# Patient Record
Sex: FEMALE | Race: WHITE | NOT HISPANIC OR LATINO | Employment: FULL TIME | URBAN - METROPOLITAN AREA
[De-identification: names, ages, dates, MRNs, and addresses within clinical notes are randomized per-mention and may not be internally consistent; named-entity substitution may affect disease eponyms.]

---

## 2017-09-25 NOTE — PROGRESS NOTES
"  SUBJECTIVE:   Deni Simental is a 36 year old female who presents to clinic today for the following health issues:        ENT Symptoms             Symptoms: cc Present Absent Comment   Fever/Chills  x  Chills/sweats   Fatigue  x     Muscle Aches  x     Eye Irritation   x    Sneezing   x    Nasal Eduardo/Drg  x     Sinus Pressure/Pain   x    Loss of smell   x    Dental pain   x    Sore Throat  x     Swollen Glands  x     Ear Pain/Fullness  x     Cough  x  Non-productive   Wheeze   x    Chest Pain   x    Shortness of breath   x    Rash   x    Other   x      Symptom duration:  2 weeks    Symptom severity:  mod    Treatments tried:  OTC    Contacts:  none     Having more headaches, due to increase in triggers.  Starts as frontal pain then spreads to neck and jaw, then gets photo and phonophobia with nausea and vomiting.  Tries OTC analgesics without help.  Gets weekly HA for past 8-9 months, has near daily tension ha.    Medications updated and reviewed.  Past, family and surgical history is updated and reviewed in the record.  ROS:  Other than noted above, general, HEENT, respiratory, cardiac and gastrointestinal systems are negative.  OBJECTIVE:  /67  Pulse 89  Ht 5' 5.5\" (1.664 m)  Wt 184 lb (83.5 kg)  SpO2 100%  BMI 30.15 kg/m2   GENERAL: Pleasant and interactive. No acute distress.  HEENT: Normocephalic, atraumatic. PEERRLA, EOMI.  Scleras, lids and conjunctivae normal. Pinnas, canals and TM's clear.  Nose with thick discharge. Oropharynx moist and clear. Sinus tenderness to palpation present.   NECK: supple and free of adenopathy or masses, the thyroid is normal without enlargement or nodules.  CHEST:  clear, no wheezing or rales. Normal symmetric air entry throughout both lung fields. No chest wall deformities or tenderness.  HEART:  S1 and S2 normal, no murmurs, clicks, gallops or rubs. Regular rate and rhythm.  SKIN:  Only benign skin findings. No unusual rashes or suspicious skin lesions noted. " Nails appear normal.  NEURO: CN 2-12 intact    Assessment:  (J01.90) Acute sinusitis with symptoms > 10 days  (primary encounter diagnosis)  Comment: prolonged symptoms  Plan: amoxicillin-clavulanate (AUGMENTIN) 875-125 MG         per tablet        Treat with augmentin, add probiotic/yogurt    (G43.109) Migraine with aura and without status migrainosus, not intractable  Comment: worsening  Plan: rizatriptan (MAXALT) 5 MG tablet        Combined daily tension with episodic migraine  See chiropractor for adjustment, pt making changes to reduce stress which is a trigger  Trial of maxalt for HA lysis  F/u 4-6 weeks.    See Patient Instructions

## 2017-09-26 ENCOUNTER — OFFICE VISIT (OUTPATIENT)
Dept: FAMILY MEDICINE | Facility: CLINIC | Age: 37
End: 2017-09-26
Payer: COMMERCIAL

## 2017-09-26 VITALS
DIASTOLIC BLOOD PRESSURE: 67 MMHG | HEART RATE: 89 BPM | HEIGHT: 66 IN | SYSTOLIC BLOOD PRESSURE: 111 MMHG | WEIGHT: 184 LBS | BODY MASS INDEX: 29.57 KG/M2 | OXYGEN SATURATION: 100 %

## 2017-09-26 DIAGNOSIS — J01.90 ACUTE SINUSITIS WITH SYMPTOMS > 10 DAYS: Primary | ICD-10-CM

## 2017-09-26 DIAGNOSIS — G43.109 MIGRAINE WITH AURA AND WITHOUT STATUS MIGRAINOSUS, NOT INTRACTABLE: ICD-10-CM

## 2017-09-26 PROCEDURE — 99214 OFFICE O/P EST MOD 30 MIN: CPT | Performed by: FAMILY MEDICINE

## 2017-09-26 RX ORDER — RIZATRIPTAN BENZOATE 5 MG/1
5-10 TABLET ORAL
Qty: 18 TABLET | Refills: 1 | Status: SHIPPED | OUTPATIENT
Start: 2017-09-26 | End: 2018-02-15

## 2017-09-26 RX ORDER — THYROID 15 MG/1
15 TABLET ORAL DAILY
COMMUNITY
End: 2018-08-20 | Stop reason: DRUGHIGH

## 2017-09-26 NOTE — MR AVS SNAPSHOT
After Visit Summary   9/26/2017    Deni Simental    MRN: [id number]           Patient Information     Date Of Birth          1980        Visit Information        Provider Department      9/26/2017 11:55 AM Radha Lay MD [facility]        Today's Diagnoses     Acute sinusitis with symptoms > 10 days    -  1    Migraine with aura and without status migrainosus, not intractable          Care Instructions        Follow up in 4-6 weeks for migraine headaches.      See a chiropractor for adjustment for headaches.          Follow-ups after your visit        Who to contact     If you have questions or need follow up information about today's clinic visit or your schedule please contact [facility] directly at [phone].  Normal or non-critical lab and imaging results will be communicated to you by Dalloul[employer]Whart, letter or phone within 4 business days after the clinic has received the results. If you do not hear from us within 7 days, please contact the clinic through Dalloul[employer]Whart or phone. If you have a critical or abnormal lab result, we will notify you by phone as soon as possible.  Submit refill requests through [employer] or call your pharmacy and they will forward the refill request to us. Please allow 3 business days for your refill to be completed.          Additional Information About Your Visit        MyChart Information     [employer] gives you secure access to your electronic health record. If you see a primary care provider, you can also send messages to your care team and make appointments. If you have questions, please call your primary care clinic.  If you do not have a primary care provider, please call [phone] and they will assist you.        Care EveryWhere ID     This is your Care EveryWhere ID. This could be used by other organizations to access your [facility] medical records  [id number]        Your Vitals Were     Pulse Height Pulse Oximetry BMI  "(Body Mass Index)          89 5' 5.5\" (1.664 m) 100% 30.15 kg/m2         Blood Pressure from Last 3 Encounters:   09/26/17 111/67   09/14/16 104/67   09/28/15 109/66    Weight from Last 3 Encounters:   09/26/17 184 lb (83.5 kg)   09/14/16 165 lb 9.6 oz (75.1 kg)   09/28/15 173 lb 6.4 oz (78.7 kg)              Today, you had the following     No orders found for display         Today's Medication Changes          These changes are accurate as of: 9/26/17 12:40 PM.  If you have any questions, ask your nurse or doctor.               Start taking these medicines.        Dose/Directions    amoxicillin-clavulanate 875-125 MG per tablet   Commonly known as:  AUGMENTIN   Used for:  Acute sinusitis with symptoms > 10 days   Started by:  Radha Lay MD        Dose:  1 tablet   Take 1 tablet by mouth 2 times daily   Quantity:  20 tablet   Refills:  0       rizatriptan 5 MG tablet   Commonly known as:  MAXALT   Used for:  Migraine with aura and without status migrainosus, not intractable   Started by:  Radha Lay MD        Dose:  5-10 mg   Take 1-2 tablets (5-10 mg) by mouth at onset of headache for migraine May repeat in 2 hours. Max 6 tablets/24 hours.   Quantity:  18 tablet   Refills:  1            Where to get your medicines      These medications were sent to [place] [address] [place], [place] - [address] [facility]  [address] [facility] [address]     Phone:  [phone]     amoxicillin-clavulanate 875-125 MG per tablet    rizatriptan 5 MG tablet                Primary Care Provider Office Phone # Fax #    Kristen M Kehr, PA-C [phone]       [address] [facility] [address]        Equal Access to Services     ELEAZAR REEVES AH: Roya lauren Soaram, waaxda luqadaha, qaybta kaalmada adeegyaolga, robert valadez [facility] [phone].    ATENCIÓN: Si habla español, tiene a singleton disposición servicios gratuitos de asistencia lingüística. " Soniya bowling [phone].    We comply with applicable federal civil rights laws and [place] laws. We do not discriminate on the basis of race, color, national origin, age, disability sex, sexual orientation or gender identity.            Thank you!     [facility] you for [facility]  [facility]. Our goal is always to provide you with excellent care. Hearing back from our patients is one way we can continue to improve our services. Please take a few minutes to complete the written survey that you may receive in the mail after your visit with us. Thank you!             Your Updated Medication List - Protect others around you: Learn how to safely use, store and throw away your medicines at www.disposemymeds.org.          This list is accurate as of: 9/26/17 12:40 PM.  Always use your most recent med list.                   Brand Name Dispense Instructions for use Diagnosis    amoxicillin-clavulanate 875-125 MG per tablet    AUGMENTIN    20 tablet    Take 1 tablet by mouth 2 times daily    Acute sinusitis with symptoms > 10 days       rizatriptan 5 MG tablet    MAXALT    18 tablet    Take 1-2 tablets (5-10 mg) by mouth at onset of headache for migraine May repeat in 2 hours. Max 6 tablets/24 hours.    Migraine with aura and without status migrainosus, not intractable       thyroid 15 MG Tabs tablet      Take 15 mg by mouth daily

## 2017-09-26 NOTE — PATIENT INSTRUCTIONS
Follow up in 4-6 weeks for migraine headaches.      See a chiropractor for adjustment for headaches.

## 2017-11-22 ENCOUNTER — OFFICE VISIT (OUTPATIENT)
Dept: FAMILY MEDICINE | Facility: CLINIC | Age: 37
End: 2017-11-22
Payer: COMMERCIAL

## 2017-11-22 VITALS
WEIGHT: 182 LBS | DIASTOLIC BLOOD PRESSURE: 67 MMHG | SYSTOLIC BLOOD PRESSURE: 106 MMHG | HEART RATE: 60 BPM | BODY MASS INDEX: 29.83 KG/M2 | OXYGEN SATURATION: 100 % | TEMPERATURE: 99.4 F

## 2017-11-22 DIAGNOSIS — E06.3 HASHIMOTO'S THYROIDITIS: ICD-10-CM

## 2017-11-22 DIAGNOSIS — G43.109 MIGRAINE WITH AURA AND WITHOUT STATUS MIGRAINOSUS, NOT INTRACTABLE: Primary | ICD-10-CM

## 2017-11-22 PROCEDURE — 99214 OFFICE O/P EST MOD 30 MIN: CPT | Performed by: FAMILY MEDICINE

## 2017-11-22 RX ORDER — THYROID 30 MG/1
30 TABLET ORAL DAILY
Qty: 90 TABLET | Refills: 1 | Status: SHIPPED | OUTPATIENT
Start: 2017-11-22 | End: 2018-05-23

## 2017-11-22 NOTE — NURSING NOTE
"Chief Complaint   Patient presents with     Migraine Mgmt       Initial /67 (Cuff Size: Adult Regular)  Pulse 60  Temp 99.4  F (37.4  C) (Oral)  Wt 182 lb (82.6 kg)  SpO2 100%  BMI 29.83 kg/m2 Estimated body mass index is 29.83 kg/(m^2) as calculated from the following:    Height as of 9/26/17: 5' 5.5\" (1.664 m).    Weight as of this encounter: 182 lb (82.6 kg).  Medication Reconciliation: complete    Latesha Pacheco LPN    "

## 2017-11-22 NOTE — MR AVS SNAPSHOT
After Visit Summary   11/22/2017    Deni Simental    MRN: [id number]           Patient Information     Date Of Birth          1980        Visit Information        Provider Department      11/22/2017 9:55 AM Radha Lay MD [facility]        Today's Diagnoses     Migraine with aura and without status migrainosus, not intractable    -  1    Hashimoto's thyroiditis           Follow-ups after your visit        Follow-up notes from your care team     Return in about 1 week (around 11/29/2017) for Lab Work for thyroid.      Future tests that were ordered for you today     Open Future Orders        Priority Expected Expires Ordered    TSH with free T4 reflex Routine 1/17/2018 1/21/2018 11/22/2017            Who to contact     If you have questions or need follow up information about today's clinic visit or your schedule please contact [facility] directly at [phone].  Normal or non-critical lab and imaging results will be communicated to you by MyChart, letter or phone within 4 business days after the clinic has received the results. If you do not hear from us within 7 days, please contact the clinic through [employer] or phone. If you have a critical or abnormal lab result, we will notify you by phone as soon as possible.  Submit refill requests through [employer] or call your pharmacy and they will forward the refill request to us. Please allow 3 business days for your refill to be completed.          Additional Information About Your Visit        MyChart Information     [employer] gives you secure access to your electronic health record. If you see a primary care provider, you can also send messages to your care team and make appointments. If you have questions, please call your primary care clinic.  If you do not have a primary care provider, please call [phone] and they will assist you.        Care EveryWhere ID     This is your Care EveryWhere ID. This  could be used by other organizations to access your [place] medical records  [id number]        Your Vitals Were     Pulse Temperature Pulse Oximetry BMI (Body Mass Index)          60 99.4  F (37.4  C) (Oral) 100% 29.83 kg/m2         Blood Pressure from Last 3 Encounters:   11/22/17 106/67   09/26/17 111/67   09/14/16 104/67    Weight from Last 3 Encounters:   11/22/17 182 lb (82.6 kg)   09/26/17 184 lb (83.5 kg)   09/14/16 165 lb 9.6 oz (75.1 kg)                 Today's Medication Changes          These changes are accurate as of: 11/22/17 10:41 AM.  If you have any questions, ask your nurse or doctor.               These medicines have changed or have updated prescriptions.        Dose/Directions    * thyroid 15 MG Tabs tablet   This may have changed:  Another medication with the same name was added. Make sure you understand how and when to take each.   Changed by:  Radha Lay MD        Dose:  15 mg   Take 15 mg by mouth daily   Refills:  0       * thyroid 30 MG tablet   Commonly known as:  ARMOUR THYROID   This may have changed:  You were already taking a medication with the same name, and this prescription was added. Make sure you understand how and when to take each.   Used for:  Hashimoto's thyroiditis   Changed by:  Radha Lay MD        Dose:  30 mg   Take 1 tablet (30 mg) by mouth daily   Quantity:  90 tablet   Refills:  1       * Notice:  This list has 2 medication(s) that are the same as other medications prescribed for you. Read the directions carefully, and ask your doctor or other care provider to review them with you.         Where to get your medicines      These medications were sent to [place] [address] [place], [place] - [address] [facility] [address] [facility] [address]     Phone:  [phone]     thyroid 30 MG tablet                Primary Care Provider Office Phone # Fax #    Kristen M Kehr, PA-C [phone] [address] [facility]  [facility] [address]        Equal Access to Services     [facility] LALA : Hadii gris hyde hadjamesvalentin Lauren, waxavierda luqadaha, qaybta kaalmaolga heart, robert senior. So [facility] [phone].    ATENCIÓN: Si habla español, tiene a singleton disposición servicios gratuitos de asistencia lingüística. [facility] [phone].    We comply with applicable federal civil rights laws and [place] laws. We do not discriminate on the basis of race, color, national origin, age, disability, sex, sexual orientation, or gender identity.            Thank you!     Thank you for choosing [facility]  for your care. Our goal is always to provide you with excellent care. Hearing back from our patients is one way we can continue to improve our services. Please take a few minutes to complete the written survey that you may receive in the mail after your visit with us. Thank you!             Your Updated Medication List - Protect others around you: Learn how to safely use, store and throw away your medicines at www.disposemymeds.org.          This list is accurate as of: 11/22/17 10:41 AM.  Always use your most recent med list.                   Brand Name Dispense Instructions for use Diagnosis    rizatriptan 5 MG tablet    MAXALT    18 tablet    Take 1-2 tablets (5-10 mg) by mouth at onset of headache for migraine May repeat in 2 hours. Max 6 tablets/24 hours.    Migraine with aura and without status migrainosus, not intractable       * thyroid 15 MG Tabs tablet      Take 15 mg by mouth daily        * thyroid 30 MG tablet    ARMOUR THYROID    90 tablet    Take 1 tablet (30 mg) by mouth daily    Hashimoto's thyroiditis       * Notice:  This list has 2 medication(s) that are the same as other medications prescribed for you. Read the directions carefully, and ask your doctor or other care provider to review them with you.

## 2018-01-22 ENCOUNTER — MYC MEDICAL ADVICE (OUTPATIENT)
Dept: FAMILY MEDICINE | Facility: CLINIC | Age: 38
End: 2018-01-22

## 2018-01-22 DIAGNOSIS — E06.3 HASHIMOTO'S THYROIDITIS: ICD-10-CM

## 2018-01-22 LAB — TSH SERPL DL<=0.005 MIU/L-ACNC: 0.73 MU/L (ref 0.4–4)

## 2018-01-22 PROCEDURE — 84443 ASSAY THYROID STIM HORMONE: CPT | Performed by: FAMILY MEDICINE

## 2018-01-22 PROCEDURE — 36415 COLL VENOUS BLD VENIPUNCTURE: CPT | Performed by: FAMILY MEDICINE

## 2018-01-23 NOTE — TELEPHONE ENCOUNTER
Per protocol, will route encounter to be cosigned by provider for Verbal Orders.  Lidya Moralez RN

## 2018-02-12 ENCOUNTER — E-VISIT (OUTPATIENT)
Dept: FAMILY MEDICINE | Facility: CLINIC | Age: 38
End: 2018-02-12
Payer: COMMERCIAL

## 2018-02-12 DIAGNOSIS — G43.109 MIGRAINE WITH AURA AND WITHOUT STATUS MIGRAINOSUS, NOT INTRACTABLE: Primary | ICD-10-CM

## 2018-02-12 PROCEDURE — 99444 ZZC PHYSICIAN ONLINE EVALUATION & MANAGEMENT SERVICE: CPT | Performed by: FAMILY MEDICINE

## 2018-02-15 DIAGNOSIS — G43.109 MIGRAINE WITH AURA AND WITHOUT STATUS MIGRAINOSUS, NOT INTRACTABLE: ICD-10-CM

## 2018-02-15 RX ORDER — RIZATRIPTAN BENZOATE 5 MG/1
TABLET ORAL
Qty: 18 TABLET | Refills: 1 | Status: SHIPPED | OUTPATIENT
Start: 2018-02-15 | End: 2018-05-23

## 2018-05-23 DIAGNOSIS — E06.3 HASHIMOTO'S THYROIDITIS: ICD-10-CM

## 2018-05-23 DIAGNOSIS — G43.109 MIGRAINE WITH AURA AND WITHOUT STATUS MIGRAINOSUS, NOT INTRACTABLE: ICD-10-CM

## 2018-05-23 RX ORDER — LEVOTHYROXINE, LIOTHYRONINE 19; 4.5 UG/1; UG/1
TABLET ORAL
Qty: 90 TABLET | Refills: 1 | Status: SHIPPED | OUTPATIENT
Start: 2018-05-23 | End: 2018-11-19

## 2018-05-23 RX ORDER — RIZATRIPTAN BENZOATE 5 MG/1
TABLET ORAL
Qty: 18 TABLET | Refills: 3 | Status: SHIPPED | OUTPATIENT
Start: 2018-05-23 | End: 2019-05-06

## 2018-07-23 ENCOUNTER — RADIANT APPOINTMENT (OUTPATIENT)
Dept: ULTRASOUND IMAGING | Facility: CLINIC | Age: 38
End: 2018-07-23
Attending: FAMILY MEDICINE
Payer: COMMERCIAL

## 2018-07-23 DIAGNOSIS — E06.3 HASHIMOTO'S THYROIDITIS: ICD-10-CM

## 2018-07-23 PROCEDURE — 76536 US EXAM OF HEAD AND NECK: CPT

## 2018-07-25 ENCOUNTER — TELEPHONE (OUTPATIENT)
Dept: FAMILY MEDICINE | Facility: CLINIC | Age: 38
End: 2018-07-25

## 2018-08-13 NOTE — PROGRESS NOTES
SUBJECTIVE:   Deni Simental is a 37 year old female who presents to clinic today for the following health issues:    Hypothyroidism Follow-up      Since last visit, patient describes the following symptoms: constipation and fatigue, Fullness in throat, foggy mind       Amount of exercise or physical activity: some, not much     Problems taking medications regularly: No    Medication side effects: none    Diet: gluten-free/reduced, dairy free, no soy    Increased symptoms, would like to be checked.currently not taking armor thyroid    Feels her thyroid has increased in size, recent US shows new very small lesion on isthmus     Problem list and histories reviewed & adjusted, as indicated.  Additional history: as documented    Patient Active Problem List   Diagnosis     CARDIOVASCULAR SCREENING; LDL GOAL LESS THAN 160     Migraine with aura and without status migrainosus, not intractable     Hashimoto's thyroiditis     Past Surgical History:   Procedure Laterality Date     HC TOOTH EXTRACTION W/FORCEP         Social History   Substance Use Topics     Smoking status: Former Smoker     Types: Cigarettes     Quit date: 2011     Smokeless tobacco: Never Used      Comment: Lives in smoke free household     Alcohol use No     Family History   Problem Relation Age of Onset     Alcohol/Drug Mother       MVA alcohol related     Diabetes Father      hypoglycemia     Hypertension Father      Cancer Paternal Grandfather      lung cancer     Thyroid Disease Paternal Grandmother      Thyroid Disease Sister      Graves, tumor     C.A.D. No family hx of      Hypertension No family hx of      Depression No family hx of      GASTROINTESTINAL DISEASE No family hx of      HEART DISEASE No family hx of      Lipids No family hx of      Neurologic Disorder No family hx of      Thyroid Disease No family hx of      Respiratory No family hx of      Cerebrovascular Disease No family hx of      Glaucoma No family hx of       Macular Degeneration No family hx of          Current Outpatient Prescriptions   Medication Sig Dispense Refill     NP THYROID 30 MG tablet TAKE 1 TABLET (30 MG) BY MOUTH DAILY 90 tablet 1     rizatriptan (MAXALT) 5 MG tablet TAKE 1-2 TABLETS BY MOUTH AT ONSET OF HEADACHE FOR MIGRAINE. MAY REPEAT IN 2HRS. MAX 6 TAB/24 HRS 18 tablet 3     BP Readings from Last 3 Encounters:   08/20/18 118/71   11/22/17 106/67   09/26/17 111/67    Wt Readings from Last 3 Encounters:   08/20/18 170 lb (77.1 kg)   11/22/17 182 lb (82.6 kg)   09/26/17 184 lb (83.5 kg)                  Labs reviewed in EPIC    Reviewed and updated as needed this visit by clinical staff  Tobacco  Allergies  Meds  Problems  Med Hx  Surg Hx  Fam Hx  Soc Hx        Reviewed and updated as needed this visit by Provider  Allergies  Meds  Problems         ROS:  Constitutional, HEENT, cardiovascular, pulmonary, gi and gu systems are negative, except as otherwise noted.    OBJECTIVE:     /71  Pulse 71  Temp 97.8  F (36.6  C) (Oral)  Resp 14  Wt 170 lb (77.1 kg)  LMP 08/16/2018  BMI 27.86 kg/m2  Body mass index is 27.86 kg/(m^2).  GENERAL: healthy, alert and no distress  EYES: Eyes grossly normal to inspection, PERRL and conjunctivae and sclerae normal  NECK: no adenopathy, no asymmetry, masses, or scars and thyromegaly approximately 1.5 times normal  MS: no gross musculoskeletal defects noted, no edema  SKIN: no suspicious lesions or rashes  PSYCH: mentation appears normal, affect normal/bright    Diagnostic Test Results:  none     ASSESSMENT/PLAN:     (E06.3) Hashimoto's thyroiditis  (primary encounter diagnosis)  Comment: stable  Plan: TSH with free T4 reflex, T3, Free, US Thyroid        Check labs today, restart med if needed  Recheck US in 6 months due to new lesion,      Patient Instructions       Recheck ultrasound in 6 months      Book physical in 6 weeks.            Radha Lay MD  [facility]

## 2018-08-20 ENCOUNTER — OFFICE VISIT (OUTPATIENT)
Dept: FAMILY MEDICINE | Facility: CLINIC | Age: 38
End: 2018-08-20
Payer: COMMERCIAL

## 2018-08-20 VITALS
TEMPERATURE: 97.8 F | DIASTOLIC BLOOD PRESSURE: 71 MMHG | RESPIRATION RATE: 14 BRPM | SYSTOLIC BLOOD PRESSURE: 118 MMHG | WEIGHT: 170 LBS | HEART RATE: 71 BPM | BODY MASS INDEX: 27.86 KG/M2

## 2018-08-20 DIAGNOSIS — E06.3 HASHIMOTO'S THYROIDITIS: Primary | ICD-10-CM

## 2018-08-20 LAB
T3FREE SERPL-MCNC: 3 PG/ML (ref 2.3–4.2)
TSH SERPL DL<=0.005 MIU/L-ACNC: 1.1 MU/L (ref 0.4–4)

## 2018-08-20 PROCEDURE — 84443 ASSAY THYROID STIM HORMONE: CPT | Performed by: FAMILY MEDICINE

## 2018-08-20 PROCEDURE — 99213 OFFICE O/P EST LOW 20 MIN: CPT | Performed by: FAMILY MEDICINE

## 2018-08-20 PROCEDURE — 84481 FREE ASSAY (FT-3): CPT | Performed by: FAMILY MEDICINE

## 2018-08-20 PROCEDURE — 36415 COLL VENOUS BLD VENIPUNCTURE: CPT | Performed by: FAMILY MEDICINE

## 2018-08-20 NOTE — NURSING NOTE
"Chief Complaint   Patient presents with     Thyroid Disease       Initial /71  Pulse 71  Temp 97.8  F (36.6  C) (Oral)  Resp 14  Wt 170 lb (77.1 kg)  LMP 08/16/2018  BMI 27.86 kg/m2 Estimated body mass index is 27.86 kg/(m^2) as calculated from the following:    Height as of 9/26/17: 5' 5.5\" (1.664 m).    Weight as of this encounter: 170 lb (77.1 kg).  Medication Reconciliation: complete  Ike Forman CMA    "

## 2018-08-20 NOTE — MR AVS SNAPSHOT
After Visit Summary   8/20/2018    Deni Simental    MRN: [id number]           Patient Information     Date Of Birth          1980        Visit Information        Provider Department      8/20/2018 9:20 AM Radha Lay MD [facility]        Today's Diagnoses     Hashimoto's thyroiditis    -  1      Care Instructions        Recheck ultrasound in 6 months      Book physical in 6 weeks.                Follow-ups after your visit        Follow-up notes from your care team     Return in about 6 weeks (around 10/1/2018) for Physical Exam.      Future tests that were ordered for you today     Open Future Orders        Priority Expected Expires Ordered    US Thyroid Routine 2/20/2019 8/20/2019 8/20/2018            Who to contact     If you have questions or need follow up information about today's clinic visit or your schedule please contact [facility] directly at [phone].  Normal or non-critical lab and imaging results will be communicated to you by MyChart, letter or phone within 4 business days after the clinic has received the results. If you do not hear from us within 7 days, please contact the clinic through [employer] or phone. If you have a critical or abnormal lab result, we will notify you by phone as soon as possible.  Submit refill requests through [employer] or call your pharmacy and they will forward the refill request to us. Please allow 3 business days for your refill to be completed.          Additional Information About Your Visit        MyChart Information     [employer] gives you secure access to your electronic health record. If you see a primary care provider, you can also send messages to your care team and make appointments. If you have questions, please call your primary care clinic.  If you do not have a primary care provider, please call [phone] and they will assist you.        Care EveryWhere ID     This is your Care EveryWhere ID.  This could be used by other organizations to access your [facility] medical records  [id number]        Your Vitals Were     Pulse Temperature Respirations Last Period BMI (Body Mass Index)       71 97.8  F (36.6  C) (Oral) 14 08/16/2018 27.86 kg/m2        Blood Pressure from Last 3 Encounters:   08/20/18 118/71   11/22/17 106/67   09/26/17 111/67    Weight from Last 3 Encounters:   08/20/18 170 lb (77.1 kg)   11/22/17 182 lb (82.6 kg)   09/26/17 184 lb (83.5 kg)              We Performed the Following     T3, Free     TSH with free T4 reflex          Today's Medication Changes          These changes are accurate as of 8/20/18  9:25 AM.  If you have any questions, ask your nurse or doctor.               These medicines have changed or have updated prescriptions.        Dose/Directions    NP THYROID 30 MG tablet   This may have changed:  Another medication with the same name was removed. Continue taking this medication, and follow the directions you see here.   Used for:  Hashimoto's thyroiditis   Generic drug:  thyroid   Changed by:  Radha Lay MD        TAKE 1 TABLET (30 MG) BY MOUTH DAILY   Quantity:  90 tablet   Refills:  1                Primary Care Provider Office Phone # Fax #    Radha Lay [phone] [address] [facility] [address]        Equal Access to Services     [facility] LALA AH: Hadii gris hyde hadasho Soomaali, waaxda luqadaha, qaybta kaalmada jhonathanyada, robert senior. So [facility] [phone].    ATENCIÓN: Si habla español, tiene a singleton disposición servicios gratuitos de asistencia lingüística. Soniya al [phone].    We comply with applicable federal civil rights laws and [place] laws. We do not discriminate on the basis of race, color, national origin, age, disability, sex, sexual orientation, or gender identity.            Thank you!     Thank you for choosing [facility] [place]  for your care. Our goal is always to  provide you with excellent care. Hearing back from our patients is one way we can continue to improve our services. Please take a few minutes to complete the written survey that you may receive in the mail after your visit with us. Thank you!             Your Updated Medication List - Protect others around you: Learn how to safely use, store and throw away your medicines at www.disposemymeds.org.          This list is accurate as of 8/20/18  9:25 AM.  Always use your most recent med list.                   Brand Name Dispense Instructions for use Diagnosis    NP THYROID 30 MG tablet   Generic drug:  thyroid     90 tablet    TAKE 1 TABLET (30 MG) BY MOUTH DAILY    Hashimoto's thyroiditis       rizatriptan 5 MG tablet    MAXALT    18 tablet    TAKE 1-2 TABLETS BY MOUTH AT ONSET OF HEADACHE FOR MIGRAINE. MAY REPEAT IN 2HRS. MAX 6 TAB/24 HRS    Migraine with aura and without status migrainosus, not intractable

## 2018-09-23 DIAGNOSIS — E06.3 HASHIMOTO'S THYROIDITIS: ICD-10-CM

## 2018-09-24 RX ORDER — LEVOTHYROXINE, LIOTHYRONINE 19; 4.5 UG/1; UG/1
TABLET ORAL
Qty: 90 TABLET | Refills: 1 | OUTPATIENT
Start: 2018-09-24
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
## 2018-10-01 ENCOUNTER — OFFICE VISIT (OUTPATIENT)
Dept: FAMILY MEDICINE | Facility: CLINIC | Age: 38
End: 2018-10-01
Payer: COMMERCIAL

## 2018-10-01 VITALS
DIASTOLIC BLOOD PRESSURE: 70 MMHG | RESPIRATION RATE: 16 BRPM | WEIGHT: 175.2 LBS | HEART RATE: 76 BPM | BODY MASS INDEX: 29.91 KG/M2 | TEMPERATURE: 98.2 F | HEIGHT: 64 IN | SYSTOLIC BLOOD PRESSURE: 116 MMHG

## 2018-10-01 DIAGNOSIS — R20.2 PARESTHESIA OF FOOT, BILATERAL: ICD-10-CM

## 2018-10-01 DIAGNOSIS — Z00.00 ROUTINE GENERAL MEDICAL EXAMINATION AT A HEALTH CARE FACILITY: Primary | ICD-10-CM

## 2018-10-01 LAB
CHOLEST SERPL-MCNC: 154 MG/DL
GLUCOSE SERPL-MCNC: 80 MG/DL (ref 70–99)
HDLC SERPL-MCNC: 46 MG/DL
LDLC SERPL CALC-MCNC: 94 MG/DL
NONHDLC SERPL-MCNC: 108 MG/DL
TRIGL SERPL-MCNC: 71 MG/DL
VIT B12 SERPL-MCNC: 580 PG/ML (ref 193–986)

## 2018-10-01 PROCEDURE — 36415 COLL VENOUS BLD VENIPUNCTURE: CPT | Performed by: FAMILY MEDICINE

## 2018-10-01 PROCEDURE — 99395 PREV VISIT EST AGE 18-39: CPT | Performed by: FAMILY MEDICINE

## 2018-10-01 PROCEDURE — 82947 ASSAY GLUCOSE BLOOD QUANT: CPT | Performed by: FAMILY MEDICINE

## 2018-10-01 PROCEDURE — G0145 SCR C/V CYTO,THINLAYER,RESCR: HCPCS | Performed by: FAMILY MEDICINE

## 2018-10-01 PROCEDURE — 82607 VITAMIN B-12: CPT | Performed by: FAMILY MEDICINE

## 2018-10-01 PROCEDURE — 87624 HPV HI-RISK TYP POOLED RSLT: CPT | Performed by: FAMILY MEDICINE

## 2018-10-01 PROCEDURE — 80061 LIPID PANEL: CPT | Performed by: FAMILY MEDICINE

## 2018-10-01 ASSESSMENT — ENCOUNTER SYMPTOMS
ABDOMINAL PAIN: 0
CHILLS: 0
NERVOUS/ANXIOUS: 0
DIZZINESS: 0
ARTHRALGIAS: 1
BREAST MASS: 0
HEADACHES: 1
JOINT SWELLING: 0
PARESTHESIAS: 0
DYSURIA: 0
NAUSEA: 0
FREQUENCY: 1
HEARTBURN: 1
DIARRHEA: 1
MYALGIAS: 1
EYE PAIN: 0
SHORTNESS OF BREATH: 0
WEAKNESS: 0
COUGH: 0
PALPITATIONS: 0
HEMATOCHEZIA: 0
FEVER: 0
CONSTIPATION: 1
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
## 2018-10-01 NOTE — MR AVS SNAPSHOT
After Visit Summary   10/1/2018    Deni Simental    MRN: [id number]           Patient Information     Date Of Birth          1980        Visit Information        Provider Department      10/1/2018 9:00 AM Radha Lay MD [facility]        Today's Diagnoses     Routine general medical examination at a health care facility    -  1    Paresthesia of foot, bilateral          Care Instructions      Preventive Health Recommendations  Female Ages 26 - 39  Yearly exam:   See your health care provider every year in order to    Review health changes.     Discuss preventive care.      Review your medicines if you your doctor has prescribed any.    Until age 30: Get a Pap test every three years (more often if you have had an abnormal result).    After age 30: Talk to your doctor about whether you should have a Pap test every 3 years or have a Pap test with HPV screening every 5 years.   You do not need a Pap test if your uterus was removed (hysterectomy) and you have not had cancer.  You should be tested each year for STDs (sexually transmitted diseases), if you're at risk.   Talk to your provider about how often to have your cholesterol checked.  If you are at risk for diabetes, you should have a diabetes test (fasting glucose).  Shots: Get a flu shot each year. Get a tetanus shot every 10 years.   Nutrition:     Eat at least 5 servings of fruits and vegetables each day.    Eat whole-grain bread, whole-wheat pasta and brown rice instead of white grains and rice.    Get adequate Calcium and Vitamin D.     Lifestyle    Exercise at least 150 minutes a week (30 minutes a day, 5 days of the week). This will help you control your weight and prevent disease.    Limit alcohol to one drink per day.    No smoking.     Wear sunscreen to prevent skin cancer.    See your dentist every six months for an exam and cleaning.            Follow-ups after your visit        Follow-up notes from your  "care team     Return in about 1 year (around 10/1/2019) for Physical Exam.      Who to contact     If you have questions or need follow up information about today's clinic visit or your schedule please contact [facility] directly at [phone].  Normal or non-critical lab and imaging results will be communicated to you by MyChart, letter or phone within 4 business days after the clinic has received the results. If you do not hear from us within 7 days, please contact the clinic through [employer] or phone. If you have a critical or abnormal lab result, we will notify you by phone as soon as possible.  Submit refill requests through [employer] or call your pharmacy and they will forward the refill request to us. Please allow 3 business days for your refill to be completed.          Additional Information About Your Visit        [employer] Information     [employer] gives you secure access to your electronic health record. If you see a primary care provider, you can also send messages to your care team and make appointments. If you have questions, please call your primary care clinic.  If you do not have a primary care provider, please call [phone] and they will assist you.        Care EveryWhere ID     This is your Care EveryWhere ID. This could be used by other organizations to access your [facility] medical records  [id number]        Your Vitals Were     Pulse Temperature Respirations Height Last Period BMI (Body Mass Index)    76 98.2  F (36.8  C) (Oral) 16 5' 4\" (1.626 m) 09/14/2018 30.07 kg/m2       Blood Pressure from Last 3 Encounters:   10/01/18 116/70   08/20/18 118/71   11/22/17 106/67    Weight from Last 3 Encounters:   10/01/18 175 lb 3.2 oz (79.5 kg)   08/20/18 170 lb (77.1 kg)   11/22/17 182 lb (82.6 kg)              We Performed the Following     Glucose     HPV High Risk Types DNA Cervical     Lipid panel reflex to direct LDL Fasting     Pap imaged thin layer screen with HPV - recommended age " 30 - 65 years (select HPV order below)     Vitamin B12        Primary Care Provider Office Phone # Fax #    Radha Priya Lay [phone] [address] [facility] [address]        Equal Access to Services     ELEAZAR REEVES : Hadii gris ku hadjameso Soomaali, waaxda luqadaha, qaybta kaalmada adeegyada, robert mosqueran adestewart adam tiffanie senior. So [facility] [phone].    ATENCIÓN: Si habla español, tiene a singleton disposición servicios gratuitos de asistencia lingüística. Llame al [phone].    We comply with applicable federal civil rights laws and [place] laws. We do not discriminate on the basis of race, color, national origin, age, disability, sex, sexual orientation, or gender identity.            Thank you!     Thank you for choosing [facility] [place]  for your care. Our goal is always to provide you with excellent care. Hearing back from our patients is one way we can continue to improve our services. Please take a few minutes to complete the written survey that you may receive in the mail after your visit with us. Thank you!             Your Updated Medication List - Protect others around you: Learn how to safely use, store and throw away your medicines at www.disposemymeds.org.          This list is accurate as of 10/1/18  9:53 AM.  Always use your most recent med list.                   Brand Name Dispense Instructions for use Diagnosis    NP THYROID 30 MG tablet   Generic drug:  thyroid     90 tablet    TAKE 1 TABLET (30 MG) BY MOUTH DAILY    Hashimoto's thyroiditis       rizatriptan 5 MG tablet    MAXALT    18 tablet    TAKE 1-2 TABLETS BY MOUTH AT ONSET OF HEADACHE FOR MIGRAINE. MAY REPEAT IN 2HRS. MAX 6 TAB/24 HRS    Migraine with aura and without status migrainosus, not intractable

## 2018-10-01 NOTE — NURSING NOTE
"Chief Complaint   Patient presents with     Physical       Initial /70  Pulse 76  Temp 98.2  F (36.8  C) (Oral)  Resp 16  Ht 5' 4\" (1.626 m)  Wt 175 lb 3.2 oz (79.5 kg)  LMP 09/14/2018  BMI 30.07 kg/m2 Estimated body mass index is 30.07 kg/(m^2) as calculated from the following:    Height as of this encounter: 5' 4\" (1.626 m).    Weight as of this encounter: 175 lb 3.2 oz (79.5 kg).  Medication Reconciliation: complete  Ike Forman CMA    "

## 2018-10-03 LAB
COPATH REPORT: NORMAL
PAP: NORMAL

## 2018-10-05 LAB
FINAL DIAGNOSIS: NORMAL
HPV HR 12 DNA CVX QL NAA+PROBE: NEGATIVE
HPV16 DNA SPEC QL NAA+PROBE: NEGATIVE
HPV18 DNA SPEC QL NAA+PROBE: NEGATIVE
SPECIMEN DESCRIPTION: NORMAL
SPECIMEN SOURCE CVX/VAG CYTO: NORMAL

## 2018-11-19 DIAGNOSIS — E06.3 HASHIMOTO'S THYROIDITIS: ICD-10-CM

## 2018-11-21 RX ORDER — LEVOTHYROXINE, LIOTHYRONINE 19; 4.5 UG/1; UG/1
TABLET ORAL
Qty: 90 TABLET | Refills: 2 | Status: SHIPPED | OUTPATIENT
Start: 2018-11-21 | End: 2019-02-04

## 2019-01-31 NOTE — PROGRESS NOTES
SUBJECTIVE:   Deni Simental is a 38 year old female who presents to clinic today for the following health issues:    HPI  Insomnia  Onset: 3 months    Description:   Time to fall asleep (sleep latency): 2 hours  Middle of night awakening:  YES  Early morning awakening:  YES    Progression of Symptoms:  worsening    Accompanying Signs & Symptoms:  Daytime sleepiness/napping: YES  Excessive snoring/apnea: YES  Restless legs: no  Frequent urination: YES  Chronic pain:  YES    History:  Prior Insomnia: YES    Precipitating factors:   New stressful situation: no  Caffeine intake: no  OTC decongestants: YES  Any new medications: no    Alleviating factors:  Self medicating (alcohol, etc.):  no    Therapies Tried and outcome: Melatonin, Sleepy Tea. Nothing is helping.    She first started having difficulty with sleep a couple years ago, and in November 2017, her thyroid medication was increased which helped her sleep better. In the past 3 months it got slightly worse, then 1.5 months ago it got significantly worse again. She does not watch TV or do anything in the evening, she avoids caffeine besides early in the morning. She has tried yoga and meditation which has not helped. She occasionally has racing thoughts, but much of the time she does not have any thoughts or discomforts. She denies any stress or anxiety. She typically gets about 2-4 hours of sleep before waking up again, then takes awhile to fall back asleep again. She does not feel rested when she wakes up in the morning, and often with an exhaustion headache. She denies any cycling of her symptoms which could be related to menstrual cycles.     Problem list and histories reviewed & adjusted, as indicated.  Additional history: as documented    Patient Active Problem List   Diagnosis     CARDIOVASCULAR SCREENING; LDL GOAL LESS THAN 160     Migraine with aura and without status migrainosus, not intractable     Hashimoto's thyroiditis     Past Surgical  "History:   Procedure Laterality Date     HC TOOTH EXTRACTION W/FORCEP         Social History     Tobacco Use     Smoking status: Former Smoker     Types: Cigarettes     Last attempt to quit: 2011     Years since quittin.3     Smokeless tobacco: Never Used     Tobacco comment: Lives in smoke free household   Substance Use Topics     Alcohol use: No     Alcohol/week: 0.0 oz     Family History   Problem Relation Age of Onset     Alcohol/Drug Mother          MVA alcohol related     Diabetes Father         hypoglycemia     Hypertension Father      Cancer Paternal Grandfather         lung cancer     Thyroid Disease Paternal Grandmother      Thyroid Disease Sister         Graves, tumor     C.A.D. No family hx of      Hypertension No family hx of      Depression No family hx of      Gastrointestinal Disease No family hx of      Heart Disease No family hx of      Lipids No family hx of      Neurologic Disorder No family hx of      Thyroid Disease No family hx of      Respiratory No family hx of      Cerebrovascular Disease No family hx of      Glaucoma No family hx of      Macular Degeneration No family hx of            ROS:  Constitutional, HEENT, cardiovascular, pulmonary, GI, , musculoskeletal, neuro, skin, endocrine and psych systems are negative, except as in HPI or otherwise noted.     This document serves as a record of the services and decisions personally performed and made by Lavern Harley MD. It was created on her behalf by Figueroa Martinez, a trained medical scribe. The creation of this document is based the provider's statements to the medical scribe.  Figueroa Martinez, 2019 10:38 AM    OBJECTIVE:                                                    /62 (BP Location: Right arm, Patient Position: Chair, Cuff Size: Adult Regular)   Pulse 80   Temp 98.5  F (36.9  C) (Temporal)   Resp 16   Ht 1.676 m (5' 6\")   Wt 77.9 kg (171 lb 12.8 oz)   SpO2 100%   Breastfeeding? No   BMI 27.73 " kg/m    Body mass index is 27.73 kg/m .   GENERAL: healthy, alert, well nourished, well hydrated, no distress, overweight  NECK: no tenderness, no asymmetry, no stiffness; thyroid- right nodule palpable  RESP: lungs clear to auscultation - no rales, no rhonchi, no wheezes  CV: regular rates and rhythm, normal S1 S2, no S3 or S4 and no murmur, no click or rub -  SKIN: no suspicious lesions, no rashes to visible skin  PSYCH: Alert and oriented times 3; speech- coherent , normal rate and volume; able to articulate logical thoughts, able to abstract reason, no tangential thoughts, no hallucinations or delusions, affect- normal    Diagnostic test results:  No results found for this or any previous visit (from the past 24 hour(s)).     ASSESSMENT/PLAN:                                                        ICD-10-CM    1. Primary insomnia F51.01    2. Chronic fatigue R53.82    3. Hashimoto's thyroiditis E06.3    4. Vitamin D deficiency E55.9      Insomnia:  Recommended a journal to help clear her mind before sleep. Offered referral to sleep medicine, which she declines at this time. Order placed for labs that the patient will complete today.   Looking for generic fatigue work up as she does not have other symptoms directing me other than fatigue and h/o thyroiditis but feels something more is going on. Admits to having lots of thoughts at bedtime despite her meditation she has trouble clearing her mind at bed. She does not want to consider mental referral or meds as she feels she is not anxious.    Hashimoto's Thyroiditis:  Offered referral to Endocrinology, which she declines at this time. Had poor experience prior with them. Orders placed for labs to be completed today. May consider additional T3 replacement pending lab results. Wants to continue chronic care with Dr. Lay, so would like me to keep her in the loop.     Length of visit was 27 minutes with more than 50 percent of that time used for discussing medical  concerns and education.     The information in this document, created by the medical scribe for me, accurately reflects the services I personally performed and the decisions made by me. I have reviewed and approved this document for accuracy.   MD Lavern Hatfield MD, MD  [facility]

## 2019-02-04 ENCOUNTER — MYC MEDICAL ADVICE (OUTPATIENT)
Dept: FAMILY MEDICINE | Facility: OTHER | Age: 39
End: 2019-02-04

## 2019-02-04 ENCOUNTER — TELEPHONE (OUTPATIENT)
Dept: FAMILY MEDICINE | Facility: OTHER | Age: 39
End: 2019-02-04

## 2019-02-04 ENCOUNTER — OFFICE VISIT (OUTPATIENT)
Dept: FAMILY MEDICINE | Facility: OTHER | Age: 39
End: 2019-02-04
Payer: COMMERCIAL

## 2019-02-04 VITALS
DIASTOLIC BLOOD PRESSURE: 62 MMHG | RESPIRATION RATE: 16 BRPM | TEMPERATURE: 98.5 F | HEIGHT: 66 IN | WEIGHT: 171.8 LBS | HEART RATE: 80 BPM | OXYGEN SATURATION: 100 % | BODY MASS INDEX: 27.61 KG/M2 | SYSTOLIC BLOOD PRESSURE: 108 MMHG

## 2019-02-04 DIAGNOSIS — E06.3 HASHIMOTO'S THYROIDITIS: ICD-10-CM

## 2019-02-04 DIAGNOSIS — F51.01 PRIMARY INSOMNIA: Primary | ICD-10-CM

## 2019-02-04 DIAGNOSIS — E55.9 VITAMIN D DEFICIENCY: ICD-10-CM

## 2019-02-04 DIAGNOSIS — R53.82 CHRONIC FATIGUE: ICD-10-CM

## 2019-02-04 LAB
ALBUMIN SERPL-MCNC: 4.2 G/DL (ref 3.4–5)
ALP SERPL-CCNC: 67 U/L (ref 40–150)
ALT SERPL W P-5'-P-CCNC: 20 U/L (ref 0–50)
ANION GAP SERPL CALCULATED.3IONS-SCNC: 6 MMOL/L (ref 3–14)
AST SERPL W P-5'-P-CCNC: 10 U/L (ref 0–45)
BILIRUB SERPL-MCNC: 0.4 MG/DL (ref 0.2–1.3)
BUN SERPL-MCNC: 5 MG/DL (ref 7–30)
CALCIUM SERPL-MCNC: 8.5 MG/DL (ref 8.5–10.1)
CHLORIDE SERPL-SCNC: 107 MMOL/L (ref 94–109)
CO2 SERPL-SCNC: 28 MMOL/L (ref 20–32)
CREAT SERPL-MCNC: 0.72 MG/DL (ref 0.52–1.04)
CRP SERPL-MCNC: <2.9 MG/L (ref 0–8)
DEPRECATED CALCIDIOL+CALCIFEROL SERPL-MC: 26 UG/L (ref 20–75)
ERYTHROCYTE [SEDIMENTATION RATE] IN BLOOD BY WESTERGREN METHOD: 9 MM/H (ref 0–20)
FERRITIN SERPL-MCNC: 30 NG/ML (ref 12–150)
GFR SERPL CREATININE-BSD FRML MDRD: >90 ML/MIN/{1.73_M2}
GLUCOSE SERPL-MCNC: 78 MG/DL (ref 70–99)
POTASSIUM SERPL-SCNC: 4.1 MMOL/L (ref 3.4–5.3)
PROT SERPL-MCNC: 7.7 G/DL (ref 6.8–8.8)
SODIUM SERPL-SCNC: 141 MMOL/L (ref 133–144)
T3FREE SERPL-MCNC: 2.9 PG/ML (ref 2.3–4.2)
TSH SERPL DL<=0.005 MIU/L-ACNC: 3.7 MU/L (ref 0.4–4)

## 2019-02-04 PROCEDURE — 99214 OFFICE O/P EST MOD 30 MIN: CPT | Performed by: FAMILY MEDICINE

## 2019-02-04 PROCEDURE — 85652 RBC SED RATE AUTOMATED: CPT | Performed by: FAMILY MEDICINE

## 2019-02-04 PROCEDURE — 82306 VITAMIN D 25 HYDROXY: CPT | Performed by: FAMILY MEDICINE

## 2019-02-04 PROCEDURE — 84443 ASSAY THYROID STIM HORMONE: CPT | Performed by: FAMILY MEDICINE

## 2019-02-04 PROCEDURE — 36415 COLL VENOUS BLD VENIPUNCTURE: CPT | Performed by: FAMILY MEDICINE

## 2019-02-04 PROCEDURE — 80053 COMPREHEN METABOLIC PANEL: CPT | Performed by: FAMILY MEDICINE

## 2019-02-04 PROCEDURE — 86140 C-REACTIVE PROTEIN: CPT | Performed by: FAMILY MEDICINE

## 2019-02-04 PROCEDURE — 82728 ASSAY OF FERRITIN: CPT | Performed by: FAMILY MEDICINE

## 2019-02-04 PROCEDURE — 84481 FREE ASSAY (FT-3): CPT | Performed by: FAMILY MEDICINE

## 2019-02-04 ASSESSMENT — MIFFLIN-ST. JEOR: SCORE: 1486.03

## 2019-02-04 NOTE — TELEPHONE ENCOUNTER
[facility], your results show thyroid is decreasing and most with Hashimoto's do like to be a little on the other end. Can consider increase of your thyroid hormone. OR change to synthroid which can be better for regularity. OR we can consider adding cytomel (t3). Given value changes, I would encourage one of the first options prior to the last by my preference and experience to help. Can also send to Dr. Lay for opinion if you prefer.  Please let me know if you have any questions.    Lavern Harley MD      Please check on patient's choice later tomorrow if she has not responded.  Lavern Harley MD

## 2019-02-04 NOTE — RESULT ENCOUNTER NOTE
[facility], your results show thyroid is decreasing and most with Hashimoto's do like to be a little on the other end. Can consider increase of your thyroid hormone. OR change to synthroid which can be better for regularity. OR we can consider adding cytomel (t3). Given value changes, I would encourage one of the first options prior to the last by my preference and experience to help. Can also send to Dr. Lay for opinion if you prefer.  Please let me know if you have any questions.    Lavern Harley MD

## 2019-02-05 NOTE — TELEPHONE ENCOUNTER
"In response to your message in other encounter: \"[facility], your results show thyroid is decreasing and most with Hashimoto's do like to be a little on the other end. Can consider increase of your thyroid hormone. OR change to synthroid which can be better for regularity. OR we can consider adding cytomel (t3). Given value changes, I would encourage one of the first options prior to the last by my preference and experience to help. Can also send to Dr. Lay for opinion if you prefer.  Please let me know if you have any questions.    Lavern Harley MD        Please check on patient's choice later tomorrow if she has not responded.  Lavern Harley MD \"    Please review/advise.  "

## 2019-02-06 NOTE — RESULT ENCOUNTER NOTE
[facility], your vit D results are now in and normal.  Please let me know if you have any questions.    Lavern Harley MD

## 2019-02-07 ENCOUNTER — MYC MEDICAL ADVICE (OUTPATIENT)
Dept: FAMILY MEDICINE | Facility: OTHER | Age: 39
End: 2019-02-07

## 2019-02-07 DIAGNOSIS — E06.3 HASHIMOTO'S THYROIDITIS: Primary | ICD-10-CM

## 2019-02-07 NOTE — TELEPHONE ENCOUNTER
Patient had declined referral to endocrine at appointment - pended referral. Please review/advise.

## 2019-02-28 ENCOUNTER — ANCILLARY PROCEDURE (OUTPATIENT)
Dept: ULTRASOUND IMAGING | Facility: CLINIC | Age: 39
End: 2019-02-28
Payer: COMMERCIAL

## 2019-02-28 DIAGNOSIS — E06.3 HASHIMOTO'S THYROIDITIS: ICD-10-CM

## 2019-02-28 PROCEDURE — 76536 US EXAM OF HEAD AND NECK: CPT

## 2019-04-11 DIAGNOSIS — E06.3 HASHIMOTO'S THYROIDITIS: ICD-10-CM

## 2019-04-11 LAB — TSH SERPL DL<=0.005 MIU/L-ACNC: 0.9 MU/L (ref 0.4–4)

## 2019-04-11 PROCEDURE — 84443 ASSAY THYROID STIM HORMONE: CPT | Performed by: FAMILY MEDICINE

## 2019-04-11 PROCEDURE — 36415 COLL VENOUS BLD VENIPUNCTURE: CPT | Performed by: FAMILY MEDICINE

## 2019-04-11 NOTE — RESULT ENCOUNTER NOTE
[facility], your results were all normal.    Please let me know if you have any questions.    Lavern Harley MD

## 2019-04-29 ENCOUNTER — OFFICE VISIT (OUTPATIENT)
Dept: ENDOCRINOLOGY | Facility: CLINIC | Age: 39
End: 2019-04-29
Attending: FAMILY MEDICINE
Payer: COMMERCIAL

## 2019-04-29 VITALS
OXYGEN SATURATION: 98 % | SYSTOLIC BLOOD PRESSURE: 111 MMHG | BODY MASS INDEX: 28.07 KG/M2 | WEIGHT: 173.9 LBS | DIASTOLIC BLOOD PRESSURE: 71 MMHG | HEART RATE: 82 BPM

## 2019-04-29 DIAGNOSIS — E06.3 HASHIMOTO'S THYROIDITIS: ICD-10-CM

## 2019-04-29 DIAGNOSIS — K58.2 IRRITABLE BOWEL SYNDROME WITH BOTH CONSTIPATION AND DIARRHEA: ICD-10-CM

## 2019-04-29 DIAGNOSIS — R53.83 FATIGUE, UNSPECIFIED TYPE: Primary | ICD-10-CM

## 2019-04-29 LAB
RHEUMATOID FACT SER NEPH-ACNC: <20 IU/ML (ref 0–20)
T3 SERPL-MCNC: 136 NG/DL (ref 60–181)
T4 FREE SERPL-MCNC: 0.79 NG/DL (ref 0.76–1.46)
THYROGLOB AB SERPL IA-ACNC: 111 IU/ML (ref 0–40)
THYROPEROXIDASE AB SERPL-ACNC: 11 IU/ML
TSH SERPL DL<=0.005 MIU/L-ACNC: 1.13 MU/L (ref 0.4–4)

## 2019-04-29 PROCEDURE — 84480 ASSAY TRIIODOTHYRONINE (T3): CPT | Performed by: INTERNAL MEDICINE

## 2019-04-29 PROCEDURE — 84439 ASSAY OF FREE THYROXINE: CPT | Performed by: INTERNAL MEDICINE

## 2019-04-29 PROCEDURE — 86431 RHEUMATOID FACTOR QUANT: CPT | Performed by: INTERNAL MEDICINE

## 2019-04-29 PROCEDURE — 86376 MICROSOMAL ANTIBODY EACH: CPT | Performed by: INTERNAL MEDICINE

## 2019-04-29 PROCEDURE — 99204 OFFICE O/P NEW MOD 45 MIN: CPT | Performed by: INTERNAL MEDICINE

## 2019-04-29 PROCEDURE — 86800 THYROGLOBULIN ANTIBODY: CPT | Performed by: INTERNAL MEDICINE

## 2019-04-29 PROCEDURE — 36415 COLL VENOUS BLD VENIPUNCTURE: CPT | Performed by: INTERNAL MEDICINE

## 2019-04-29 PROCEDURE — 83516 IMMUNOASSAY NONANTIBODY: CPT | Performed by: INTERNAL MEDICINE

## 2019-04-29 PROCEDURE — 86038 ANTINUCLEAR ANTIBODIES: CPT | Performed by: INTERNAL MEDICINE

## 2019-04-29 PROCEDURE — 84443 ASSAY THYROID STIM HORMONE: CPT | Performed by: INTERNAL MEDICINE

## 2019-04-29 NOTE — LETTER
4/29/2019         RE: Deni Simental  [id number] [id number]        Dear Colleague,    Thank you for referring your patient, Deni Simental, to the [facility]. Please see a copy of my visit note below.         Endocrinology Note         Deni is a 38 year old female presents today for Hashimoto's Hypothyroidism    HPI  Deni is a 38 year old female presents today for Hashimoto's hypothyroidism.  Patient is accompanied with her  today.    She states that she has not been feeling well namely fatigue, exhaustion, insomnia, difficulty losing weight, muscle ache and muscle pain for many years. She was told that she thyroid disease since she had her daughter 15 years ago because she has been feeling tired and has difficulty losing weight.  Before she had her son 6 years ago she stated she  lost weight spontaneously down to 132 pounds.  She stated that it was a traumatic pregnancy.    She was initially saw Dr. Wilson in [facility] for thyroiditis. Stated Dr Lynette Powers felt right thyroid nodule and was referred for FNA. However, FNA was canceled since there was no nodule identifiable and the lesion was possibly a benign appearing lymph node.  She also stated she had positive TPO antibody although I do not have the letter to review).  As per patient these were done at an independent clinic on the outside In summer of 2016)  Because she continued to feel tired so was started on low-dose levothyroxine. Since then, she saw  in July 2017 and the patient wished to switch from levothyroxine to Nature thyroid.  [facility] and [facility] July 2017 was within normal limits. She said she initially felt better on thyroid medicadtion but now has gone back to feeling exhausted, fatigue, insomnia and emotionally up-and-down.  She said that she had outer bowel movements between constipation and diarrhea.  She gave up gluten.  She reports sister has  Graves' disease and paternal grandmother has hypothyroidism.  Her father has psoriatic arthritis.    The most recent TSH was 0.9 on 2019.  She is taking Nature thyroid 48.75 mg daily for the past few months.  She request to check all thyroid panel including antibodies.    Past Medical History  Past Medical History:   Diagnosis Date     Allergy     multiple     Ankle sprain 4/10     Arthritis      IUD (intrauterine device) in place     mirena 05     Thyroid disease     hoshimoto's       Allergies  Allergies   Allergen Reactions     Wasp Venom Hives     Swelling past two joints     Higinio Flavor Hives     Red Dye Hives     Retin-A [Tretinoin] Hives     Medications  Current Outpatient Medications   Medication Sig Dispense Refill     rizatriptan (MAXALT) 5 MG tablet TAKE 1-2 TABLETS BY MOUTH AT ONSET OF HEADACHE FOR MIGRAINE. MAY REPEAT IN 2HRS. MAX 6 TAB/24 HRS 18 tablet 3     Thyroid 48.75 MG TABS TAKE 1 TABLET BY MOUTH DAILY 90 tablet 0     Family History  family history includes Alcohol/Drug in her mother; Cancer in her paternal grandfather; Diabetes in her father; Hypertension in her father; Thyroid Disease in her paternal grandmother and sister.   Sister has Graves' disease and paternal grandmother has hypothyroidism.  Her father has psoriatic arthritis.    Social History  Social History     Tobacco Use     Smoking status: Former Smoker     Types: Cigarettes     Last attempt to quit: 2011     Years since quittin.6     Smokeless tobacco: Never Used     Tobacco comment: Lives in smoke free household   Substance Use Topics     Alcohol use: No     Alcohol/week: 0.0 oz     Drug use: No   , lives with her family  He has 2 children  ROS  Constitutional: Difficulty losing weight, feeling tired all the time  Eyes: no vision change, diplopia or red eyes   Neck: no difficulty swallowing, no choking, no neck pain, no neck swelling  Cardiovascular: no chest pain, palpitations  Respiratory: no dyspnea,  cough, shortness of breath or wheezing   GI: no nausea, vomiting, diarrhea or constipation, no abdominal pain   : no change in urine, no dysuria or hematuria  Musculoskeletal:  positive for muscle pain  Integumentary: no concerning lesions   Neuro: no loss of strength or sensation, no numbness or tingling, no tremor, no dizziness, no headache,  positive for brain fog  Endo: no polyuria or polydipsia, positive for hot intolerance  Heme/Lymph: no concerning bumps, no bleeding problems   Allergy: no environmental allergies   Psych: Positive for insomnia    Physical Exam  /71 (BP Location: Left arm, Patient Position: Sitting, Cuff Size: Adult Regular)   Pulse 82   Wt 78.9 kg (173 lb 14.4 oz)   SpO2 98%   BMI 28.07 kg/m     Body mass index is 28.07 kg/m .  Constitutional: no distress, comfortable, feel upset   Eyes: anicteric, normal extra-ocular movements, no lid lag or retraction  Neck: no thyromegaly, no discrete nodule  Cardiovascular: regular rate and rhythm, normal S1 and S2, no murmurs  Respiratory: clear to auscultation, no wheezes or crackles, normal breath sounds   Gastrointestinal:  nontender, no hepatomegaly, no masses   Musculoskeletal: no edema   Skin: no concerning lesions, no jaundice   Neurological: cranial nerves intact, normal strength, 2+ reflexes at patella, normal gait, no tremor on outstretched hands bilaterally  Psychological: appropriate mood   Lymphatic: no cervical  lymphadenopathy.      RESULTS  I have personally reviewed labs and images. I also reviewed labs with patient and discussed the result and plan of care.        US thyroid 4/4/2017  Comparison: Previous ultrasound dated 9/15/2016  The right lobe of the thyroid is 1.7 x 1.7 x 5.6 cm. The thyroid parenchyma appears unremarkable. There are no masses or cysts.  The left lobe of the thyroid is 1.3 x 1.6 x 4.5 cm.  The thyroid parenchyma appears unremarkable. There are no masses or cysts.  The isthmus is unremarkable. No  enlarged nodes or masses are seen adjacent to the thyroid.    The degree of increased thyroid vascularity noted on the previous ultrasound has decreased on the current study.    Impression:   1. Thyroid gland size upper limits of normal.  2. No thyroid nodules.  3. The degree of vascularity within the thyroid gland has decreased since the prior examination.    US thyroid 7/23/2018  Right thyroid is 5.4 x 1.7 x 1.6 cm. Left thyroid is 4.3 x 1.5 x 1.5 cm. Thyroid isthmus is 0.2 cm.     Midline isthmus thyroid nodules newly identified measuring 0.5 x 0.2 x 0.3 cm. It is hypoechoic and may be solid versus complex cystic.                                                                    IMPRESSION: New but very small 0.5 cm isthmus nodule.     US thyroid 2/28/2019  Right thyroid measures 4.9 x 1.4 x 1.6 cm. Left thyroid measures 4.4 x 1.0 x 1.5 cm. Thyroid isthmus is 0.2 cm.     There is a 0.6 x 0.2 x 0.4 cm hypoechoic nodule at the mid and right isthmus, stable. It may be complex cystic versus solid.     Diffuse mildly heterogeneous thyroid parenchyma noted.                                                                    IMPRESSION:  1. Stable small isthmus thyroid nodule.  2. Mildly heterogeneous thyroid parenchyma noted.    ASSESSMENT:    Deni is a 38 year old female presents today for Hashimoto's hypothyroidism    1) Hashimoto hypothyroidism: Reports having long-standing Hashimoto thyroiditis.  However, I am not quite sure that she really has hypothyroidism or requires medication treatment or not because her TFT has been normal prior to starting on the medication.  I have not had medical record from independent clinic that had positive TPO antibodies and borderline TSH per patient.  She is currently taking Nature thyroid 48.75 mg and has normal TSH ~3 weeks ago.  At this time, I will check TSH, free T4, total T3 and antibodies as patient's request.    2) Fatigue: unclear etiology. I do think that we probably  need other work-up beside thyroid function. I will also screen for MARISELA, RF and Celiac test today. She may also need sleep test or sleep clinic referral. Suspected anxiety and depression as well.    3) small subcentimeter thyroid cyst at the isthmus: I have reviewed ultrasound and no suspicious feature. No compression symptoms.    PLAN:   - TSH, free T4, total T3 and antibodies as requested    Vicky Downs MD  Division of Diabetes and Endocrinology  Department of Medicine  [phone]      Again, thank you for allowing me to participate in the care of your patient.        Sincerely,        Vicky Downs MD

## 2019-04-29 NOTE — NURSING NOTE
Deni Simental's goals for this visit include:   Chief Complaint   Patient presents with     Consult     Thyroid Disease     She requests these members of her care team be copied on today's visit information: Yes    PCP: Radha Lay    Referring Provider:  Lavern Harley MD  [address] [place] [id number]    /71 (BP Location: Left arm, Patient Position: Sitting, Cuff Size: Adult Regular)   Pulse 82   Wt 78.9 kg (173 lb 14.4 oz)   SpO2 98%   BMI 28.07 kg/m      Do you need any medication refills at today's visit? Yes

## 2019-04-29 NOTE — PROGRESS NOTES
Endocrinology Note         Deni is a 38 year old female presents today for Hashimoto's Hypothyroidism    HPI  Deni is a 38 year old female presents today for Hashimoto's hypothyroidism.  Patient is accompanied with her  today.    She states that she has not been feeling well namely fatigue, exhaustion, insomnia, difficulty losing weight, muscle ache and muscle pain for many years. She was told that she thyroid disease since she had her daughter 15 years ago because she has been feeling tired and has difficulty losing weight.  Before she had her son 6 years ago she stated she  lost weight spontaneously down to 132 pounds.  She stated that it was a traumatic pregnancy.    She was initially saw Dr. Wilson in [facility] for thyroiditis. Stated Dr Lynette Powers felt right thyroid nodule and was referred for FNA. However, FNA was canceled since there was no nodule identifiable and the lesion was possibly a benign appearing lymph node.  She also stated she had positive TPO antibody although I do not have the letter to review).  As per patient these were done at an independent clinic on the outside In summer of 2016)  Because she continued to feel tired so was started on low-dose levothyroxine. Since then, she saw  in July 2017 and the patient wished to switch from levothyroxine to Nature thyroid.  TPO Ab checked in [facility] and in July 2017 was within normal limits. She said she initially felt better on thyroid medicadtion but now has gone back to feeling exhausted, fatigue, insomnia and emotionally up-and-down.  She said that she had outer bowel movements between constipation and diarrhea.  She gave up gluten.  She reports sister has Graves' disease and paternal grandmother has hypothyroidism.  Her father has psoriatic arthritis.    The most recent TSH was 0.9 on 4/11/2019.  She is taking Nature thyroid 48.75 mg daily for the past few months.  She request to check all thyroid panel  including antibodies.    Past Medical History  Past Medical History:   Diagnosis Date     Allergy     multiple     Ankle sprain 4/10     Arthritis      IUD (intrauterine device) in place     mirena 05     Thyroid disease     hoshimoto's       Allergies  Allergies   Allergen Reactions     Wasp Venom Hives     Swelling past two joints     Higinio Flavor Hives     Red Dye Hives     Retin-A [Tretinoin] Hives     Medications  Current Outpatient Medications   Medication Sig Dispense Refill     rizatriptan (MAXALT) 5 MG tablet TAKE 1-2 TABLETS BY MOUTH AT ONSET OF HEADACHE FOR MIGRAINE. MAY REPEAT IN 2HRS. MAX 6 TAB/24 HRS 18 tablet 3     Thyroid 48.75 MG TABS TAKE 1 TABLET BY MOUTH DAILY 90 tablet 0     Family History  family history includes Alcohol/Drug in her mother; Cancer in her paternal grandfather; Diabetes in her father; Hypertension in her father; Thyroid Disease in her paternal grandmother and sister.   Sister has Graves' disease and paternal grandmother has hypothyroidism.  Her father has psoriatic arthritis.    Social History  Social History     Tobacco Use     Smoking status: Former Smoker     Types: Cigarettes     Last attempt to quit: 2011     Years since quittin.6     Smokeless tobacco: Never Used     Tobacco comment: Lives in smoke free household   Substance Use Topics     Alcohol use: No     Alcohol/week: 0.0 oz     Drug use: No   , lives with her family  He has 2 children  ROS  Constitutional: Difficulty losing weight, feeling tired all the time  Eyes: no vision change, diplopia or red eyes   Neck: no difficulty swallowing, no choking, no neck pain, no neck swelling  Cardiovascular: no chest pain, palpitations  Respiratory: no dyspnea, cough, shortness of breath or wheezing   GI: no nausea, vomiting, diarrhea or constipation, no abdominal pain   : no change in urine, no dysuria or hematuria  Musculoskeletal:  positive for muscle pain  Integumentary: no concerning lesions   Neuro: no  loss of strength or sensation, no numbness or tingling, no tremor, no dizziness, no headache,  positive for brain fog  Endo: no polyuria or polydipsia, positive for hot intolerance  Heme/Lymph: no concerning bumps, no bleeding problems   Allergy: no environmental allergies   Psych: Positive for insomnia    Physical Exam  /71 (BP Location: Left arm, Patient Position: Sitting, Cuff Size: Adult Regular)   Pulse 82   Wt 78.9 kg (173 lb 14.4 oz)   SpO2 98%   BMI 28.07 kg/m    Body mass index is 28.07 kg/m .  Constitutional: no distress, comfortable, feel upset   Eyes: anicteric, normal extra-ocular movements, no lid lag or retraction  Neck: no thyromegaly, no discrete nodule  Cardiovascular: regular rate and rhythm, normal S1 and S2, no murmurs  Respiratory: clear to auscultation, no wheezes or crackles, normal breath sounds   Gastrointestinal:  nontender, no hepatomegaly, no masses   Musculoskeletal: no edema   Skin: no concerning lesions, no jaundice   Neurological: cranial nerves intact, normal strength, 2+ reflexes at patella, normal gait, no tremor on outstretched hands bilaterally  Psychological: appropriate mood   Lymphatic: no cervical  lymphadenopathy.      RESULTS  I have personally reviewed labs and images. I also reviewed labs with patient and discussed the result and plan of care.        US thyroid 4/4/2017  Comparison: Previous ultrasound dated 9/15/2016  The right lobe of the thyroid is 1.7 x 1.7 x 5.6 cm. The thyroid parenchyma appears unremarkable. There are no masses or cysts.  The left lobe of the thyroid is 1.3 x 1.6 x 4.5 cm.  The thyroid parenchyma appears unremarkable. There are no masses or cysts.  The isthmus is unremarkable. No enlarged nodes or masses are seen adjacent to the thyroid.    The degree of increased thyroid vascularity noted on the previous ultrasound has decreased on the current study.    Impression:   1. Thyroid gland size upper limits of normal.  2. No thyroid  nodules.  3. The degree of vascularity within the thyroid gland has decreased since the prior examination.    US thyroid 7/23/2018  Right thyroid is 5.4 x 1.7 x 1.6 cm. Left thyroid is 4.3 x 1.5 x 1.5 cm. Thyroid isthmus is 0.2 cm.     Midline isthmus thyroid nodules newly identified measuring 0.5 x 0.2 x 0.3 cm. It is hypoechoic and may be solid versus complex cystic.                                                                    IMPRESSION: New but very small 0.5 cm isthmus nodule.     US thyroid 2/28/2019  Right thyroid measures 4.9 x 1.4 x 1.6 cm. Left thyroid measures 4.4 x 1.0 x 1.5 cm. Thyroid isthmus is 0.2 cm.     There is a 0.6 x 0.2 x 0.4 cm hypoechoic nodule at the mid and right isthmus, stable. It may be complex cystic versus solid.     Diffuse mildly heterogeneous thyroid parenchyma noted.                                                                    IMPRESSION:  1. Stable small isthmus thyroid nodule.  2. Mildly heterogeneous thyroid parenchyma noted.    ASSESSMENT:    Deni is a 38 year old female presents today for Hashimoto's hypothyroidism    1) Hashimoto hypothyroidism: Reports having long-standing Hashimoto thyroiditis.  However, I am not quite sure that she really has hypothyroidism or requires medication treatment or not because her TFT has been normal prior to starting on the medication.  I have not had medical record from independent clinic that had positive TPO antibodies and borderline TSH per patient.  She is currently taking Nature thyroid 48.75 mg and has normal TSH ~3 weeks ago.  At this time, I will check TSH, free T4, total T3 and antibodies as patient's request.    2) Fatigue: unclear etiology. I do think that we probably need other work-up beside thyroid function. I will also screen for MARISELA, RF and Celiac test today. She may also need sleep test or sleep clinic referral. Suspected anxiety and depression as well.    3) small subcentimeter thyroid cyst at the isthmus: I  have reviewed ultrasound and no suspicious feature. No compression symptoms.    PLAN:   - TSH, free T4, total T3 and antibodies as requested    Vicky Downs MD  Division of Diabetes and Endocrinology  Department of Medicine  [phone]

## 2019-04-30 LAB — ANA SER QL IF: NEGATIVE

## 2019-05-01 LAB
TTG IGA SER-ACNC: <1 U/ML
TTG IGG SER-ACNC: 1 U/ML

## 2019-05-06 DIAGNOSIS — G43.109 MIGRAINE WITH AURA AND WITHOUT STATUS MIGRAINOSUS, NOT INTRACTABLE: ICD-10-CM

## 2019-05-07 RX ORDER — RIZATRIPTAN BENZOATE 5 MG/1
TABLET ORAL
Qty: 18 TABLET | Refills: 1 | Status: SHIPPED | OUTPATIENT
Start: 2019-05-07 | End: 2019-07-25

## 2019-05-07 NOTE — TELEPHONE ENCOUNTER
"Prescription approved per [facility] Refill Protocol. Last order for 18 tablets with 3 refills lasted for 1 year supply.      Requested Prescriptions   Pending Prescriptions Disp Refills     rizatriptan (MAXALT) 5 MG tablet [Pharmacy Med Name: RIZATRIPTAN 5 MG TABLET] 18 tablet 0     Sig: TAKE 1-2 TABLETS BY MOUTH AT ONSET OF HEADACHE FOR MIGRAINE. MAY REPEAT IN 2HRS. MAX 6 TAB/24 HRS       Serotonin Agonists Failed - 5/6/2019  7:01 AM        Failed - Serotonin Agonist request needs review.     Please review patient's record. If patient has had 8 or more treatments in the past month, please forward to provider.        Passed - Blood pressure under 140/90 in past 12 months     BP Readings from Last 3 Encounters:   04/29/19 111/71   02/04/19 108/62   10/01/18 116/70           Passed - Recent (12 mo) or future (30 days) visit within the authorizing provider's specialty     Patient had office visit in the last 12 months or has a visit in the next 30 days with authorizing provider or within the authorizing provider's specialty.  See \"Patient Info\" tab in inbasket, or \"Choose Columns\" in Meds & Orders section of the refill encounter.       Maddison Lowery RN      "

## 2019-05-09 ENCOUNTER — MYC MEDICAL ADVICE (OUTPATIENT)
Dept: FAMILY MEDICINE | Facility: CLINIC | Age: 39
End: 2019-05-09

## 2019-05-09 DIAGNOSIS — E06.3 HASHIMOTO'S THYROIDITIS: ICD-10-CM

## 2019-05-09 NOTE — TELEPHONE ENCOUNTER
"Requested order would need to be \"local printed\" with provider signature to be able to send to [facility] if appropriate. Will route to team to huddle with provider tomorrow.   Maddison Lowery RN    "

## 2019-05-10 ENCOUNTER — E-VISIT (OUTPATIENT)
Dept: FAMILY MEDICINE | Facility: CLINIC | Age: 39
End: 2019-05-10
Payer: COMMERCIAL

## 2019-05-10 DIAGNOSIS — E06.3 HASHIMOTO'S THYROIDITIS: ICD-10-CM

## 2019-05-10 PROCEDURE — 99444 ZZC PHYSICIAN ONLINE EVALUATION & MANAGEMENT SERVICE: CPT | Performed by: FAMILY MEDICINE

## 2019-05-10 RX ORDER — THYROID 30 MG/1
45 TABLET ORAL DAILY
Qty: 135 TABLET | Refills: 1 | Status: SHIPPED | OUTPATIENT
Start: 2019-05-10 | End: 2019-06-10 | Stop reason: DRUGHIGH

## 2019-06-10 ENCOUNTER — OFFICE VISIT (OUTPATIENT)
Dept: ENDOCRINOLOGY | Facility: CLINIC | Age: 39
End: 2019-06-10
Payer: COMMERCIAL

## 2019-06-10 VITALS
WEIGHT: 179.9 LBS | BODY MASS INDEX: 28.91 KG/M2 | HEART RATE: 80 BPM | HEIGHT: 66 IN | SYSTOLIC BLOOD PRESSURE: 119 MMHG | DIASTOLIC BLOOD PRESSURE: 75 MMHG

## 2019-06-10 DIAGNOSIS — L65.9 ALOPECIA: ICD-10-CM

## 2019-06-10 DIAGNOSIS — E06.3 HASHIMOTO'S THYROIDITIS: ICD-10-CM

## 2019-06-10 DIAGNOSIS — Z72.821 POOR SLEEP HYGIENE: ICD-10-CM

## 2019-06-10 DIAGNOSIS — R53.83 FATIGUE, UNSPECIFIED TYPE: ICD-10-CM

## 2019-06-10 DIAGNOSIS — R76.8 THYROGLOBULIN ANTIBODY POSITIVE: ICD-10-CM

## 2019-06-10 DIAGNOSIS — L65.9 ALOPECIA: Primary | ICD-10-CM

## 2019-06-10 DIAGNOSIS — E04.1 THYROID NODULE: ICD-10-CM

## 2019-06-10 LAB
CORTIS SERPL-MCNC: 4.8 UG/DL (ref 4–22)
T3 SERPL-MCNC: 114 NG/DL (ref 60–181)
T4 FREE SERPL-MCNC: 0.9 NG/DL (ref 0.76–1.46)
TSH SERPL DL<=0.005 MIU/L-ACNC: 2.25 MU/L (ref 0.4–4)

## 2019-06-10 RX ORDER — THYROID 15 MG/1
15 TABLET ORAL 3 TIMES DAILY
Qty: 90 TABLET | Refills: 0 | Status: SHIPPED | OUTPATIENT
Start: 2019-06-10 | End: 2019-06-26

## 2019-06-10 ASSESSMENT — MIFFLIN-ST. JEOR: SCORE: 1514.83

## 2019-06-10 ASSESSMENT — PAIN SCALES - GENERAL: PAINLEVEL: NO PAIN (0)

## 2019-06-10 NOTE — LETTER
6/10/2019       RE: Deni Simental  [address] [facility] [id number]     Dear Colleague,    Thank you for referring your patient, Deni Simental, to the [facility] ENDOCRINOLOGY at [facility]. Please see a copy of my visit note below.    Endocrine Consult note    Attending Assessment/Plan :     1.  High LUIS ARMANDO.  This is a marker of thyroid autoimmunity which an be associated with any thyroid state, including hypothyroid, euthyroid or hyperthyroid.  The antibody by itself is sometimes called hashimoto thyroiditis.  However, in Deni's case, the US does not clearly show Hashimoto pattern.  Regardless of what we call it (which seemed an important and upsetting distinction to Deni and her ) , she is on thyroid hormone ([place] thyroid 45 mg/day single dose at  ) for unclear indications. TFTS were normal prior to start of LT4 and the measured labs  have remained normal. I have counseled Deni and her  on this and they were clearly upset by what I had to say.  Recommend changing [place] thyroid to 15 mg tid or bid at a minimum.  Consideration could be given to tapering the dose to see if she can get off of it.   I do not see any evidence of harm that has come from the treatment she has been given     Fatigue - I do not expect this can be fixed with thyroid manipulation.  I have noted this might be multifactorial, including related to her suboptimal sleep, ? Depression.     Poor sleep hygiene; insomnia/ I think this is a significant risk factor for the fatigue symptoms . She has tried extensive self treatment for this problem.  I have suggested she might benefit from seeing a sleep specialist.      Frustration is apparent - especially that of her  who charged me that I don't believe them. I suspect I was unable to convince them that I totally believed them, but I don't fully agree with the diagnosis or treatment she has been given.  This was  "clearly a very upsetting appt for both of them as I was not telling them what they expected or wanted to hear.      Alopecia concern.  Temporal recession; shaves hair upper lip once/ month x 4-5 years.  Labs to include PM cortisol, androgens.   I have suggested she might benefit from seeing dermatology.     subcm thyroid nodule isthmus - Discussed.  Next US could be in a few years.      Marlin Richards MD      Chief complaint/HISTORY OF PRESENT ILLNESS  I am at least the 4th endocrinologist Deni has seen. Deni is a new patient ot me but she has been seen by Dr Downs in our clinic 19.  Prior to that she had seen community endocrinologist Dr Driscoll in 2017 and Dr Lynette Arrington in 2016.  I have reviewed Care Everywhere including [facility] lab reports, imaging reports and provider notes as indicated.  Maura presents today along with her .  She reports she has \"Problems with the thyroid\" .They say the only reason she is here today is because they couldn't get in fast enough with Dr Lay, her PCP.      Maura states she ast felt well - 5 -6 years ago, prior to the conception of her 2nd child.   She describes the problem starting with  low energy and then 35# weight loss in 9 months down to 130, followed by  pregnancy # 2.  The pregnancy was difficult, including a lot of pain and abdom cramps/ stabbing; pregnancy indueced HTN; complete placenta previa on bedrest from 16 weeks;  contractions, pre-eclampsia, induced.  She says she gained over 200 lbs, from  130 to 340-350 lbs (the record shows her weight was 261 on 5/15/13).  She states she continued to gain weight and have HTN after the birth.   She never recovered from that pregnancy,    LT4 was started by Dr Lynette Arrington 16 when the TFTS were normal. Deni emphasizes that she paid $500 out of pocket to have tests run through [email or web address], ordered by a  Chiropractor, which she then took . I was able to locate " the [facility] lab report dated 8/16/16 on the media tab with results as indicated below, all normal except the antithyroglobulin antibody.   The [facility]  labs prior to start of LT4 treatment included TSH 1.47, free T4 0.97.  Today she recalls that she felt good for the lst month or so after the treatment but later she felt more exhausted.  LT4 was changed to [place] thyroid by Dr Driscoll on 10/30/17.  Following the 2/4/19 TSH of 3.7 the dose increased to Nature throid 48.75 mg/day.  However, 5/10/19 she had to change to [place] thyroid 45 mg/day after she couldn't get the Nature Throid. mg on 5/10/19. With this change she  feels like shit hit the fan. She is vutting a 30 mg tablet to get to the 45 mg dose - doesn't like cutting the pills. She takes the dose once/day at NIGHT.       I have reviewed all TFTS on our system which date to 2010.  All TSh levels have been normal in the same range.   Other / specific labs  11/14/03 estriol 2.17  12/28/04 ([facility]): TSH 0.52  12/28/05: TSH 0.52  5/7/10 HgbA1c 5.4%  9/10/15: LH 2.7, FSH 2  8/16/16 ([facility]): TSH 1.530, T4 7.4, T3 uptake 27%, free T4 inde 2 (1.2-4.9), T3 141 ng/dl, free T3 3.3, reverse T3 17.8, free T4 1.08, TPO 16, LUIS ARMANDO 6.8 (0-0.9), 25OHD 40.6, HgbA1c 5%, glucose 83, creatinine 0.71, Na 138, K 3.7, LFTs normal, ferritin 27, others  9/26/16 ([facility]) : TSH 1.47, free T4 0.97  10/18/16 TSH 0.77, free t4 1.04  7/5/17: TSH 1.24, free T4 1.13, T3 102  10/20/17 : TSH 1.03, free T4 1.01, T3 107  12/15/16: TSH 1.25  10/1/18: B12 580  2/4/19 Ferritin 30, TSH 3.7, vitamin D 26  4/29/19 TSH 1.47, free T4 0.97, LUIS ARMANDO 111, TPO 11 TTG IgG 1, TTG IgA < 1, RF < 20  6/10/19: TSH 2.25, free T4 0.9, T3 114.     Imaging  9/30/16 US FNAB ordered by Dr Arrington ([facility]) cancelled due to no nodule being present  4/4/17 thyroid US ([facility]): normal  2/28/19 thyroid US ( [facility]): as read by me:   Isthmus nodule 0.6 x 0.2 x 0.4 hypoechoic (was 0.5 x 0.2 x 0.3 7/18;     REVIEW OF  SYSTEMS  Feels like on rollercoatser  Hair Is falling out again She has not seen derm.    Shaves hair upper lip weekly   Weight up 10#  Weight baseline around 170, could get to 155 but will return to 150-185.  Don't sleep well at all -- can't stay asleep; bedtime 10 PM, lately 9 PM; wakes up 2-3 AM; up at 530 AM for work; on weekends sleeps until as late as 1130  Per : sleep x 9 hours, wiped out after 2 hours activity; sleeps a lot; home from work and can't function.    Things she has tried for sleep that did't work: melatonin, Zquil; aroma therapy; Mg supplements; no coffee after noon.    Always tired Biggest problem is the exhuastion - and this leads to the migraines.    Cardiac: negative  Respiratory: COBIAN someitmes walking across the room  GI: BM x 1 or less .  + nausea and abdominal pain 3-4 days/week- constipated.    + migraines   Daily tension headache  Joint pain in finter tips , elbow, shoulder, knees and ankles  Muscle pain in legs  And low back  Menarche 13 years of   Menses monthly - could e 1-2 weeks late-- symptoms are the worst when this happens.    lst baby 2004, 2013 2nd baby   charged me that I don't believe them -   Doesn't believe she has depression .  10 system ROS otherwise as per the HPI or negative    Past Medical History  Past Medical History:   Diagnosis Date     Allergy     multiple     Ankle sprain 4/10     Arthritis      Depression     in teens -      Hashimoto's thyroiditis      IUD (intrauterine device) in place     mirena 6/20/05     Past Surgical History:   Procedure Laterality Date     HC TOOTH EXTRACTION W/FORCEP         Medications    Current Outpatient Medications   Medication Sig Dispense Refill     rizatriptan (MAXALT) 5 MG tablet TAKE 1-2 TABLETS BY MOUTH AT ONSET OF HEADACHE FOR MIGRAINE. MAY REPEAT IN 2HRS. MAX 6 TAB/24 HRS 18 tablet 1     thyroid (ARMOUR) 15 MG tablet Take 1 tablet (15 mg) by mouth 3 times daily 90 tablet 0     Last [place] dose was last nigth  "at 950PM;    Allergies  Allergies   Allergen Reactions     Wasp Venom Hives     Swelling past two joints     Higinio Flavor Hives     Red Dye Hives     Retin-A [Tretinoin] Hives       Family History  family history includes Alcohol/Drug in her mother; Cancer in her paternal grandfather; Diabetes in her father; Graves' disease in her sister; Hypertension in her father; Hypothyroidism in her paternal grandmother; Psoriatic Arthritis in her father.    Social History  Social History     Tobacco Use     Smoking status: Former Smoker     Types: Cigarettes     Last attempt to quit: 2011     Years since quittin.7     Smokeless tobacco: Never Used     Tobacco comment: Lives in smoke free household   Substance Use Topics     Alcohol use: No     Alcohol/week: 0.0 oz     Drug use: No    - starts work early AM done at 4 PM; 2 kids; ;  works nights    Physical Exam  /75   Pulse 80   Ht 1.664 m (5' 5.5\")   Wt 81.6 kg (179 lb 14.4 oz)   BMI 29.48 kg/m     Body mass index is 29.48 kg/m .  GENERAL : young woman  In no apparent distress. :At times she looked very tired as if she might fall asleep.    SKIN: Normal color, normal temperature, texture.  No gross hirsutism, or purple striae.   Some temporal recession (minimal) with baby hairs regrowth    EYES: PERRL, EOMI, No scleral icterus,  No proptosis, conjunctival redness, stare, retraction  MOUTH: Moist, pink; pharynx clear  NECK: No visible masses. No palpable adenopathy, or masses. No carotid bruits.   THYROID:  Palpable, not enlarged.   RESP: Lungs clear to auscultation bilaterally  CARDIAC: Regular rate and rhythm, normal S1 S2, without murmurs, rubs or gallops    ABDOMEN: Normal bowel sounds; soft, nontender, no HSM or masses       NEURO: awake, alert, responds appropriately to questions.  Cranial nerves intact.  Moves all extremities; Gait normal.  No tremor of the outstretched hand.  DTRs  2 /4 ,   EXTREMITIES: No clubbing, cyanosis " or edema.    DATA REVIEW    [facility] 6/10/2019 4/29/2019   TSH 0.40 - 4.00 mU/L 2.25 1.13   T4 FREE 0.76 - 1.46 ng/dL 0.90 0.79   FREE T3 2.3 - 4.2 pg/mL     TRIIODOTHYRONINE(T3) 60 - 181 ng/dL 114 136   THYROGLOBULIN ANTIBODY <40 IU/mL  111 (H)   THYR PEROXIDASE GABRIEL <35 IU/mL  11     [facility] 4/11/2019 2/4/2019   TSH 0.40 - 4.00 mU/L 0.90 3.70   T4 FREE 0.76 - 1.46 ng/dL     FREE T3 2.3 - 4.2 pg/mL  2.9   TRIIODOTHYRONINE(T3) 60 - 181 ng/dL     THYROGLOBULIN ANTIBODY <40 IU/mL     THYR PEROXIDASE GABRIEL <35 IU/mL       [facility] Lat[facility] Units 8/20/2018 1/22/2018   TSH 0.40 - 4.00 mU/L 1.10 0.73   T4 FREE 0.76 - 1.46 ng/dL     FREE T3 2.3 - 4.2 pg/mL 3.0    TRIIODOTHYRONINE(T3) 60 - 181 ng/dL     THYROGLOBULIN ANTIBODY <40 IU/mL     THYR PEROXIDASE GABRIEL <35 IU/mL       [facility] Lat[facility] Units 9/10/2015 5/7/2010   TSH 0.40 - 4.00 mU/L 1.34 0.85   T4 FREE 0.76 - 1.46 ng/dL     FREE T3 2.3 - 4.2 pg/mL     TRIIODOTHYRONINE(T3) 60 - 181 ng/dL     THYROGLOBULIN ANTIBODY <40 IU/mL     THYR PEROXIDASE GABRIEL <35 IU/mL          Ref. Range 6/10/2019 18:16   Cortisol Serum Latest Ref Range: 4 - 22 ug/dL 4.8       Clinical History: Ultrasound Scan Abnormal    Comparison: Previous ultrasound dated 9/15/2016    Findings:    The right lobe of the thyroid is 1.7 x 1.7 x 5.6 cm. The thyroid parenchyma appears unremarkable. There are no masses or cysts.    The left lobe of the thyroid is 1.3 x 1.6 x 4.5 cm.  The thyroid parenchyma appears unremarkable. There are no masses or cysts.    The isthmus is unremarkable. No enlarged nodes or masses are seen adjacent to the thyroid.    The degree of increased thyroid vascularity noted on the previous ultrasound has decreased on the current study.    Impression:   1. Thyroid gland size upper limits of normal.  2. No thyroid nodules.  3. The degree of vascularity within the thyroid gland  has decreased since the prior examination.       Canceled biopsy-no charge    Clinical History: This 35-year-old female has been diagnosed with   Hashimoto's thyroiditis. Thyroid ultrasound was performed on   9/15/2016 which showed increased flow compatible with thyroiditis. No   thyroid nodules. A morphologically normal lymph node is seen adjacent   to the right lobe of the thyroid gland.    The patient was referred for biopsy. I reviewed the images and   discussed this by telephone with her endocrinologist, Dr. Roopa Arrington. As there is no target for biopsy, biopsy was deferred. I spoke   with the patient and her . The patient will follow up with her   endocrinologist, as per the original plan.      Canceled biopsy-no charge    Clinical History: This 35-year-old female has been diagnosed with   Hashimoto's thyroiditis. Thyroid ultrasound was performed on   9/15/2016 which showed increased flow compatible with thyroiditis. No   thyroid nodules. A morphologically normal lymph node is seen adjacent   to the right lobe of the thyroid gland.    The patient was referred for biopsy. I reviewed the images and   discussed this by telephone with her endocrinologist, Dr. Roopa Arrington. As there is no target for biopsy, biopsy was deferred. I spoke   with the patient and her . The patient will follow up with her   endocrinologist, as per the original plan.    ULTRASOUND THYROID   2/28/2019 9:18 AM      HISTORY: Hashimoto's thyroiditis.     COMPARISON: Ultrasound thyroid 7/23/2018.     FINDINGS:   Right thyroid measures 4.9 x 1.4 x 1.6 cm. Left thyroid measures 4.4 x  1.0 x 1.5 cm. Thyroid isthmus is 0.2 cm.     There is a 0.6 x 0.2 x 0.4 cm hypoechoic nodule at the mid and right  isthmus, stable. It may be complex cystic versus solid.     Diffuse mildly heterogeneous thyroid parenchyma noted.                                                                      IMPRESSION:  1. Stable small isthmus thyroid  nodule.  2. Mildly heterogeneous thyroid parenchyma noted.     MIKAYLA LUNA MD    Again, thank you for allowing me to participate in the care of your patient.      Sincerely,    Marlin Richards MD

## 2019-06-10 NOTE — PROGRESS NOTES
Endocrine Consult note    Attending Assessment/Plan :     1.  High LUIS ARMANDO.  This is a marker of thyroid autoimmunity which an be associated with any thyroid state, including hypothyroid, euthyroid or hyperthyroid.  The antibody by itself is sometimes called hashimoto thyroiditis.  However, in Deni's case, the US does not clearly show Hashimoto pattern.  Regardless of what we call it (which seemed an important and upsetting distinction to Deni and her ) , she is on thyroid hormone ([place] thyroid 45 mg/day single dose at HS ) for unclear indications. TFTS were normal prior to start of LT4 and the measured labs  have remained normal. I have counseled Deni and her  on this and they were clearly upset by what I had to say.  Recommend changing [place] thyroid to 15 mg tid or bid at a minimum.  Consideration could be given to tapering the dose to see if she can get off of it.   I do not see any evidence of harm that has come from the treatment she has been given     Fatigue - I do not expect this can be fixed with thyroid manipulation.  I have noted this might be multifactorial, including related to her suboptimal sleep, ? Depression.     Poor sleep hygiene; insomnia/ I think this is a significant risk factor for the fatigue symptoms . She has tried extensive self treatment for this problem.  I have suggested she might benefit from seeing a sleep specialist.      Frustration is apparent - especially that of her  who charged me that I don't believe them. I suspect I was unable to convince them that I totally believed them, but I don't fully agree with the diagnosis or treatment she has been given.  This was clearly a very upsetting appt for both of them as I was not telling them what they expected or wanted to hear.      Alopecia concern.  Temporal recession; shaves hair upper lip once/ month x 4-5 years.  Labs to include PM cortisol, androgens.   I have suggested she might benefit from seeing  "dermatology.     Addendum: All androgens measured at the appt were normal - Androstenedione, DHEAS, testosterone.  The PM cortisol was normal.   Would recommend dermatology evaluation as next step.  If they don't find anything or offer her treatment, would consider spironolactone (plus birth control).    subcm thyroid nodule isthmus - Discussed.  Next US could be in a few years.      Marlin Richards MD      Chief complaint/HISTORY OF PRESENT ILLNESS  I am at least the 4th endocrinologist Deni has seen. Deni is a new patient ot me but she has been seen by Dr Downs in our clinic 19.  Prior to that she had seen community endocrinologist Dr Driscoll in 2017 and Dr Lynette Arrington in 2016.  I have reviewed Care Everywhere including [facility] lab reports, imaging reports and provider notes as indicated.  Maura presents today along with her .  She reports she has \"Problems with the thyroid\" .They say the only reason she is here today is because they couldn't get in fast enough with Dr Lay, her PCP.      Deni states she ast felt well - 5 -6 years ago, prior to the conception of her 2nd child.   She describes the problem starting with  low energy and then 35# weight loss in 9 months down to 130, followed by  pregnancy # 2.  The pregnancy was difficult, including a lot of pain and abdom cramps/ stabbing; pregnancy indueced HTN; complete placenta previa on bedrest from 16 weeks;  contractions, pre-eclampsia, induced.  She says she gained over 200 lbs, from  130 to 340-350 lbs (the record shows her weight was 261 on 5/15/13).  She states she continued to gain weight and have HTN after the birth.   She never recovered from that pregnancy,    LT4 was started by Dr Lynette Arrington 16 when the TFTS were normal. Deni emphasizes that she paid $500 out of pocket to have tests run through [facility], ordered by a  Chiropractor, which she then took . I was able to locate the " [facility] lab report dated 8/16/16 on the media tab with results as indicated below, all normal except the antithyroglobulin antibody.   The [facility]  labs prior to start of LT4 treatment included TSH 1.47, free T4 0.97.  Today she recalls that she felt good for the lst month or so after the treatment but later she felt more exhausted.  LT4 was changed to [place] thyroid by Dr Driscoll on 10/30/17.  Following the 2/4/19 TSH of 3.7 the dose increased to [place] throid 48.75 mg/day.  However, 5/10/19 she had to change to [place] thyroid 45 mg/day after she couldn't get the [place] Throid. mg on 5/10/19. With this change she  feels like shit hit the fan. She is vutting a 30 mg tablet to get to the 45 mg dose - doesn't like cutting the pills. She takes the dose once/day at NIGHT.       I have reviewed all TFTS on our system which date to 2010.  All TSh levels have been normal in the same range.   Other / specific labs  11/14/03 estriol 2.17  12/28/04 ([facility]): TSH 0.52  12/28/05: TSH 0.52  5/7/10 HgbA1c 5.4%  9/10/15: LH 2.7, FSH 2  8/16/16 ([facility]): TSH 1.530, T4 7.4, T3 uptake 27%, free T4 inde 2 (1.2-4.9), T3 141 ng/dl, free T3 3.3, reverse T3 17.8, free T4 1.08, TPO 16, LUIS ARMANDO 6.8 (0-0.9), 25OHD 40.6, HgbA1c 5%, glucose 83, creatinine 0.71, Na 138, K 3.7, LFTs normal, ferritin 27, others  9/26/16 ([facility]) : TSH 1.47, free T4 0.97  10/18/16 TSH 0.77, free t4 1.04  7/5/17: TSH 1.24, free T4 1.13, T3 102  10/20/17 : TSH 1.03, free T4 1.01, T3 107  12/15/16: TSH 1.25  10/1/18: B12 580  2/4/19 Ferritin 30, TSH 3.7, vitamin D 26  4/29/19 TSH 1.47, free T4 0.97, LUIS ARMANDO 111, TPO 11 TTG IgG 1, TTG IgA < 1, RF < 20  6/10/19: TSH 2.25, free T4 0.9, T3 114.     Imaging  9/30/16 US FNAB ordered by Dr Arrington ([facility]) cancelled due to no nodule being present  4/4/17 thyroid US ([facility]): normal  2/28/19 thyroid US ( [facility]): as read by me:   Isthmus nodule 0.6 x 0.2 x 0.4 hypoechoic (was 0.5 x 0.2 x 0.3 7/18;     REVIEW OF SYSTEMS  Feels  like on rollercoatser  Hair Is falling out again She has not seen derm.    Shaves hair upper lip weekly   Weight up 10#  Weight baseline around 170, could get to 155 but will return to 150-185.  Don't sleep well at all -- can't stay asleep; bedtime 10 PM, lately 9 PM; wakes up 2-3 AM; up at 530 AM for work; on weekends sleeps until as late as 1130  Per : sleep x 9 hours, wiped out after 2 hours activity; sleeps a lot; home from work and can't function.    Things she has tried for sleep that did't work: melatonin, Zquil; aroma therapy; Mg supplements; no coffee after noon.    Always tired Biggest problem is the exhuastion - and this leads to the migraines.    Cardiac: negative  Respiratory: COBIAN someitmes walking across the room  GI: BM x 1 or less .  + nausea and abdominal pain 3-4 days/week- constipated.    + migraines   Daily tension headache  Joint pain in finter tips , elbow, shoulder, knees and ankles  Muscle pain in legs  And low back  Menarche 13 years of   Menses monthly - could e 1-2 weeks late-- symptoms are the worst when this happens.    lst baby 2004, 2013 2nd baby   charged me that I don't believe them -   Doesn't believe she has depression .  10 system ROS otherwise as per the HPI or negative    Past Medical History  Past Medical History:   Diagnosis Date     Allergy     multiple     Ankle sprain 4/10     Arthritis      Depression     in teens -      Hashimoto's thyroiditis      IUD (intrauterine device) in place     mirena 6/20/05     Past Surgical History:   Procedure Laterality Date     HC TOOTH EXTRACTION W/FORCEP         Medications    Current Outpatient Medications   Medication Sig Dispense Refill     rizatriptan (MAXALT) 5 MG tablet TAKE 1-2 TABLETS BY MOUTH AT ONSET OF HEADACHE FOR MIGRAINE. MAY REPEAT IN 2HRS. MAX 6 TAB/24 HRS 18 tablet 1     thyroid (ARMOUR) 15 MG tablet Take 1 tablet (15 mg) by mouth 3 times daily 90 tablet 0     Last [place] dose was last nigth at  "950PM;    Allergies  Allergies   Allergen Reactions     Wasp Venom Hives     Swelling past two joints     Higinio Flavor Hives     Red Dye Hives     Retin-A [Tretinoin] Hives       Family History  family history includes Alcohol/Drug in her mother; Cancer in her paternal grandfather; Diabetes in her father; Graves' disease in her sister; Hypertension in her father; Hypothyroidism in her paternal grandmother; Psoriatic Arthritis in her father.    Social History  Social History     Tobacco Use     Smoking status: Former Smoker     Types: Cigarettes     Last attempt to quit: 2011     Years since quittin.7     Smokeless tobacco: Never Used     Tobacco comment: Lives in smoke free household   Substance Use Topics     Alcohol use: No     Alcohol/week: 0.0 oz     Drug use: No    - starts work early AM done at 4 PM; 2 kids; ;  works nights    Physical Exam  /75   Pulse 80   Ht 1.664 m (5' 5.5\")   Wt 81.6 kg (179 lb 14.4 oz)   BMI 29.48 kg/m    Body mass index is 29.48 kg/m .  GENERAL : young woman  In no apparent distress. :At times she looked very tired as if she might fall asleep.    SKIN: Normal color, normal temperature, texture.  No gross hirsutism, or purple striae.   Some temporal recession (minimal) with baby hairs regrowth    EYES: PERRL, EOMI, No scleral icterus,  No proptosis, conjunctival redness, stare, retraction  MOUTH: Moist, pink; pharynx clear  NECK: No visible masses. No palpable adenopathy, or masses. No carotid bruits.   THYROID:  Palpable, not enlarged.   RESP: Lungs clear to auscultation bilaterally  CARDIAC: Regular rate and rhythm, normal S1 S2, without murmurs, rubs or gallops    ABDOMEN: Normal bowel sounds; soft, nontender, no HSM or masses       NEURO: awake, alert, responds appropriately to questions.  Cranial nerves intact.  Moves all extremities; Gait normal.  No tremor of the outstretched hand.  DTRs  2 /4 ,   EXTREMITIES: No clubbing, cyanosis or " edema.    DATA REVIEW    [facility] Units 6/10/2019 4/29/2019   TSH 0.40 - 4.00 mU/L 2.25 1.13   T4 FREE 0.76 - 1.46 ng/dL 0.90 0.79   FREE T3 2.3 - 4.2 pg/mL     TRIIODOTHYRONINE(T3) 60 - 181 ng/dL 114 136   THYROGLOBULIN ANTIBODY <40 IU/mL  111 (H)   THYR PEROXIDASE GABRIEL <35 IU/mL  11     [facility] Latest Ref Rng & Units 4/11/2019 2/4/2019   TSH 0.40 - 4.00 mU/L 0.90 3.70   T4 FREE 0.76 - 1.46 ng/dL     FREE T3 2.3 - 4.2 pg/mL  2.9   TRIIODOTHYRONINE(T3) 60 - 181 ng/dL     THYROGLOBULIN ANTIBODY <40 IU/mL     THYR PEROXIDASE GABRIEL <35 IU/mL       [facility] Latest Ref Rng & Units 8/20/2018 1/22/2018   TSH 0.40 - 4.00 mU/L 1.10 0.73   T4 FREE 0.76 - 1.46 ng/dL     FREE T3 2.3 - 4.2 pg/mL 3.0    TRIIODOTHYRONINE(T3) 60 - 181 ng/dL     THYROGLOBULIN ANTIBODY <40 IU/mL     THYR PEROXIDASE GABRIEL <35 IU/mL       [facility] Latest Ref Rng & Units 9/10/2015 5/7/2010   TSH 0.40 - 4.00 mU/L 1.34 0.85   T4 FREE 0.76 - 1.46 ng/dL     FREE T3 2.3 - 4.2 pg/mL     TRIIODOTHYRONINE(T3) 60 - 181 ng/dL     THYROGLOBULIN ANTIBODY <40 IU/mL     THYR PEROXIDASE GABRIEL <35 IU/mL        Ref. Range 6/10/2019 18:16   Androstenedione Latest Ref Range: 0.260 - 2.140 ng/mL 0.604   Cortisol Serum Latest Ref Range: 4 - 22 ug/dL 4.8   DHEA Sulfate Latest Ref Range: 35 - 430 ug/dL 90   T4 Free Latest Ref Range: 0.76 - 1.46 ng/dL 0.90   Testosterone Total Latest Ref Range: 8 - 60 ng/dL 13   Triiodothyronine (T3) Latest Ref Range: 60 - 181 ng/dL 114   TSH Latest Ref Range: 0.40 - 4.00 mU/L 2.25       Clinical History: Ultrasound Scan Abnormal    Comparison: Previous ultrasound dated 9/15/2016    Findings:    The right lobe of the thyroid is 1.7 x 1.7 x 5.6 cm. The thyroid parenchyma appears unremarkable. There are no masses or cysts.    The left lobe of the thyroid is 1.3 x 1.6 x 4.5 cm.  The thyroid parenchyma appears unremarkable. There are no masses or cysts.    The isthmus is unremarkable.  No enlarged nodes or masses are seen adjacent to the thyroid.    The degree of increased thyroid vascularity noted on the previous ultrasound has decreased on the current study.    Impression:   1. Thyroid gland size upper limits of normal.  2. No thyroid nodules.  3. The degree of vascularity within the thyroid gland has decreased since the prior examination.       Canceled biopsy-no charge    Clinical History: This 35-year-old female has been diagnosed with   Hashimoto's thyroiditis. Thyroid ultrasound was performed on   9/15/2016 which showed increased flow compatible with thyroiditis. No   thyroid nodules. A morphologically normal lymph node is seen adjacent   to the right lobe of the thyroid gland.    The patient was referred for biopsy. I reviewed the images and   discussed this by telephone with her endocrinologist, Dr. Roopa Arrington. As there is no target for biopsy, biopsy was deferred. I spoke   with the patient and her . The patient will follow up with her   endocrinologist, as per the original plan.      Canceled biopsy-no charge    Clinical History: This 35-year-old female has been diagnosed with   Hashimoto's thyroiditis. Thyroid ultrasound was performed on   9/15/2016 which showed increased flow compatible with thyroiditis. No   thyroid nodules. A morphologically normal lymph node is seen adjacent   to the right lobe of the thyroid gland.    The patient was referred for biopsy. I reviewed the images and   discussed this by telephone with her endocrinologist, Dr. Roopa Arrington. As there is no target for biopsy, biopsy was deferred. I spoke   with the patient and her . The patient will follow up with her   endocrinologist, as per the original plan.    ULTRASOUND THYROID   2/28/2019 9:18 AM      HISTORY: Hashimoto's thyroiditis.     COMPARISON: Ultrasound thyroid 7/23/2018.     FINDINGS:   Right thyroid measures 4.9 x 1.4 x 1.6 cm. Left thyroid measures 4.4 x  1.0 x 1.5 cm. Thyroid  isthmus is 0.2 cm.     There is a 0.6 x 0.2 x 0.4 cm hypoechoic nodule at the mid and right  isthmus, stable. It may be complex cystic versus solid.     Diffuse mildly heterogeneous thyroid parenchyma noted.                                                                      IMPRESSION:  1. Stable small isthmus thyroid nodule.  2. Mildly heterogeneous thyroid parenchyma noted.     MIKAYLA LUNA MD

## 2019-06-11 LAB — DHEA-S SERPL-MCNC: 90 UG/DL (ref 35–430)

## 2019-06-12 LAB — TESTOST SERPL-MCNC: 13 NG/DL (ref 8–60)

## 2019-06-13 NOTE — PROGRESS NOTES
"Subjective     Deni Simental is a 38 year old female who presents to clinic today for the following health issues:    HPI   Hypothyroidism Follow-up      Since last visit, patient describes the following symptoms: { :840169::\"Weight stable, no hair loss, no skin changes, no constipation, no loose stools\"}      Amount of exercise or physical activity: {Exercise frequency days per week:512948}    Problems taking medications regularly: {Med Problems:555267::\"No\"}    Medication side effects: {CHRONIC MED SIDE EFFECTS:910720::\"none\"}    Diet: { :514820}      {additonal problems for provider to add (Optional):986567}    {HIST REVIEW/ LINKS 2 (Optional):488938}    Reviewed and updated as needed this visit by Provider         Review of Systems   {ROS COMP (Optional):359903}      Objective    There were no vitals taken for this visit.  There is no height or weight on file to calculate BMI.  Physical Exam   {Exam List (Optional):444176}    {Diagnostic Test Results (Optional):382736::\"Diagnostic Test Results:\",\"Labs reviewed in Epic\"}        {PROVIDER CHARTING PREFERENCE:625807}    "

## 2019-06-14 LAB — ANDROST SERPL-MCNC: 0.6 NG/ML (ref 0.26–2.14)

## 2019-06-26 ENCOUNTER — OFFICE VISIT (OUTPATIENT)
Dept: FAMILY MEDICINE | Facility: CLINIC | Age: 39
End: 2019-06-26
Payer: COMMERCIAL

## 2019-06-26 VITALS
RESPIRATION RATE: 16 BRPM | BODY MASS INDEX: 29.86 KG/M2 | HEART RATE: 69 BPM | WEIGHT: 182.2 LBS | DIASTOLIC BLOOD PRESSURE: 75 MMHG | SYSTOLIC BLOOD PRESSURE: 117 MMHG | TEMPERATURE: 98.1 F

## 2019-06-26 DIAGNOSIS — E06.3 HASHIMOTO'S THYROIDITIS: ICD-10-CM

## 2019-06-26 DIAGNOSIS — R79.0 LOW FERRITIN: Primary | ICD-10-CM

## 2019-06-26 PROCEDURE — 99214 OFFICE O/P EST MOD 30 MIN: CPT | Performed by: FAMILY MEDICINE

## 2019-06-26 RX ORDER — THYROID 15 MG/1
15 TABLET ORAL 3 TIMES DAILY
Qty: 270 TABLET | Refills: 0 | Status: SHIPPED | OUTPATIENT
Start: 2019-06-26 | End: 2019-10-24

## 2019-06-26 NOTE — LETTER
My Depression Action Plan  Name: Deni Simental   Date of Birth 1980  Date: 6/13/2019    My doctor: Radha Lay   My clinic: [facility]  [id number] [place] [id number] [phone]          GREEN    ZONE   Good Control    What it looks like:     Things are going generally well. You have normal up s and down s. You may even feel depressed from time to time, but bad moods usually last less than a day.   What you need to do:  1. Continue to care for yourself (see self care plan)  2. Check your depression survival kit and update it as needed  3. Follow your physician s recommendations including any medication.  4. Do not stop taking medication unless you consult with your physician first.           YELLOW         ZONE Getting Worse    What it looks like:     Depression is starting to interfere with your life.     It may be hard to get out of bed; you may be starting to isolate yourself from others.    Symptoms of depression are starting to last most all day and this has happened for several days.     You may have suicidal thoughts but they are not constant.   What you need to do:     1. Call your care team, your response to treatment will improve if you keep your care team informed of your progress. Yellow periods are signs an adjustment may need to be made.     2. Continue your self-care, even if you have to fake it!    3. Talk to someone in your support network    4. Open up your depression survival kit           RED    ZONE Medical Alert - Get Help    What it looks like:     Depression is seriously interfering with your life.     You may experience these or other symptoms: You can t get out of bed most days, can t work or engage in other necessary activities, you have trouble taking care of basic hygiene, or basic responsibilities, thoughts of suicide or death that will not go away, self-injurious behavior.     What you need to do:  1. Call your care team  and request a same-day appointment. If they are not available (weekends or after hours) call your local crisis line, emergency room or 911.            Depression Self Care Plan / Survival Kit    Self-Care for Depression  Here s the deal. Your body and mind are really not as separate as most people think.  What you do and think affects how you feel and how you feel influences what you do and think. This means if you do things that people who feel good do, it will help you feel better.  Sometimes this is all it takes.  There is also a place for medication and therapy depending on how severe your depression is, so be sure to consult with your medical provider and/ or Behavioral Health Consultant if your symptoms are worsening or not improving.     In order to better manage my stress, I will:    Exercise  Get some form of exercise, every day. This will help reduce pain and release endorphins, the  feel good  chemicals in your brain. This is almost as good as taking antidepressants!  This is not the same as joining a gym and then never going! (they count on that by the way ) It can be as simple as just going for a walk or doing some gardening, anything that will get you moving.      Hygiene   Maintain good hygiene (Get out of bed in the morning, Make your bed, Brush your teeth, Take a shower, and Get dressed like you were going to work, even if you are unemployed).  If your clothes don't fit try to get ones that do.    Diet  I will strive to eat foods that are good for me, drink plenty of water, and avoid excessive sugar, caffeine, alcohol, and other mood-altering substances.  Some foods that are helpful in depression are: complex carbohydrates, B vitamins, flaxseed, fish or fish oil, fresh fruits and vegetables.    Psychotherapy  I agree to participate in Individual Therapy (if recommended).    Medication  If prescribed medications, I agree to take them.  Missing doses can result in serious side effects.  I understand  that drinking alcohol, or other illicit drug use, may cause potential side effects.  I will not stop my medication abruptly without first discussing it with my provider.    Staying Connected With Others  I will stay in touch with my friends, family members, and my primary care provider/team.    Use your imagination  Be creative.  We all have a creative side; it doesn t matter if it s oil painting, sand castles, or mud pies! This will also kick up the endorphins.    Witness Beauty  (AKA stop and smell the roses) Take a look outside, even in mid-winter. Notice colors, textures. Watch the squirrels and birds.     Service to others  Be of service to others.  There is always someone else in need.  By helping others we can  get out of ourselves  and remember the really important things.  This also provides opportunities for practicing all the other parts of the program.    Humor  Laugh and be silly!  Adjust your TV habits for less news and crime-drama and more comedy.    Control your stress  Try breathing deep, massage therapy, biofeedback, and meditation. Find time to relax each day.     My support system    Clinic Contact:  Phone number:    Contact 1:  Phone number:    Contact 2:  Phone number:    Jehovah's witness/:  Phone number:    Therapist:  Phone number:    Local crisis center:    Phone number:    Other community support:  Phone number:

## 2019-06-26 NOTE — PROGRESS NOTES
Subjective     Deni Simental is a 38 year old female who presents to clinic today for the following health issues:    History of Present Illness        Hypothyroidism:     Since last visit, patient describes the following symptoms::  Fatigue, Hair loss and Weight gain    Weight gain::  11-15 lbs.    She eats 2-3 servings of fruits and vegetables daily.She consumes 2 sweetened beverage(s) daily.  She is taking medications regularly.       Hypothyroidism:     Since last visit, patient describes the following symptoms::  Fatigue, Hair loss and Weight gain    Weight gain::  11-15 lbs.  no change in activity level or eating habits.  Periods are monthly though not consistent   Sleep - a good night is she fall asleep within 20 min, sleeps through unless needs to urinate, falls back to sleep. In bed by 2200, alarm at 0530.     Currently found eye mask to help her sleep well.  Prior to that was getting less than 5 hours sleep per night which then triggered migraine.      Over past month, has felt exhausted despite good sleep and therefore having more migraines.      Had frustrating meeting with Endocrine, was told she should not be on medication at this time though also told not to stop cold turkey. Current on 15 mg tid of armour thyroid.  Very frustrated as she does not have a reason for her symptoms and felt the provider was not very helpful.      Patient Active Problem List   Diagnosis     CARDIOVASCULAR SCREENING; LDL GOAL LESS THAN 160     Migraine with aura and without status migrainosus, not intractable     Hashimoto's thyroiditis     Thyroglobulin antibody positive     Alopecia     Fatigue, unspecified type     Poor sleep hygiene     Thyroid nodule     Past Surgical History:   Procedure Laterality Date     HC TOOTH EXTRACTION W/FORCEP         Social History     Tobacco Use     Smoking status: Former Smoker     Types: Cigarettes     Last attempt to quit: 2011     Years since quittin.7     Smokeless  tobacco: Never Used     Tobacco comment: Lives in smoke free household   Substance Use Topics     Alcohol use: No     Alcohol/week: 0.0 oz     Family History   Problem Relation Age of Onset     Alcohol/Drug Mother          MVA alcohol related     Diabetes Father         hypoglycemia     Hypertension Father      Psoriatic Arthritis Father      Cancer Paternal Grandfather         lung cancer     Hypothyroidism Paternal Grandmother      Graves' disease Sister         Graves, tumor     C.A.D. No family hx of      Depression No family hx of      Gastrointestinal Disease No family hx of      Heart Disease No family hx of      Lipids No family hx of      Neurologic Disorder No family hx of      Respiratory No family hx of      Cerebrovascular Disease No family hx of      Glaucoma No family hx of      Macular Degeneration No family hx of          Current Outpatient Medications   Medication Sig Dispense Refill     rizatriptan (MAXALT) 5 MG tablet TAKE 1-2 TABLETS BY MOUTH AT ONSET OF HEADACHE FOR MIGRAINE. MAY REPEAT IN 2HRS. MAX 6 TAB/24 HRS 18 tablet 1     thyroid (ARMOUR) 15 MG tablet Take 1 tablet (15 mg) by mouth 3 times daily 270 tablet 0     BP Readings from Last 3 Encounters:   19 117/75   06/10/19 119/75   19 111/71    Wt Readings from Last 3 Encounters:   19 82.6 kg (182 lb 3.2 oz)   06/10/19 81.6 kg (179 lb 14.4 oz)   19 78.9 kg (173 lb 14.4 oz)                    Reviewed and updated as needed this visit by Provider         Review of Systems   ROS COMP: Constitutional, HEENT, cardiovascular, pulmonary, gi and gu systems are negative, except as otherwise noted.      Objective    /75   Pulse 69   Temp 98.1  F (36.7  C) (Oral)   Resp 16   Wt 82.6 kg (182 lb 3.2 oz)   LMP 2019   BMI 29.86 kg/m    Body mass index is 29.86 kg/m .  Physical Exam   GENERAL: healthy, alert and no distress  EYES: Eyes grossly normal to inspection, PERRL and conjunctivae and sclerae normal  MS:  "no gross musculoskeletal defects noted, no edema  SKIN: no suspicious lesions or rashes  PSYCH: mentation appears normal, affect normal/bright    Diagnostic Test Results:  Labs reviewed in Epic  Ferritin - 30  Low normal        Assessment & Plan     (R79.0) Low ferritin  (primary encounter diagnosis)  Comment: previously checked  Plan: **Ferritin FUTURE 2mo        Trial of vitron C twice daily and recheck labs in 2 months     (E06.3) Hashimoto's thyroiditis  Comment: know and not hypothyroid  Plan: thyroid (ARMOUR) 15 MG tablet        Continue tid dose, if improved with vitron C, plan to wean       BMI:   Estimated body mass index is 29.86 kg/m  as calculated from the following:    Height as of 6/10/19: 1.664 m (5' 5.5\").    Weight as of this encounter: 82.6 kg (182 lb 3.2 oz).   Weight management plan: Discussed healthy diet and exercise guidelines        Patient Instructions     Start Vitron-C once a day, if tolerating well, increase to twice a day.    Recheck ferritin in 2 months      Return in about 2 months (around 8/26/2019) for Non-fasting Lab Work.    Radha Lay MD  [facility]    "

## 2019-06-26 NOTE — NURSING NOTE
"Chief Complaint   Patient presents with     Thyroid Disease     Health Maintenance     phq-9       Initial /75   Pulse 69   Temp 98.1  F (36.7  C) (Oral)   Resp 16   Wt 82.6 kg (182 lb 3.2 oz)   LMP 06/05/2019   BMI 29.86 kg/m   Estimated body mass index is 29.86 kg/m  as calculated from the following:    Height as of 6/10/19: 1.664 m (5' 5.5\").    Weight as of this encounter: 82.6 kg (182 lb 3.2 oz).  Medication Reconciliation: complete  Ike Forman CMA    "

## 2019-06-26 NOTE — PATIENT INSTRUCTIONS
Start Vitron-C once a day, if tolerating well, increase to twice a day.    Recheck ferritin in 2 months

## 2019-07-10 RX ORDER — THYROID,PORK 15 MG
TABLET ORAL
Qty: 90 TABLET | Refills: 0 | OUTPATIENT
Start: 2019-07-10

## 2019-07-25 ENCOUNTER — MYC REFILL (OUTPATIENT)
Dept: FAMILY MEDICINE | Facility: CLINIC | Age: 39
End: 2019-07-25

## 2019-07-25 DIAGNOSIS — E06.3 HASHIMOTO'S THYROIDITIS: ICD-10-CM

## 2019-07-25 DIAGNOSIS — G43.109 MIGRAINE WITH AURA AND WITHOUT STATUS MIGRAINOSUS, NOT INTRACTABLE: ICD-10-CM

## 2019-07-25 RX ORDER — THYROID 15 MG/1
15 TABLET ORAL 3 TIMES DAILY
Qty: 270 TABLET | Refills: 0 | Status: CANCELLED | OUTPATIENT
Start: 2019-07-25

## 2019-07-25 RX ORDER — RIZATRIPTAN BENZOATE 5 MG/1
TABLET ORAL
Qty: 18 TABLET | Refills: 0 | Status: SHIPPED | OUTPATIENT
Start: 2019-07-25 | End: 2019-09-14

## 2019-08-23 DIAGNOSIS — R79.0 LOW FERRITIN: ICD-10-CM

## 2019-08-23 DIAGNOSIS — E06.3 HASHIMOTO'S THYROIDITIS: ICD-10-CM

## 2019-08-23 LAB
FERRITIN SERPL-MCNC: 31 NG/ML (ref 12–150)
TSH SERPL DL<=0.005 MIU/L-ACNC: 0.92 MU/L (ref 0.4–4)

## 2019-08-23 PROCEDURE — 84443 ASSAY THYROID STIM HORMONE: CPT | Performed by: FAMILY MEDICINE

## 2019-08-23 PROCEDURE — 36415 COLL VENOUS BLD VENIPUNCTURE: CPT | Performed by: FAMILY MEDICINE

## 2019-08-23 PROCEDURE — 82728 ASSAY OF FERRITIN: CPT | Performed by: FAMILY MEDICINE

## 2019-08-24 ENCOUNTER — MYC MEDICAL ADVICE (OUTPATIENT)
Dept: FAMILY MEDICINE | Facility: CLINIC | Age: 39
End: 2019-08-24

## 2019-08-26 NOTE — TELEPHONE ENCOUNTER
Patient is responding to mychart lab result note:  [facility],   Your ferritin is about the same level - how are you feeling?     Your TSH is trending lower and would indicate that we may need to decrease your thyroid medication.   Please let me know how you are doing and if we should make a change.   Have a great weekend,   Radha Lay MD       See patient's response below    [place]ia CUELLARN, RN, CPN

## 2019-09-14 DIAGNOSIS — G43.109 MIGRAINE WITH AURA AND WITHOUT STATUS MIGRAINOSUS, NOT INTRACTABLE: ICD-10-CM

## 2019-09-16 RX ORDER — RIZATRIPTAN BENZOATE 5 MG/1
TABLET ORAL
Qty: 18 TABLET | Refills: 0 | Status: SHIPPED | OUTPATIENT
Start: 2019-09-16 | End: 2019-11-12

## 2019-10-24 DIAGNOSIS — E06.3 HASHIMOTO'S THYROIDITIS: ICD-10-CM

## 2019-10-24 RX ORDER — THYROID,PORK 15 MG
TABLET ORAL
Qty: 270 TABLET | Refills: 1 | Status: SHIPPED | OUTPATIENT
Start: 2019-10-24 | End: 2020-02-17

## 2019-11-07 ENCOUNTER — THERAPY VISIT (OUTPATIENT)
Dept: CHIROPRACTIC MEDICINE | Facility: CLINIC | Age: 39
End: 2019-11-07
Payer: COMMERCIAL

## 2019-11-07 DIAGNOSIS — M99.02 THORACIC SEGMENT DYSFUNCTION: ICD-10-CM

## 2019-11-07 DIAGNOSIS — R51.9 HEAD PAIN: ICD-10-CM

## 2019-11-07 DIAGNOSIS — M99.01 CERVICAL SEGMENT DYSFUNCTION: Primary | ICD-10-CM

## 2019-11-07 DIAGNOSIS — M62.838 SPASM OF MUSCLE: ICD-10-CM

## 2019-11-07 PROCEDURE — 99203 OFFICE O/P NEW LOW 30 MIN: CPT | Mod: 25 | Performed by: CHIROPRACTOR

## 2019-11-07 PROCEDURE — 98940 CHIROPRACT MANJ 1-2 REGIONS: CPT | Mod: AT | Performed by: CHIROPRACTOR

## 2019-11-07 PROCEDURE — 97110 THERAPEUTIC EXERCISES: CPT | Performed by: CHIROPRACTOR

## 2019-11-07 NOTE — PROGRESS NOTES
Chiropractic Clinic Visit    PCP: Radha Lay    Deni Simental is a 39 year old female who is seen  as a self referral presenting with headaches . Patient reports that the onset was many years ago.  In the past Deni would have about 2-3 migraines per year and lately she has been getting 2-3 a month.  She would alos get about 1-2 tension headaches per week but lately for the past 3-4 weeks she has been having these headaches daily.  She rates the pain at a 9 out of 10 and the pain islocated in her frontal sinus region.  Her neck and upper traps are very stiff, tight and sore.  Her sleep has also been disrupted from the pain.    Injury: none    Location of Pain: head   Duration of Pain: 3-4 week(s)  Rating of Pain at worst: 10/10  Rating of Pain Currently: 9/10  Symptoms are better with: ice and being in a cool dark roomr  Symptoms are worse with: flashing lights, loud sounds, stress and exhaustion  Additional Features:        Health History  as reported by the patient:    How does the patient rate their own health:   Good    Current or past medical history:   Migraines/headaches, Overweight and Thyroid problems    Medical allergies  None    Past Traumas/Surgeries  Other:  Dixon teeth    Family History  The family history includes Alcohol/Drug in her mother; Cancer in her paternal grandfather; Diabetes in her father; Graves' disease in her sister; Hypertension in her father; Hypothyroidism in her paternal grandmother; Psoriatic Arthritis in her father.    Medications:  Thyroid    Occupation:      Primary job tasks:   Driving, Prolonged sitting and Repetitive tasks    Barriers as home/work:   none    Additional health Issues:           Review of Systems  Musculoskeletal: as above  Remainder of review of systems is negative including constitutional, CV, pulmonary, GI, Skin and Neurologic except as noted in HPI or medical history.    Past Medical History:   Diagnosis Date     Allergy      multiple     Ankle sprain 4/10     Arthritis      Depression     in teens -      Hashimoto's thyroiditis      IUD (intrauterine device) in place     mirena 6/20/05     Past Surgical History:   Procedure Laterality Date     HC TOOTH EXTRACTION W/FORCEP         Objective  There were no vitals taken for this visit.    GENERAL APPEARANCE: healthy, alert and no distress   GAIT: NORMAL  SKIN: no suspicious lesions or rashes  NEURO: Normal strength and tone, mentation intact and speech normal  PSYCH:  mentation appears normal and affect normal/bright    [facility] was asked to complete the Neck Disability Index, today in the office. NDI Disability score: 23%; pain severity scale: 9/10..    Cervical Spine Exam    Range of Motion:         forward flexion mild limitation with some pulling pain         extension full with pain        lateral rotation mild limitation with some pulling        lateral flexion mild limitation with some pulling pain    Inspection:         No visible deformity  Anterior head carriage with rounded shoulders    Tender:        upper border of trapezius    Non-Tender:        remainder of cervical spine area    Muscle strength:       C4 (shoulder shrug)  symmetric 5/5 Normal       C5 (shoulder abduction) symmetric 5/5 Normal       C6 (elbow flexion) symmetric 5/5 Normal       C7 (elbow extension) symmetric 5/5 Normal       C8 (finger abduction, thumb flexion) symmetric 5/5 Normal    Reflexes:       Sensation:       grossly intact througout bilateral upper extremities    Special Tests:       neg (-) Spurling      Lymphatics:        no edema noted in the upper extremities       Segmental spinal dysfunction/restrictions found at:  C1 , C6 , C7 , T1  and T2       The following soft tissue hypotonicities were observed:Traps: ache, dull pain and stiff, referred pain: no    Trigger points were found in:Traps    Muscle spasm found in:Traps      Radiology:      Assessment:    1. Cervical segment dysfunction    2.  Head pain    3. Thoracic segment dysfunction    4. Spasm of muscle        RX ordered/plan of care  Anticipated outcomes  Possible risks and side effects    After discussing the risk and benefits of care, patient consented to treatment    Patient's condition:  Patient had restrictions pre-manipulation    Treatment effectiveness:  Post manipulation there is better intersegmental movement and Patient claims to feel looser post manipulation    Plan:    Procedures:    Evaluation and Management:  92639 Moderate level exam 30 min    CMT:  52558 Chiropractic manipulative treatment 1-2 regions performed   Cervical: Activator, C1 , C6, C7 , Prone  Thoracic: Activator, T1, T2, Prone    Modalities:  11227: MSTM:  To Traps  for 5 min    Therapeutic procedures:  82236: Therapeutic Exercises  Direct one-on-one treatment to develop flexibilty, range of motion, strength and endurance.   The following were demonstrated and practiced:   Stretches - Reviewed stretches today.  Scapular retraction  Cervical retraction  pect minor corner stretch  Total time: 12    Response to Treatment  Reduction in symptoms as reported by patient    Prognosis: Good      Treatment plan and goals:  Goals:  SLEEPING: the patient specific goal is to attain his pre-injury status of 5 hours comfortably    Frequency of care  Duration of care is estimated to be 6 weeks, from the initial treatment.  It is estimated that the patient will need a total of 6 visits to resolve this episode.  For the initial therapeutic trial of care, the frequency is recommended at 1 X week, once daily.  A reevaluation would be clinically appropriate in 6 visits, to determine progress and further course of care.    In-Office Treatment  Evaluation  Spinal Chiropractic Manipulative Therapy:    Modalities: MSTM  Therapeutic exercises:  Stretches      Recommendations:    Instructions:  ice 20 minutes every other hour as needed    Follow-up:  Return to care in one week.     Disclaimer:  This note consists of symbols derived from keyboarding, dictation and/or voice recognition software. As a result, there may be errors in the script that have gone undetected. Please consider this when interpreting information found in this chart.

## 2019-11-08 ENCOUNTER — HEALTH MAINTENANCE LETTER (OUTPATIENT)
Age: 39
End: 2019-11-08

## 2019-11-11 ENCOUNTER — THERAPY VISIT (OUTPATIENT)
Dept: CHIROPRACTIC MEDICINE | Facility: CLINIC | Age: 39
End: 2019-11-11
Payer: COMMERCIAL

## 2019-11-11 DIAGNOSIS — M99.02 THORACIC SEGMENT DYSFUNCTION: ICD-10-CM

## 2019-11-11 DIAGNOSIS — M62.838 SPASM OF MUSCLE: ICD-10-CM

## 2019-11-11 DIAGNOSIS — M99.01 CERVICAL SEGMENT DYSFUNCTION: Primary | ICD-10-CM

## 2019-11-11 DIAGNOSIS — R51.9 HEAD ACHE: ICD-10-CM

## 2019-11-11 PROCEDURE — 98940 CHIROPRACT MANJ 1-2 REGIONS: CPT | Mod: AT | Performed by: CHIROPRACTOR

## 2019-11-11 NOTE — PROGRESS NOTES
Visit #:  2    Subjective:  Deni Simental is a 39 year old female who is seen in f/u up for:        Cervical segment dysfunction  Head ache  Thoracic segment dysfunction  Spasm of muscle.     Since last visit on 11/7/2019,  Deni Simental reports:    Area of chief complaint:  Cervical :  Symptoms are graded at 5/10. The quality is described as stiff, achey.  Motion has increased, but is still not normal. Patient feels that they are improved due to a reduction in symptoms. Deni reports that the intensity of her headachs has reduced and she woke up this morning without a headache, which is an improvement       Objective:  The following was observed:    P: palpatory tenderness Traps   A: static palpation demonstrates intersegmental asymmetry , cervical, thoracic  R: motion palpation notes restricted motion, C1 , C6 , C7 , T1  and T2   T: hypertonicity at: Traps     Segmental spinal dysfunction/restrictions found at:  C1 , C6 , C7 , T1  and T2       Assessment:    Diagnoses:      1. Cervical segment dysfunction    2. Head ache    3. Thoracic segment dysfunction    4. Spasm of muscle        Patient's condition:  Patient had restrictions pre-manipulation    Treatment effectiveness:  Post manipulation there is better intersegmental movement and Patient claims to feel looser post manipulation      Procedures:  CMT:  53131 Chiropractic manipulative treatment 1-2 regions performed   Cervical: Activator, C1 , C6, C7 , Prone  Thoracic: Activator, T1, T2, Prone    Modalities:  76812: MSTM:  To Traps  for 5 min    Therapeutic procedures:  None    Response to Treatment  Reduction in symptoms as reported by patient    Prognosis: Good    Progress towards Goals: Patient is making progress towards the goal.     Recommendations:    Instructions:  stretch as instructed at visit    Follow-up:    Return to care in one week.

## 2019-11-12 DIAGNOSIS — G43.109 MIGRAINE WITH AURA AND WITHOUT STATUS MIGRAINOSUS, NOT INTRACTABLE: ICD-10-CM

## 2019-11-12 RX ORDER — RIZATRIPTAN BENZOATE 5 MG/1
TABLET ORAL
Qty: 18 TABLET | Refills: 0 | Status: SHIPPED | OUTPATIENT
Start: 2019-11-12 | End: 2019-12-23

## 2019-11-12 NOTE — TELEPHONE ENCOUNTER
Prescription approved per [facility] Refill Protocol.  APPT NEEDED FOR FURTHER REFILLS  Please help the pt schedule an appointment physical, migraines.    Health Maintenance Due   Topic Date Due     INFLUENZA VACCINE (1) 09/01/2019     PREVENTIVE CARE VISIT  10/01/2019     Lidya Moralez RN

## 2019-11-12 NOTE — LETTER
November 13, 2019    Deni Simental  [address] [address] [facility] [id number]    Dear Deni,       We recently received a refill request for Rizatriptan (MAXALT).  We have refilled this for a one time 30 day supply only because you are due for a:    Physical/ Migraines office visit      Please call at your earliest convenience so that there will not be a delay with your future refills.          Thank you,   Your [facility] Team/mkr  [phone]

## 2019-11-25 ENCOUNTER — THERAPY VISIT (OUTPATIENT)
Dept: CHIROPRACTIC MEDICINE | Facility: CLINIC | Age: 39
End: 2019-11-25
Payer: COMMERCIAL

## 2019-11-25 DIAGNOSIS — M99.02 THORACIC SEGMENT DYSFUNCTION: ICD-10-CM

## 2019-11-25 DIAGNOSIS — R51.9 HEAD ACHE: ICD-10-CM

## 2019-11-25 DIAGNOSIS — M62.838 SPASM OF MUSCLE: ICD-10-CM

## 2019-11-25 DIAGNOSIS — M99.01 CERVICAL SEGMENT DYSFUNCTION: Primary | ICD-10-CM

## 2019-11-25 PROCEDURE — 98940 CHIROPRACT MANJ 1-2 REGIONS: CPT | Mod: AT | Performed by: CHIROPRACTOR

## 2019-11-25 NOTE — PROGRESS NOTES
Visit #:  3    Subjective:  Deni Simental is a 39 year old female who is seen in f/u up for:        Cervical segment dysfunction  Head ache  Thoracic segment dysfunction  Spasm of muscle.     Since last visit on 11/11/2019,  Deni Simental reports:    Area of chief complaint:  Cervical :  Symptoms are graded at 1/10. The quality is described as stiff.  Motion has increased, but is still not normal. Patient feels that they are improved due to a reduction in symptoms. Deni reports that she has not had a headache since last visit.  Overall she is feeling improved.       Objective:  The following was observed:    P: palpatory tenderness Traps   A: static palpation demonstrates intersegmental asymmetry , cervical, thoracic  R: motion palpation notes restricted motion, C1 , C6 , C7 , T1  and T2   T: hypertonicity at: Traps     Segmental spinal dysfunction/restrictions found at:  C1 , C6 , C7 , T1  and T2       Assessment:    Diagnoses:      1. Cervical segment dysfunction    2. Head ache    3. Thoracic segment dysfunction    4. Spasm of muscle        Patient's condition:  Patient had restrictions pre-manipulation    Treatment effectiveness:  Post manipulation there is better intersegmental movement and Patient claims to feel looser post manipulation      Procedures:  CMT:  67987 Chiropractic manipulative treatment 1-2 regions performed   Cervical: Activator, C1 , C6, C7 , Prone  Thoracic: Activator, T1, T2, Prone    Modalities:  39801: MSTM:  To Traps  for 5 min    Therapeutic procedures:  None    Response to Treatment  Reduction in symptoms as reported by patient    Prognosis: Good    Progress towards Goals: Patient is making progress towards the goal.     Recommendations:    Instructions:  stretch as instructed at visit    Follow-up:    Return to care if symptoms persist

## 2019-12-03 ENCOUNTER — OFFICE VISIT (OUTPATIENT)
Dept: FAMILY MEDICINE | Facility: OTHER | Age: 39
End: 2019-12-03
Payer: COMMERCIAL

## 2019-12-03 VITALS
BODY MASS INDEX: 32.47 KG/M2 | HEART RATE: 76 BPM | HEIGHT: 66 IN | SYSTOLIC BLOOD PRESSURE: 120 MMHG | WEIGHT: 202 LBS | TEMPERATURE: 97.7 F | DIASTOLIC BLOOD PRESSURE: 80 MMHG | RESPIRATION RATE: 14 BRPM | OXYGEN SATURATION: 98 %

## 2019-12-03 DIAGNOSIS — R21 ERYTHEMATOUS RASH: Primary | ICD-10-CM

## 2019-12-03 PROCEDURE — 87186 SC STD MICRODIL/AGAR DIL: CPT | Performed by: PHYSICIAN ASSISTANT

## 2019-12-03 PROCEDURE — 87077 CULTURE AEROBIC IDENTIFY: CPT | Performed by: PHYSICIAN ASSISTANT

## 2019-12-03 PROCEDURE — 87070 CULTURE OTHR SPECIMN AEROBIC: CPT | Performed by: PHYSICIAN ASSISTANT

## 2019-12-03 PROCEDURE — 99213 OFFICE O/P EST LOW 20 MIN: CPT | Performed by: PHYSICIAN ASSISTANT

## 2019-12-03 RX ORDER — AMOXICILLIN 875 MG
875 TABLET ORAL 2 TIMES DAILY
Qty: 14 TABLET | Refills: 0 | Status: SHIPPED | OUTPATIENT
Start: 2019-12-03 | End: 2019-12-10

## 2019-12-03 RX ORDER — TRIAMCINOLONE ACETONIDE 1 MG/G
OINTMENT TOPICAL 2 TIMES DAILY
Qty: 80 G | Refills: 0 | Status: SHIPPED | OUTPATIENT
Start: 2019-12-03 | End: 2020-02-17

## 2019-12-03 ASSESSMENT — MIFFLIN-ST. JEOR: SCORE: 1623.4

## 2019-12-03 NOTE — PATIENT INSTRUCTIONS
- Start Amoxicillin, twice per day until gone  - Start triamcinolone ointment apply a thin layer twice per day for max 14 days.   - Use topical emollient 2-3 times per day.   - Benadryl if needed.     Follow-up if not improving in the next week, sooner if worse or new concerns.

## 2019-12-03 NOTE — PROGRESS NOTES
Subjective     Deni Simental is a 39 year old female who presents to clinic today for the following health issues:    HPI   Rash  Onset: about 2 months.     Description:   Location: started in left armpit, then moved to in between and underneath her breasts and then moved to right armpit and arms and now on left arm and leg.   Character: round, burning, draining, red, open sores  Itching (Pruritis): YES    Progression of Symptoms:  Worsening, it has now spread. Depends on where on the body.     Accompanying Signs & Symptoms:  Fever: no   Body aches or joint pain: no   Sore throat symptoms: no   Recent cold symptoms: no     History:   Previous similar rash: YES, she's had hives and shingles but this is nothing like that.     Precipitating factors:   Exposure to similar rash: no   New exposures: None   Recent travel: no     Alleviating factors:  Not itching it.     Therapies Tried and outcome: benadryl, itch cream, she does put socks on her hands at night.        - it is improving in the arm pit and under the breast regions now, previously worse.  Now more on the arms R>L.   - No new exposures, new skin products, detergents, new foods, recent travel.   - no recent illnesses. No diarrhea, sore throat, fevers, chills.   - Father has Psoriasis she isn't sure if this could be it  - Started after she was shaving and thought it was just razor burn initially.     Current Outpatient Medications   Medication Sig Dispense Refill     amoxicillin (AMOXIL) 875 MG tablet Take 1 tablet (875 mg) by mouth 2 times daily for 7 days 14 tablet 0     ARMOUR THYROID 15 MG tablet TAKE 1 TABLET BY MOUTH 3 TIMES DAILY 270 tablet 1     rizatriptan (MAXALT) 5 MG tablet TAKE 1-2 TABLETS BY MOUTH AT ONSET OF HEADACHE FOR MIGRAINE. MAY REPEAT IN 2HRS. MAX 6 TAB/24 HRS 18 tablet 0     triamcinolone (KENALOG) 0.1 % external ointment Apply topically 2 times daily 80 g 0     Allergies   Allergen Reactions     Wasp Venom Hives     Swelling past  "two joints     Higinio Flavor Hives     Red Dye Hives     Retin-A [Tretinoin] Hives       Reviewed and updated as needed this visit by Provider  Tobacco  Allergies  Meds  Problems  Med Hx  Surg Hx  Fam Hx         Review of Systems   ROS COMP: Constitutional, HEENT, cardiovascular, pulmonary, gi and gu, msk, skin systems are negative, except as otherwise noted.      Objective    /80   Pulse 76   Temp 97.7  F (36.5  C) (Temporal)   Resp 14   Ht 1.685 m (5' 6.34\")   Wt 91.6 kg (202 lb)   LMP 11/30/2019 (Exact Date)   SpO2 98%   BMI 32.27 kg/m    Body mass index is 32.27 kg/m .  Physical Exam   GENERAL: healthy, alert and no distress  MS: no gross musculoskeletal defects noted, no edema  SKIN: Right upper extremity - From the wrist to the shoulder there are discrete erythematous papules multiple with scab formation largest are 3 mm in diameter.  Left upper extremity - isolated to the upper arm there are discrete scattered erythematous papules 3 mm in diameter at largest. Left axilla - there are a few scattered erythematous maculopapular lesions largest 4 mm.  Under Breasts - there are a few scattered erythematous papules with scab formation present.  PSYCH: mentation appears normal, affect normal/bright    Diagnostic Test Results:  Labs reviewed in Epic        Assessment & Plan       ICD-10-CM    1. Erythematous rash R21 amoxicillin (AMOXIL) 875 MG tablet     triamcinolone (KENALOG) 0.1 % external ointment     Wound Culture Aerobic Bacterial     The rash has clearly extended but the initial areas that were involved appear to be resolving.  Concern for infection based on examination and erythematous papules, doesn't appear to be isolated to areas of follicles.  Did obtain culture.  Will start her on amoxicillin (has previously tolerated, cephalexin has red dye which she is allergic to but she has never been on Cephalosporin in the past).  Given topical triamcinolone to use for itching.  Discussed risks " of both medications.  Encouraged emollients 2-3 times per day as well.  Can continue Benadryl for now.     Return in about 1 week (around 12/10/2019) for If not improving, sooner if worse or new concerns.     Options for treatment and follow-up care were reviewed with the patient and/or guardian. Patient and/or guardian engaged in the decision making process and verbalized understanding of the options discussed and agreed with the final plan.     Yoana Ellsworth PA-C  [facility]

## 2019-12-06 LAB
BACTERIA SPEC CULT: ABNORMAL
Lab: ABNORMAL
SPECIMEN SOURCE: ABNORMAL

## 2019-12-18 NOTE — PROGRESS NOTES
"Subjective     Deni Simental is a 39 year old female who presents to clinic today for the following health issues:    History of Present Illness        Migraines:   Since the patient's last clinic visit, headaches are: no change  The patient is getting headaches:  5-6 times per month  She is not able to do normal daily activities when she has a migraine.  The patient is taking the following rescue/relief medications:  Ibuprofen (Advil, Motrin), Naproxyn (Aleve) and Maxalt   Patient states \"The relief is inconsistent\" from the rescue/relief medications.   The patient is taking the following medications to prevent migraines:  No medications to prevent migraines  In the past 4 weeks, the patient has gone to an Urgent Care or Emergency Room 0 times times due to headaches.    She eats 2-3 servings of fruits and vegetables daily.She consumes 0 sweetened beverage(s) daily.  She is taking medications regularly.     Migraine     Since your last clinic visit, how have your headaches changed?  Improved    How often are you getting headaches or migraines? One a week headaches 1 02 a month for the migraines.     Are you able to do normal daily activities when you have a migraine? No    Are you taking rescue/relief medications? (Select all that apply) ibuprofen (Advil, Motrin)    How helpful is your rescue/relief medication?  The relief is inconsistent    Are you taking any medications to prevent migraines? (Select all that apply)  No    In the past 4 weeks, how often have you gone to urgent care or the emergency room because of your headaches?  0          -------------------------------------    Patient Active Problem List   Diagnosis     CARDIOVASCULAR SCREENING; LDL GOAL LESS THAN 160     Migraine with aura and without status migrainosus, not intractable     Hashimoto's thyroiditis     Thyroglobulin antibody positive     Alopecia     Fatigue, unspecified type     Poor sleep hygiene     Thyroid nodule     Past Surgical " History:   Procedure Laterality Date     HC TOOTH EXTRACTION W/FORCEP         Social History     Tobacco Use     Smoking status: Former Smoker     Types: Cigarettes     Last attempt to quit: 2011     Years since quittin.2     Smokeless tobacco: Never Used     Tobacco comment: Lives in smoke free household   Substance Use Topics     Alcohol use: No     Alcohol/week: 0.0 standard drinks     Family History   Problem Relation Age of Onset     Alcohol/Drug Mother          MVA alcohol related     Diabetes Father         hypoglycemia     Hypertension Father      Psoriatic Arthritis Father      Cancer Paternal Grandfather         lung cancer     Hypothyroidism Paternal Grandmother      Graves' disease Sister         Graves, tumor     C.A.D. No family hx of      Depression No family hx of      Gastrointestinal Disease No family hx of      Heart Disease No family hx of      Lipids No family hx of      Neurologic Disorder No family hx of      Respiratory No family hx of      Cerebrovascular Disease No family hx of      Glaucoma No family hx of      Macular Degeneration No family hx of          Current Outpatient Medications   Medication Sig Dispense Refill     ARMOUR THYROID 15 MG tablet TAKE 1 TABLET BY MOUTH 3 TIMES DAILY 270 tablet 1     rizatriptan (MAXALT) 5 MG tablet Take 1 tablet (5 mg) by mouth at onset of headache for migraine 18 tablet 0     triamcinolone (KENALOG) 0.1 % external ointment Apply topically 2 times daily (Patient not taking: Reported on 2019) 80 g 0     Allergies   Allergen Reactions     Wasp Venom Hives     Swelling past two joints     Higinio Flavor Hives     Red Dye Hives     Retin-A [Tretinoin] Hives     Recent Labs   Lab Test 19  0849 06/10/19  1816  19  1106 10/01/18  0957  13  0952 13  1153   LDL  --   --   --   --  94  --   --   --    HDL  --   --   --   --  46*  --   --   --    TRIG  --   --   --   --  71  --   --   --    ALT  --   --   --  20  --   --   24 26   CR  --   --   --  0.72  --   --  0.57  --    GFRESTIMATED  --   --   --  >90  --   --  >90  --    GFRESTBLACK  --   --   --  >90  --   --  >90  --    POTASSIUM  --   --   --  4.1  --   --  4.1  --    TSH 0.92 2.25   < > 3.70  --    < >  --   --     < > = values in this interval not displayed.      BP Readings from Last 3 Encounters:   12/23/19 122/76   12/03/19 120/80   06/26/19 117/75    Wt Readings from Last 3 Encounters:   12/23/19 90.7 kg (200 lb)   12/03/19 91.6 kg (202 lb)   06/26/19 82.6 kg (182 lb 3.2 oz)                    Reviewed and updated as needed this visit by Provider         Review of Systems   ROS COMP: Constitutional, HEENT, cardiovascular, pulmonary, GI, , musculoskeletal, neuro, skin, endocrine and psych systems are negative, except as otherwise noted.      Objective    LMP 11/30/2019 (Exact Date)   There is no height or weight on file to calculate BMI.  Physical Exam  Constitutional:       Appearance: Normal appearance.   HENT:      Head: Normocephalic and atraumatic.   Neck:      Musculoskeletal: Normal range of motion and neck supple.   Cardiovascular:      Rate and Rhythm: Normal rate and regular rhythm.      Pulses: Normal pulses.      Heart sounds: Normal heart sounds. No murmur. No friction rub. No gallop.    Pulmonary:      Effort: Pulmonary effort is normal. No respiratory distress.      Breath sounds: Normal breath sounds. No wheezing, rhonchi or rales.   Chest:      Chest wall: No tenderness.   Neurological:      Mental Status: She is alert.   Psychiatric:         Mood and Affect: Mood normal.         Behavior: Behavior normal.         Thought Content: Thought content normal.         Judgment: Judgment normal.            Diagnostic Test Results:  Labs reviewed in Epic        Assessment & Plan   Problem List Items Addressed This Visit     Migraine with aura and without status migrainosus, not intractable     Used to have daily tension headaches to once a week tension  "headaches. Migraines are currently 1-2 times a month.  She works as a mail personnel.   FMLA signed. Refill rizatriptan. She is not interested in controller meds. Recheck in 6 months         Relevant Medications    rizatriptan (MAXALT) 5 MG tablet             BMI:   Estimated body mass index is 32.1 kg/m  as calculated from the following:    Height as of this encounter: 1.681 m (5' 6.18\").    Weight as of this encounter: 90.7 kg (200 lb).           See Patient Instructions  Return in about 6 months (around 6/23/2020).    Seble Maradiaga MD  [facility]    "

## 2019-12-23 ENCOUNTER — OFFICE VISIT (OUTPATIENT)
Dept: FAMILY MEDICINE | Facility: OTHER | Age: 39
End: 2019-12-23
Payer: COMMERCIAL

## 2019-12-23 VITALS
DIASTOLIC BLOOD PRESSURE: 76 MMHG | SYSTOLIC BLOOD PRESSURE: 122 MMHG | WEIGHT: 200 LBS | HEIGHT: 66 IN | HEART RATE: 65 BPM | RESPIRATION RATE: 12 BRPM | TEMPERATURE: 97.9 F | BODY MASS INDEX: 32.14 KG/M2 | OXYGEN SATURATION: 98 %

## 2019-12-23 DIAGNOSIS — G43.109 MIGRAINE WITH AURA AND WITHOUT STATUS MIGRAINOSUS, NOT INTRACTABLE: ICD-10-CM

## 2019-12-23 PROCEDURE — 99213 OFFICE O/P EST LOW 20 MIN: CPT | Performed by: FAMILY MEDICINE

## 2019-12-23 RX ORDER — RIZATRIPTAN BENZOATE 5 MG/1
5 TABLET ORAL
Qty: 18 TABLET | Refills: 0 | Status: SHIPPED | OUTPATIENT
Start: 2019-12-23 | End: 2020-07-16

## 2019-12-23 ASSESSMENT — MIFFLIN-ST. JEOR: SCORE: 1611.81

## 2019-12-23 NOTE — ASSESSMENT & PLAN NOTE
Used to have daily tension headaches to once a week tension headaches. Migraines are currently 1-2 times a month.  She works as a mail personnel.   [place] signed. Refill rizatriptan. She is not interested in controller meds. Recheck in 6 months

## 2020-02-12 ENCOUNTER — OFFICE VISIT (OUTPATIENT)
Dept: URGENT CARE | Facility: RETAIL CLINIC | Age: 40
End: 2020-02-12
Payer: COMMERCIAL

## 2020-02-12 VITALS
DIASTOLIC BLOOD PRESSURE: 77 MMHG | OXYGEN SATURATION: 97 % | HEART RATE: 68 BPM | TEMPERATURE: 98 F | SYSTOLIC BLOOD PRESSURE: 117 MMHG

## 2020-02-12 DIAGNOSIS — R53.83 MALAISE AND FATIGUE: ICD-10-CM

## 2020-02-12 DIAGNOSIS — J06.9 VIRAL UPPER RESPIRATORY INFECTION: Primary | ICD-10-CM

## 2020-02-12 DIAGNOSIS — R53.81 MALAISE AND FATIGUE: ICD-10-CM

## 2020-02-12 DIAGNOSIS — R05.9 COUGH: ICD-10-CM

## 2020-02-12 LAB
FLUAV AG UPPER RESP QL IA.RAPID: NEGATIVE
FLUBV AG UPPER RESP QL IA.RAPID: NEGATIVE

## 2020-02-12 PROCEDURE — 87804 INFLUENZA ASSAY W/OPTIC: CPT | Mod: QW | Performed by: PHYSICIAN ASSISTANT

## 2020-02-12 PROCEDURE — 99213 OFFICE O/P EST LOW 20 MIN: CPT | Performed by: PHYSICIAN ASSISTANT

## 2020-02-12 NOTE — PROGRESS NOTES
Chief Complaint   Patient presents with     Pharyngitis     since this morning, no fevers     Cough     x 2 days, nasal congestion x 2 days, sometimes productive      SUBJECTIVE:   Deni Simental is a 39 year old female presenting with a chief complaint of cough x 2 days, mild sore throat, nasal congestion, headache  Onset of symptoms was 2 day(s) ago.  Course of illness is same.    Severity mild  Current and Associated symptoms: cough, sore throat, nasal congestion  Treatment measures tried include Fluids.  Predisposing factors include 1 kids- ill contacts    Past Medical History:   Diagnosis Date     Allergy     multiple     Ankle sprain 4/10     Arthritis      Depression     in teens -      Hashimoto's thyroiditis      IUD (intrauterine device) in place     mirena 05     Current Outpatient Medications   Medication Sig Dispense Refill     ARMOUR THYROID 15 MG tablet TAKE 1 TABLET BY MOUTH 3 TIMES DAILY 270 tablet 1     rizatriptan (MAXALT) 5 MG tablet Take 1 tablet (5 mg) by mouth at onset of headache for migraine 18 tablet 0     triamcinolone (KENALOG) 0.1 % external ointment Apply topically 2 times daily (Patient not taking: Reported on 2020) 80 g 0     Social History     Tobacco Use     Smoking status: Former Smoker     Types: Cigarettes     Last attempt to quit: 2011     Years since quittin.4     Smokeless tobacco: Never Used     Tobacco comment: Lives in smoke free household   Substance Use Topics     Alcohol use: No     Alcohol/week: 0.0 standard drinks       ROS:  CONSTITUTIONAL:POSITIVE  for headache and body aches NEGATIVE  for fever   ENT/MOUTH: POSITIVE for sore throat and NEGATIVE for ear pain  RESP:POSITIVE for cough and NEGATIVE for wheezing    OBJECTIVE:  /77 (BP Location: Left arm)   Pulse 68   Temp 98  F (36.7  C) (Oral)   SpO2 97%   GENERAL APPEARANCE: healthy, alert and no distress  EYES:conjunctiva clear  HENT: ear canals and TM's normal.  Nose and mouth  without ulcers, erythema or lesions  NECK: supple, nontender, no lymphadenopathy  RESP: lungs clear to auscultation - no rales, rhonchi or wheezes  CV: regular rates and rhythm, normal S1 S2, no murmur noted  SKIN: no suspicious lesions or rashes    Rapid influenza A negative, influenza B negative    ASSESSMENT:  (J06.9) Viral upper respiratory infection  (primary encounter diagnosis)  (R05) Cough  (R53.81,  R53.83) Malaise and fatigue    PLAN:  Patient Instructions   Viral chest colds can last for several weeks  No indication for antibiotics discussed.    Rest! Your body needs more rest to heal.  Drink plenty of fluids (warm fluids like tea or soup are soothing and reduce cough)  Sit in the bathroom with a hot shower running and breathe in the steam.  Honey may soothe your sore throat and help manage your cough- may take straight or in warm water with lemon juice.  Avoid smoke from cigarettes, fireplace, bonfire etc.  Take Tylenol or an NSAID such as ibuprofen (Advil) or naproxen (Aleve) as needed for pain.  Good handwashing is the best way to prevent spread of the common cold.  Present to emergency room if you develop trouble breathing, swallowing or cough-up blood.  Follow up with your primary care provider if symptoms worsen or fail to improve as expected\     Georgia Yousif PA-C  [facility]

## 2020-02-12 NOTE — LETTER
[facility]  [address] [address]  Phone:[phone]         2020    Deni Simental  [address] [address] [phone] (home) [phone] (work)    :     1980      To Whom it May Concern:    Deni's son Ganesh was evaluated in our clinic today for acute illness. Please excuse her from work missed 2020 and 2020 to care for her son during his illness.     Please contact me for questions or concerns.    Sincerely,        Georgia Yousif PA-C

## 2020-02-12 NOTE — PATIENT INSTRUCTIONS
Viral chest colds can last for several weeks  No indication for antibiotics discussed.    Rest! Your body needs more rest to heal.  Drink plenty of fluids (warm fluids like tea or soup are soothing and reduce cough)  Sit in the bathroom with a hot shower running and breathe in the steam.  Honey may soothe your sore throat and help manage your cough- may take straight or in warm water with lemon juice.  Avoid smoke from cigarettes, fireplace, bonfire etc.  Take Tylenol or an NSAID such as ibuprofen (Advil) or naproxen (Aleve) as needed for pain.  Good handwashing is the best way to prevent spread of the common cold.  Present to emergency room if you develop trouble breathing, swallowing or cough-up blood.  Follow up with your primary care provider if symptoms worsen or fail to improve as expected\
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
## 2020-02-17 ENCOUNTER — OFFICE VISIT (OUTPATIENT)
Dept: FAMILY MEDICINE | Facility: OTHER | Age: 40
End: 2020-02-17
Payer: COMMERCIAL

## 2020-02-17 VITALS
HEART RATE: 79 BPM | HEIGHT: 66 IN | BODY MASS INDEX: 32.3 KG/M2 | OXYGEN SATURATION: 99 % | SYSTOLIC BLOOD PRESSURE: 96 MMHG | WEIGHT: 201 LBS | DIASTOLIC BLOOD PRESSURE: 74 MMHG | TEMPERATURE: 98.2 F | RESPIRATION RATE: 16 BRPM

## 2020-02-17 DIAGNOSIS — E04.1 THYROID NODULE: ICD-10-CM

## 2020-02-17 DIAGNOSIS — E06.3 HASHIMOTO'S THYROIDITIS: ICD-10-CM

## 2020-02-17 DIAGNOSIS — Z00.00 ROUTINE GENERAL MEDICAL EXAMINATION AT A HEALTH CARE FACILITY: Primary | ICD-10-CM

## 2020-02-17 DIAGNOSIS — G43.109 MIGRAINE WITH AURA AND WITHOUT STATUS MIGRAINOSUS, NOT INTRACTABLE: ICD-10-CM

## 2020-02-17 LAB — TSH SERPL DL<=0.005 MIU/L-ACNC: 0.89 MU/L (ref 0.4–4)

## 2020-02-17 PROCEDURE — 84443 ASSAY THYROID STIM HORMONE: CPT | Performed by: FAMILY MEDICINE

## 2020-02-17 PROCEDURE — 36415 COLL VENOUS BLD VENIPUNCTURE: CPT | Performed by: FAMILY MEDICINE

## 2020-02-17 PROCEDURE — 99395 PREV VISIT EST AGE 18-39: CPT | Performed by: FAMILY MEDICINE

## 2020-02-17 RX ORDER — THYROID 15 MG/1
15 TABLET ORAL 2 TIMES DAILY
Qty: 180 TABLET | Refills: 1 | Status: SHIPPED | OUTPATIENT
Start: 2020-02-17 | End: 2020-05-04

## 2020-02-17 ASSESSMENT — ENCOUNTER SYMPTOMS
EYE PAIN: 0
ARTHRALGIAS: 1
FEVER: 0
MYALGIAS: 1
PARESTHESIAS: 0
COUGH: 1
CONSTIPATION: 1
JOINT SWELLING: 0
HEMATURIA: 0
DIARRHEA: 0
PALPITATIONS: 0
ABDOMINAL PAIN: 0
NAUSEA: 0
CHILLS: 0
FREQUENCY: 1
BREAST MASS: 0
HEADACHES: 1
NERVOUS/ANXIOUS: 0
HEARTBURN: 0
DIZZINESS: 0
HEMATOCHEZIA: 0
SORE THROAT: 0
DYSURIA: 0
WEAKNESS: 0
SHORTNESS OF BREATH: 0

## 2020-02-17 ASSESSMENT — PAIN SCALES - GENERAL: PAINLEVEL: NO PAIN (0)

## 2020-02-17 ASSESSMENT — MIFFLIN-ST. JEOR: SCORE: 1605.54

## 2020-02-17 NOTE — RESULT ENCOUNTER NOTE
[facility], your TSH has continued to drop, we need to decrease thyroid dosing. So from the 3 tabs daily, we should go to 2 tabs. I will update the prescription.  Please let me know if you have any questions.  Lavern Harley MD

## 2020-05-04 DIAGNOSIS — E06.3 HASHIMOTO'S THYROIDITIS: ICD-10-CM

## 2020-05-04 RX ORDER — THYROID 15 MG/1
15 TABLET ORAL
Qty: 60 TABLET | Refills: 0 | Status: SHIPPED | OUTPATIENT
Start: 2020-05-04 | End: 2020-05-06

## 2020-05-04 NOTE — TELEPHONE ENCOUNTER
Per 3/2/20 [employer] message, patient due for appointment and labs with PCP in 1 more month.  Regina Biggs BSN, RN

## 2020-05-06 ENCOUNTER — TELEPHONE (OUTPATIENT)
Dept: FAMILY MEDICINE | Facility: CLINIC | Age: 40
End: 2020-05-06

## 2020-05-06 DIAGNOSIS — E06.3 HASHIMOTO'S THYROIDITIS: ICD-10-CM

## 2020-05-06 RX ORDER — THYROID,PORK 15 MG
15 TABLET ORAL
Qty: 30 TABLET | Refills: 0 | Status: SHIPPED | OUTPATIENT
Start: 2020-05-06 | End: 2020-07-01

## 2020-05-06 NOTE — TELEPHONE ENCOUNTER
Information below is reviewed with  Dr Radha Lay, Verbal Orders, patient needs lab first, then video visit.   Can schedule lab tomorrow, and then have video visit and consider decreasing dose.     Verbal Orders okay for 14 day  Supply for TESS, which is sent now, prior to next refill needs labs and Video visit.     Please help patient schedule appointments and then please route to provider to cosign Verbal Orders.  Lidya Moralez RN

## 2020-05-06 NOTE — TELEPHONE ENCOUNTER
MISSING/ILLEGIBLE INFORMATION ON RX.    DRUG: THYROID (USP) 15 MG TABLET    CLARIFICATION: PLEASE SEND NEW RX WITH TESS 1 FOR INSURANCE TO COVER THIS MEDICATION.

## 2020-05-06 NOTE — TELEPHONE ENCOUNTER
Per 3/2/20 [employer] message, patient due for appointment and labs with PCP in 1 more month.  No pending appointment.  Short term refill was prescribe today.  Requesting TESS, please advise  Lidya Moralez RN

## 2020-05-07 NOTE — TELEPHONE ENCOUNTER
TC called patient to get her scheduled for Lab per RN's note below, patient declined and also told TC to cancel her Video appointment that was scheduled on 05/11/20. Patient states she has enough medication to last until her July 8th. Patient also stated her spouse was in quarantine right now and she is not feeling well either now.   CANDIS Caruso

## 2020-06-09 ENCOUNTER — MYC MEDICAL ADVICE (OUTPATIENT)
Dept: FAMILY MEDICINE | Facility: CLINIC | Age: 40
End: 2020-06-09

## 2020-06-09 DIAGNOSIS — R79.0 LOW FERRITIN: Primary | ICD-10-CM

## 2020-06-09 DIAGNOSIS — Z13.1 SCREENING FOR DIABETES MELLITUS: ICD-10-CM

## 2020-06-09 DIAGNOSIS — Z13.6 CARDIOVASCULAR SCREENING; LDL GOAL LESS THAN 160: ICD-10-CM

## 2020-06-30 DIAGNOSIS — R79.0 LOW FERRITIN: ICD-10-CM

## 2020-06-30 DIAGNOSIS — Z13.1 SCREENING FOR DIABETES MELLITUS: ICD-10-CM

## 2020-06-30 DIAGNOSIS — Z13.6 CARDIOVASCULAR SCREENING; LDL GOAL LESS THAN 160: ICD-10-CM

## 2020-06-30 DIAGNOSIS — E06.3 HASHIMOTO'S THYROIDITIS: ICD-10-CM

## 2020-06-30 LAB
CHOLEST SERPL-MCNC: 220 MG/DL
FERRITIN SERPL-MCNC: 25 NG/ML (ref 12–150)
GLUCOSE SERPL-MCNC: 108 MG/DL (ref 70–99)
HDLC SERPL-MCNC: 48 MG/DL
LDLC SERPL CALC-MCNC: 146 MG/DL
NONHDLC SERPL-MCNC: 172 MG/DL
TRIGL SERPL-MCNC: 129 MG/DL
TSH SERPL DL<=0.005 MIU/L-ACNC: 1.38 MU/L (ref 0.4–4)

## 2020-06-30 PROCEDURE — 82728 ASSAY OF FERRITIN: CPT | Performed by: FAMILY MEDICINE

## 2020-06-30 PROCEDURE — 82947 ASSAY GLUCOSE BLOOD QUANT: CPT | Performed by: FAMILY MEDICINE

## 2020-06-30 PROCEDURE — 80061 LIPID PANEL: CPT | Performed by: FAMILY MEDICINE

## 2020-06-30 PROCEDURE — 36415 COLL VENOUS BLD VENIPUNCTURE: CPT | Performed by: FAMILY MEDICINE

## 2020-06-30 PROCEDURE — 84443 ASSAY THYROID STIM HORMONE: CPT | Performed by: FAMILY MEDICINE

## 2020-07-01 ENCOUNTER — TELEPHONE (OUTPATIENT)
Dept: FAMILY MEDICINE | Facility: CLINIC | Age: 40
End: 2020-07-01

## 2020-07-01 DIAGNOSIS — E06.3 HASHIMOTO'S THYROIDITIS: ICD-10-CM

## 2020-07-01 RX ORDER — THYROID,PORK 15 MG
15 TABLET ORAL
Qty: 30 TABLET | Refills: 0 | Status: SHIPPED | OUTPATIENT
Start: 2020-07-01 | End: 2020-07-16

## 2020-07-01 NOTE — RESULT ENCOUNTER NOTE
Deni,  Here are your lab results.  We will discuss these at your upcoming office or telephone visit.   See you soon.  Radha Lay MD

## 2020-07-01 NOTE — TELEPHONE ENCOUNTER
Prescription is sent to pharmacy.  Need to keep upcoming appointment for further refills  Lidya Moralez RN

## 2020-07-01 NOTE — TELEPHONE ENCOUNTER
Patient was bumped to the following week due to schedule changes at the clinic. Needs refill on ArmourThyroid medication until seen.  CANDIS Caruso

## 2020-07-02 NOTE — PROGRESS NOTES
"Subjective     Deni Simental is a 39 year old female who presents to clinic today for the following health issues:    History of Present Illness        Hyperlipidemia:  She presents for follow up of hyperlipidemia.  She is not taking medication to lower cholesterol. She is not having myalgia or other side effects to statin medications.    Hypothyroidism:     Since last visit, patient describes the following symptoms::  Weight gain    Weight gain::  11-15 lbs.    She eats 0-1 servings of fruits and vegetables daily.She consumes 5 sweetened beverage(s) daily.She exercises with enough effort to increase her heart rate 9 or less minutes per day.  She exercises with enough effort to increase her heart rate 3 or less days per week.   She is taking medications regularly.  really wants to continue thyroid medication, feels well and asymptomatic. Questionable if truly hypothyroid.        H/o ADD as a child, well controlled on ritalin.  Father stopped med because \"it was time to learn to control yourself\".    Would like to restart a medication due to significant focusing issues.        How many days per week do you miss taking your medication? 0        Patient Active Problem List   Diagnosis     CARDIOVASCULAR SCREENING; LDL GOAL LESS THAN 160     Migraine with aura and without status migrainosus, not intractable     Hashimoto's thyroiditis     Thyroglobulin antibody positive     Alopecia     Fatigue, unspecified type     Poor sleep hygiene     Thyroid nodule     Past Surgical History:   Procedure Laterality Date     HC TOOTH EXTRACTION W/FORCEP         Social History     Tobacco Use     Smoking status: Former Smoker     Packs/day: 0.00     Years: 0.00     Pack years: 0.00     Types: Cigarettes     Last attempt to quit: 2011     Years since quittin.8     Smokeless tobacco: Never Used     Tobacco comment: Lives in smoke free household   Substance Use Topics     Alcohol use: No     Alcohol/week: 0.0 standard " drinks     Family History   Problem Relation Age of Onset     Alcohol/Drug Mother          MVA alcohol related     Diabetes Father         hypoglycemia     Hypertension Father      Psoriatic Arthritis Father      Cancer Paternal Grandfather         lung cancer     Hypothyroidism Paternal Grandmother      Graves' disease Sister         Graves, tumor     C.A.D. No family hx of      Depression No family hx of      Gastrointestinal Disease No family hx of      Heart Disease No family hx of      Lipids No family hx of      Neurologic Disorder No family hx of      Respiratory No family hx of      Cerebrovascular Disease No family hx of      Glaucoma No family hx of      Macular Degeneration No family hx of          Current Outpatient Medications   Medication Sig Dispense Refill     ARMOUR THYROID 15 MG tablet Take 1 tablet (15 mg) by mouth 2 times daily 90 tablet 3     hydrOXYzine (ATARAX) 25 MG tablet Take 1-2 tablets (25-50 mg) by mouth 3 times daily as needed for itching May cause sedation so do not take while driving or operating machinery 30 tablet 0     methylphenidate HCl ER (CONCERTA) 18 MG CR tablet Take 1 tablet (18 mg) by mouth every morning 30 tablet 0     rizatriptan (MAXALT) 5 MG tablet Take 1 tablet (5 mg) by mouth at onset of headache for migraine 18 tablet 4     triamcinolone (KENALOG) 0.1 % external cream Apply topically 2 times daily For 14 days 80 g 0     BP Readings from Last 3 Encounters:   20 110/80   20 96/74   20 117/77    Wt Readings from Last 3 Encounters:   20 95.3 kg (210 lb)   20 91.2 kg (201 lb)   19 90.7 kg (200 lb)                    Reviewed and updated as needed this visit by Provider  Tobacco  Allergies  Meds  Problems  Med Hx  Surg Hx  Fam Hx         Review of Systems   Constitutional, HEENT, cardiovascular, pulmonary, gi and gu systems are negative, except as otherwise noted.      Objective    /80   Pulse 82   Temp 98.1  F (36.7   C) (Oral)   Resp 16   Wt 95.3 kg (210 lb)   SpO2 97%   BMI 34.41 kg/m    Body mass index is 34.41 kg/m .  Physical Exam   GENERAL: healthy, alert and no distress  EYES: Eyes grossly normal to inspection, PERRL and conjunctivae and sclerae normal  NECK: no adenopathy, no asymmetry, masses, or scars and thyroid normal to palpation  MS: no gross musculoskeletal defects noted, no edema  SKIN: no suspicious lesions or rashes  PSYCH: mentation appears normal, affect normal/bright    Diagnostic Test Results:  Labs reviewed in Epic        Assessment & Plan     (F98.8) Attention deficit disorder (ADD) without hyperactivity  (primary encounter diagnosis)  Comment: desires medication  Plan: methylphenidate HCl ER (CONCERTA) 18 MG CR         tablet        Diagnosed in childhood, responded well to stimulants.  Reviewed treatment options, trial of concerta, MyChart message timed to send in 3 weeks for follow-up     (E06.3) Hashimoto's thyroiditis  Comment: stable  Plan: ARMOUR THYROID 15 MG tablet        Refill x 1 yr     (G43.109) Migraine with aura and without status migrainosus, not intractable  Comment: stable, infrequent  Plan: rizatriptan (MAXALT) 5 MG tablet         Refill x 6 months          Patient Instructions       Start Concerta 18 mg daily for ADD.  Watch for [email or web address] message in 3 weeks to check in.      Return in about 3 weeks (around 8/6/2020) for [email or web address] message.    Radha Lay MD  [facility]

## 2020-07-06 ENCOUNTER — MYC MEDICAL ADVICE (OUTPATIENT)
Dept: FAMILY MEDICINE | Facility: OTHER | Age: 40
End: 2020-07-06

## 2020-07-06 ENCOUNTER — VIRTUAL VISIT (OUTPATIENT)
Dept: FAMILY MEDICINE | Facility: OTHER | Age: 40
End: 2020-07-06
Payer: COMMERCIAL

## 2020-07-06 DIAGNOSIS — L30.9 DERMATITIS: Primary | ICD-10-CM

## 2020-07-06 PROCEDURE — 99213 OFFICE O/P EST LOW 20 MIN: CPT | Mod: GT | Performed by: NURSE PRACTITIONER

## 2020-07-06 RX ORDER — TRIAMCINOLONE ACETONIDE 1 MG/G
CREAM TOPICAL 2 TIMES DAILY
Qty: 80 G | Refills: 0 | Status: SHIPPED | OUTPATIENT
Start: 2020-07-06 | End: 2020-09-21

## 2020-07-06 RX ORDER — HYDROXYZINE HYDROCHLORIDE 25 MG/1
25-50 TABLET, FILM COATED ORAL 3 TIMES DAILY PRN
Qty: 30 TABLET | Refills: 0 | Status: SHIPPED | OUTPATIENT
Start: 2020-07-06 | End: 2020-09-21

## 2020-07-06 RX ORDER — PREDNISONE 20 MG/1
20 TABLET ORAL DAILY
Qty: 5 TABLET | Refills: 0 | Status: SHIPPED | OUTPATIENT
Start: 2020-07-06 | End: 2020-07-11

## 2020-07-06 NOTE — PROGRESS NOTES
"Deni Simental is a 39 year old female who is being evaluated via a billable video visit.      The patient has been notified of following:     \"This video visit will be conducted via a call between you and your physician/provider. We have found that certain health care needs can be provided without the need for an in-person physical exam.  This service lets us provide the care you need with a video conversation.  If a prescription is necessary we can send it directly to your pharmacy.  If lab work is needed we can place an order for that and you can then stop by our lab to have the test done at a later time.    Video visits are billed at different rates depending on your insurance coverage.  Please reach out to your insurance provider with any questions.    If during the course of the call the physician/provider feels a video visit is not appropriate, you will not be charged for this service.\"    Patient has given verbal consent for Video visit? Yes  How would you like to obtain your AVS? Sheila  Patient would like the video invitation sent by: Text to cell phone: [phone] KELSEA  Will anyone else be joining your video visit? No    Subjective     Deni Simental is a 39 year old female who presents today via video visit for the following health issues:    HPI  Rash  Onset: 07/05/2020  Felt like Fiberglass on her back the day prior    Description:   Location: Back, right shoulder, ribs and hips  Character: round, blotchy, red  Itching (Pruritis): YES    Progression of Symptoms:  worsening    Accompanying Signs & Symptoms:  Fever: no   Body aches or joint pain: no   Sore throat symptoms: no   Recent cold symptoms: no     History:   Previous similar rash: YES    Precipitating factors:   Exposure to similar rash: no   New exposures: Patient was poked by a bal.   Recent travel: no     Alleviating factors:  None    Therapies Tried and outcome: Two Benadryl with no relief.       Video Start Time: " 11:03        Current Outpatient Medications   Medication Sig Dispense Refill     ARMOUR THYROID 15 MG tablet Take 1 tablet (15 mg) by mouth 2 times daily 30 tablet 0     hydrOXYzine (ATARAX) 25 MG tablet Take 1-2 tablets (25-50 mg) by mouth 3 times daily as needed for itching May cause sedation so do not take while driving or operating machinery 30 tablet 0     predniSONE (DELTASONE) 20 MG tablet Take 1 tablet (20 mg) by mouth daily for 5 days 5 tablet 0     rizatriptan (MAXALT) 5 MG tablet Take 1 tablet (5 mg) by mouth at onset of headache for migraine 18 tablet 0     triamcinolone (KENALOG) 0.1 % external cream Apply topically 2 times daily For 14 days 80 g 0       Reviewed and updated as needed this visit by Provider         Review of Systems       Objective             Physical Exam     GENERAL: Healthy, alert and no distress  EYES: Eyes grossly normal to inspection.  No discharge or erythema, or obvious scleral/conjunctival abnormalities.  RESP: No audible wheeze, cough, or visible cyanosis.  No visible retractions or increased work of breathing.    SKIN: hives present along all of upper back with scratch lines.   NEURO: Cranial nerves grossly intact.  Mentation and speech appropriate for age.  PSYCH: Mentation appears normal, affect normal/bright, judgement and insight intact, normal speech and appearance well-groomed.      Diagnostic Test Results:  none         Assessment & Plan     1. Dermatitis  - Concerns for a contact dermatitis, likely worsened with heat rash as well.   - Recommend trial of both topical and oral steroids given systemic locations as noted above.   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 I would like to see if the rash improves and if she is sweating constantly we will not be able to evaluate her improvement  properly. Patient agreed with plan.   - hydrOXYzine (ATARAX) 25 MG tablet; Take 1-2 tablets (25-50 mg) by mouth 3 times daily as needed for itching May cause sedation so do not take while driving or operating machinery  Dispense: 30 tablet; Refill: 0  - predniSONE (DELTASONE) 20 MG tablet; Take 1 tablet (20 mg) by mouth daily for 5 days  Dispense: 5 tablet; Refill: 0  - triamcinolone (KENALOG) 0.1 % external cream; Apply topically 2 times daily For 14 days  Dispense: 80 g; Refill: 0       The patient indicates understanding of these issues and agrees with the plan.    Patient Instructions   - Start Prednisone once daily ( recommend doing in the AM or early afternoon as this will keep you up the later you take it)  - Recommend applying triamcinolone cream to affected areas twice dailyi  - Start Zyrtec (over the counter, I sent an RX for this as well if your insurance will cover)  - Start Atarax (instead of benadryl) for itching  - Apply cold compress to site to help avoid itching   - Monitor area for worsening symptoms  - Stay indoors to help prevent worsening perspiration and moisture.   - Work letter sent to [employer] Check in with me on Wednesday.     NAYANA Guevara CNP  Questions or concerns please feel free to send me a [employer] message or call me  Phone : [phone]      Patient Education           Return in about 3 days (around 7/9/2020) for If not improved.    NAYANA Guevara CNP  [facility]      Video-Visit Details    Type of service:  Video Visit    Video End Time:11:22    Originating Location (pt. Location): Home    Distant Location (provider location):  [facility]     Platform used for Video Visit: Conch[employer]    No follow-ups on file.       NAYANA Guevara CNP

## 2020-07-06 NOTE — LETTER
[address]  [facility] [address]  Phone: [phone]    July 8, 2020        Deni GARAY Simental  [address] [facility] [address]          To whom it may concern:    RE: Deni Simental    Patient was treated under my care. Recommend out of work and returning 07/10/20 due to current condition.     Please contact me for questions or concerns.      Sincerely,        NAYANA Guevara CNP

## 2020-07-06 NOTE — PATIENT INSTRUCTIONS
- Start Prednisone once daily ( recommend doing in the AM or early afternoon as this will keep you up the later you take it)  - Recommend applying triamcinolone cream to affected areas twice dailyi  - Start Zyrtec (over the counter, I sent an RX for this as well if your insurance will cover)  - Start Atarax (instead of benadryl) for itching  - Apply cold compress to site to help avoid itching   - Monitor area for worsening symptoms  - Stay indoors to help prevent worsening perspiration and moisture.   - Work letter sent to [employer]. Check in with me on Wednesday.     NAYANA Guevara CNP  Questions or concerns please feel free to send me a [employer] message or call me  Phone : [phone]      Patient Education

## 2020-07-06 NOTE — LETTER
[address]  [facility] [address]  Phone: [phone]    July 6, 2020        Deni Simental  [address] [facility] [address]          To whom it may concern:    RE: Deni Simental    Patient was seen and treated today at our clinic.Recommend being out of work 07/06/20  - 07/09/20 due to current condition. Able to return 07/10/20.     Please contact me for questions or concerns.      Sincerely,        NAYANA Guevara CNP

## 2020-07-08 NOTE — TELEPHONE ENCOUNTER
Responded via [employer].       NAYANA Guevara CNP  Questions or concerns please feel free to send me a [employer] message or call me  Phone : [phone]

## 2020-07-13 ENCOUNTER — TELEPHONE (OUTPATIENT)
Dept: FAMILY MEDICINE | Facility: CLINIC | Age: 40
End: 2020-07-13

## 2020-07-13 NOTE — TELEPHONE ENCOUNTER
To Provider see message from patient regarding needing a note for daughters quarantine. Is this something you can do please advise.  CANDIS Caruso

## 2020-07-13 NOTE — LETTER
To Whom It May Concern,    The current CDC guidelines recommend that individuals with mild respirartory symptoms, who are not tested for COVID-19, be directed to care for themselves at home and self-isolate.     The amount of time for home isolation needs to be at least 7 days since the first symptoms appeared plus at least 3 days of no fevers and improvement of respiratory symptoms. Please do NOT ask patients to get tested as supplies are extremely limited.  Also, we cannot offer a CLEARED FOR WORK note other than this letter.      We ask that all employers adhere by the CDC guidelines.      Sincerely,        Radha Lay MD

## 2020-07-13 NOTE — TELEPHONE ENCOUNTER
Patient states [facility] has said that her daughter should go and be tested for COVID and because patient will be in quarantine until they know the results, she will need a note excusing her from from work until then.  [facility]    Thank you.

## 2020-07-16 ENCOUNTER — MYC MEDICAL ADVICE (OUTPATIENT)
Dept: FAMILY MEDICINE | Facility: CLINIC | Age: 40
End: 2020-07-16

## 2020-07-16 ENCOUNTER — OFFICE VISIT (OUTPATIENT)
Dept: FAMILY MEDICINE | Facility: CLINIC | Age: 40
End: 2020-07-16
Payer: COMMERCIAL

## 2020-07-16 VITALS
DIASTOLIC BLOOD PRESSURE: 80 MMHG | TEMPERATURE: 98.1 F | BODY MASS INDEX: 34.41 KG/M2 | OXYGEN SATURATION: 97 % | HEART RATE: 82 BPM | WEIGHT: 210 LBS | SYSTOLIC BLOOD PRESSURE: 110 MMHG | RESPIRATION RATE: 16 BRPM

## 2020-07-16 DIAGNOSIS — E06.3 HASHIMOTO'S THYROIDITIS: ICD-10-CM

## 2020-07-16 DIAGNOSIS — F98.8 ATTENTION DEFICIT DISORDER (ADD) WITHOUT HYPERACTIVITY: ICD-10-CM

## 2020-07-16 DIAGNOSIS — F98.8 ATTENTION DEFICIT DISORDER (ADD) WITHOUT HYPERACTIVITY: Primary | ICD-10-CM

## 2020-07-16 DIAGNOSIS — G43.109 MIGRAINE WITH AURA AND WITHOUT STATUS MIGRAINOSUS, NOT INTRACTABLE: ICD-10-CM

## 2020-07-16 PROCEDURE — 99214 OFFICE O/P EST MOD 30 MIN: CPT | Performed by: FAMILY MEDICINE

## 2020-07-16 RX ORDER — METHYLPHENIDATE HYDROCHLORIDE 18 MG/1
18 TABLET ORAL EVERY MORNING
Qty: 30 TABLET | Refills: 0 | Status: SHIPPED | OUTPATIENT
Start: 2020-07-16 | End: 2020-08-06

## 2020-07-16 RX ORDER — RIZATRIPTAN BENZOATE 5 MG/1
5 TABLET ORAL
Qty: 18 TABLET | Refills: 4 | Status: SHIPPED | OUTPATIENT
Start: 2020-07-16 | End: 2021-11-02 | Stop reason: ALTCHOICE

## 2020-07-16 RX ORDER — THYROID,PORK 15 MG
15 TABLET ORAL
Qty: 90 TABLET | Refills: 3 | Status: SHIPPED | OUTPATIENT
Start: 2020-07-16 | End: 2020-09-28

## 2020-07-16 NOTE — NURSING NOTE
"Chief Complaint   Patient presents with     Thyroid Disease       Initial /80   Pulse 82   Temp 98.1  F (36.7  C) (Oral)   Resp 16   Wt 95.3 kg (210 lb)   SpO2 97%   BMI 34.41 kg/m   Estimated body mass index is 34.41 kg/m  as calculated from the following:    Height as of 2/17/20: 1.664 m (5' 5.5\").    Weight as of this encounter: 95.3 kg (210 lb).  Medication Reconciliation: complete  Ike Forman CMA    "

## 2020-08-06 RX ORDER — METHYLPHENIDATE HYDROCHLORIDE 27 MG/1
27 TABLET ORAL EVERY MORNING
Qty: 30 TABLET | Refills: 0 | Status: SHIPPED | OUTPATIENT
Start: 2020-08-06 | End: 2020-09-21

## 2020-09-21 ENCOUNTER — E-VISIT (OUTPATIENT)
Dept: FAMILY MEDICINE | Facility: CLINIC | Age: 40
End: 2020-09-21
Payer: COMMERCIAL

## 2020-09-21 DIAGNOSIS — L30.9 DERMATITIS: ICD-10-CM

## 2020-09-21 DIAGNOSIS — F98.8 ATTENTION DEFICIT DISORDER (ADD) WITHOUT HYPERACTIVITY: ICD-10-CM

## 2020-09-21 PROCEDURE — 99421 OL DIG E/M SVC 5-10 MIN: CPT | Performed by: FAMILY MEDICINE

## 2020-09-21 RX ORDER — METHYLPHENIDATE HYDROCHLORIDE 36 MG/1
36 TABLET ORAL EVERY MORNING
Qty: 30 TABLET | Refills: 0 | Status: SHIPPED | OUTPATIENT
Start: 2020-09-21 | End: 2020-10-27

## 2020-09-29 ENCOUNTER — TELEPHONE (OUTPATIENT)
Dept: FAMILY MEDICINE | Facility: CLINIC | Age: 40
End: 2020-09-29

## 2020-09-29 DIAGNOSIS — E06.3 HASHIMOTO'S THYROIDITIS: ICD-10-CM

## 2020-09-29 RX ORDER — THYROID,PORK 15 MG
15 TABLET ORAL 2 TIMES DAILY
Qty: 180 TABLET | Refills: 2 | Status: SHIPPED | OUTPATIENT
Start: 2020-09-29 | End: 2021-07-13

## 2020-09-29 NOTE — TELEPHONE ENCOUNTER
Message: Ins will only cover if TESS 1, please resend with adjustments.    Drug: [place] Thyroid 15mg tab.

## 2020-12-02 ENCOUNTER — MYC MEDICAL ADVICE (OUTPATIENT)
Dept: FAMILY MEDICINE | Facility: CLINIC | Age: 40
End: 2020-12-02

## 2020-12-02 NOTE — TELEPHONE ENCOUNTER
Last office visit with Dr Radha Lay 7/16/20  Patient is advise to do a Virtual visit.  Lidya Moralez RN

## 2020-12-06 ENCOUNTER — HEALTH MAINTENANCE LETTER (OUTPATIENT)
Age: 40
End: 2020-12-06

## 2020-12-11 ENCOUNTER — E-VISIT (OUTPATIENT)
Dept: FAMILY MEDICINE | Facility: CLINIC | Age: 40
End: 2020-12-11
Payer: COMMERCIAL

## 2020-12-11 DIAGNOSIS — F98.8 ATTENTION DEFICIT DISORDER (ADD) WITHOUT HYPERACTIVITY: ICD-10-CM

## 2020-12-11 PROCEDURE — 99421 OL DIG E/M SVC 5-10 MIN: CPT | Performed by: FAMILY MEDICINE

## 2020-12-11 RX ORDER — METHYLPHENIDATE HYDROCHLORIDE 36 MG/1
72 TABLET ORAL EVERY MORNING
Qty: 60 TABLET | Refills: 0 | Status: SHIPPED | OUTPATIENT
Start: 2020-12-11 | End: 2021-11-02

## 2021-04-11 ENCOUNTER — HEALTH MAINTENANCE LETTER (OUTPATIENT)
Age: 41
End: 2021-04-11

## 2021-07-13 DIAGNOSIS — E06.3 HASHIMOTO'S THYROIDITIS: ICD-10-CM

## 2021-07-13 RX ORDER — THYROID,PORK 15 MG
15 TABLET ORAL 2 TIMES DAILY
Qty: 28 TABLET | Refills: 0 | Status: SHIPPED | OUTPATIENT
Start: 2021-07-13 | End: 2021-08-02

## 2021-07-13 NOTE — TELEPHONE ENCOUNTER
Routing refill request to provider for review/approval because:  Labs not current:  TSH    Regina Biggs BSN, RN

## 2021-07-13 NOTE — TELEPHONE ENCOUNTER
Reason for Call: Request for an order or referral: Med Refill    Order or referral being requested: Needs a two week prescription for [place] Thyroid 15 MG    Date needed: 7/16/21    Has the patient been seen by the PCP for this problem? Yes    Additional comments: [facility] [address] [facility], [place] [address]    Phone number Patient can be reached at:  [phone]    Best Time:  Anytime    Can we leave a detailed message on this number?  Yes    Call taken on 7/13/2021 at 1:06 PM by Diego Zayas

## 2021-07-29 ENCOUNTER — LAB (OUTPATIENT)
Dept: LAB | Facility: CLINIC | Age: 41
End: 2021-07-29
Payer: COMMERCIAL

## 2021-07-29 DIAGNOSIS — E06.3 HASHIMOTO'S THYROIDITIS: ICD-10-CM

## 2021-07-29 LAB — TSH SERPL DL<=0.005 MIU/L-ACNC: 1.42 MU/L (ref 0.4–4)

## 2021-07-29 PROCEDURE — 84443 ASSAY THYROID STIM HORMONE: CPT

## 2021-07-29 PROCEDURE — 36415 COLL VENOUS BLD VENIPUNCTURE: CPT

## 2021-08-02 DIAGNOSIS — E06.3 HASHIMOTO'S THYROIDITIS: ICD-10-CM

## 2021-08-02 RX ORDER — THYROID,PORK 15 MG
15 TABLET ORAL 2 TIMES DAILY
Qty: 180 TABLET | Refills: 0 | Status: SHIPPED | OUTPATIENT
Start: 2021-08-02 | End: 2021-10-26

## 2021-08-22 DIAGNOSIS — G43.109 MIGRAINE WITH AURA AND WITHOUT STATUS MIGRAINOSUS, NOT INTRACTABLE: ICD-10-CM

## 2021-08-22 RX ORDER — RIZATRIPTAN BENZOATE 5 MG/1
TABLET ORAL
Qty: 18 TABLET | Refills: 4 | OUTPATIENT
Start: 2021-08-22

## 2021-09-25 ENCOUNTER — HEALTH MAINTENANCE LETTER (OUTPATIENT)
Age: 41
End: 2021-09-25

## 2021-10-28 PROBLEM — E06.3 HASHIMOTO'S THYROIDITIS: Status: ACTIVE | Noted: 2017-11-22

## 2021-11-01 NOTE — PROGRESS NOTES
"  Assessment & Plan     (G43.109) Migraine with aura and without status migrainosus, not intractable  (primary encounter diagnosis)  Comment: not controlled, mixed ha picture  Plan: rizatriptan (MAXALT) 5 MG tablet, Adult         Neurology Referral        Increase nortriptyline to 50 mg at bedtime. Ok to increase to 75 mg if no change and no side effects after 7-10 days. Notify Radha Lay MD with [employer] message when refill needed and what dose is being used.  Change back to maxalt as is more effective than imitrex    (E06.3) Hashimoto's thyroiditis  Comment: just filled armor  Plan: levothyroxine (SYNTHROID/LEVOTHROID) 25 MCG         tablet, TSH with free T4 reflex        Will use that up then change to levothyroxine 25 mcg daily, recheck tsh after on levothyroxine x 8 weeks    (F98.8) Attention deficit disorder (ADD) in adult  Comment: stopped Concerta due to side effects, would like to trial another med  Plan: consider vyvanse or ritalin, pt will research coverage    (R76.8) Positive MARISELA (antinuclear antibody)  Comment: uncertain etiology  Plan: recommend she follow-up with previous rheum due to wanting additional test to satisfy her father.                  BMI:   Estimated body mass index is 37.79 kg/m  as calculated from the following:    Height as of this encounter: 1.638 m (5' 4.5\").    Weight as of this encounter: 101.4 kg (223 lb 9.6 oz).   Weight management plan: Discussed healthy diet and exercise guidelines    Patient Instructions       Increase nortriptyline to 50 mg each night, if not helpful to decrease headaches after 7-10 days increase to 75 mg.  Notify Radha Lay MD via [employer] your final dose when you need refill.      Expect a phone call to schedule with a neurologist.    Change from imitrex to maxalt for acute headaches.          Return in about 3 months (around 2/2/2022) for results.    Radha Lay MD  [facility]   Deni is a 40 year old who " "presents for the following health issues     History of Present Illness       Hypothyroidism:     Since last visit, patient describes the following symptoms::  Constipation, Fatigue, Hair loss, Loose stools and Weight gain    Weight gain::  16-20 lbs.    Migraines:   Since the patient's last clinic visit, headaches are: worsened  The patient is getting headaches:  2-3 per month  She is not able to do normal daily activities when she has a migraine.  The patient is taking the following rescue/relief medications:  Ibuprofen (Advil, Motrin), Naproxyn (Aleve), Tylenol, Excedrin and Maxalt   Patient states \"I get only a small amount of relief\" from the rescue/relief medications.   The patient is taking the following medications to prevent migraines:  Other (nortriptyline)  In the past 4 weeks, the patient has gone to an Urgent Care or Emergency Room 0 times times due to headaches.    She eats 0-1 servings of fruits and vegetables daily.She consumes 3 sweetened beverage(s) daily.She exercises with enough effort to increase her heart rate 9 or less minutes per day.  She exercises with enough effort to increase her heart rate 3 or less days per week.   She is taking medications regularly.     Pt seen at [facility] over the summer.  Several changes.  Migraine: continues to have near daily tension ha, worsens to left behind the eye migraine ha.  Maxalt works better than imitrex for migraine treatment.  Tried acupuncture and chiro treatments without improvement.  Now on nortriptyline at night 25 mg for prophylaxis which has helped some, no side effects.    Had rheum w/up due to ha, +MARISELA with high titer, saw rheum and remaining testing was negative. Thought that Hashimotos was cause for + MARISELA. Pt's father has psoriatic arthritis with overall negative testing except \"gamma\" testing.        Stopped Concerta due to lack of impulse control.  Went on an [employer] shopping binge. Willing to trial a different medication cautiously.    Would " "like to change from Armor thyroid to levothyroxine. Queried MTM for conversion, current dose of 15 mg bid of armor equates to 25 mcg daily of levothyroxine.            Review of Systems   Constitutional, HEENT, cardiovascular, pulmonary, gi and gu systems are negative, except as otherwise noted.      Objective    /87   Pulse 105   Temp 98.3  F (36.8  C) (Oral)   Resp 16   Ht 1.638 m (5' 4.5\")   Wt 101.4 kg (223 lb 9.6 oz)   LMP 10/25/2021   SpO2 98%   BMI 37.79 kg/m    Body mass index is 37.79 kg/m .  Physical Exam   GENERAL: healthy, alert and no distress  EYES: Eyes grossly normal to inspection, PERRL and conjunctivae and sclerae normal  MS: no gross musculoskeletal defects noted, no edema  SKIN: no suspicious lesions or rashes  PSYCH: mentation appears normal, affect normal/bright                "

## 2021-11-02 ENCOUNTER — MYC MEDICAL ADVICE (OUTPATIENT)
Dept: FAMILY MEDICINE | Facility: CLINIC | Age: 41
End: 2021-11-02

## 2021-11-02 ENCOUNTER — OFFICE VISIT (OUTPATIENT)
Dept: FAMILY MEDICINE | Facility: CLINIC | Age: 41
End: 2021-11-02
Payer: COMMERCIAL

## 2021-11-02 VITALS
RESPIRATION RATE: 16 BRPM | HEIGHT: 65 IN | TEMPERATURE: 98.3 F | DIASTOLIC BLOOD PRESSURE: 87 MMHG | OXYGEN SATURATION: 98 % | HEART RATE: 105 BPM | WEIGHT: 223.6 LBS | BODY MASS INDEX: 37.25 KG/M2 | SYSTOLIC BLOOD PRESSURE: 129 MMHG

## 2021-11-02 DIAGNOSIS — G43.109 MIGRAINE WITH AURA AND WITHOUT STATUS MIGRAINOSUS, NOT INTRACTABLE: Primary | ICD-10-CM

## 2021-11-02 DIAGNOSIS — E06.3 HASHIMOTO'S THYROIDITIS: ICD-10-CM

## 2021-11-02 DIAGNOSIS — F98.8 ATTENTION DEFICIT DISORDER (ADD) IN ADULT: ICD-10-CM

## 2021-11-02 DIAGNOSIS — F90.9 ADULT ADHD: Primary | ICD-10-CM

## 2021-11-02 DIAGNOSIS — R76.8 POSITIVE ANA (ANTINUCLEAR ANTIBODY): ICD-10-CM

## 2021-11-02 PROCEDURE — 99214 OFFICE O/P EST MOD 30 MIN: CPT | Performed by: FAMILY MEDICINE

## 2021-11-02 RX ORDER — RIZATRIPTAN BENZOATE 5 MG/1
5 TABLET ORAL
Qty: 12 TABLET | Refills: 4 | Status: SHIPPED | OUTPATIENT
Start: 2021-11-02 | End: 2022-04-07

## 2021-11-02 RX ORDER — SUMATRIPTAN 25 MG/1
25 TABLET, FILM COATED ORAL PRN
COMMUNITY
Start: 2021-09-02 | End: 2021-11-02

## 2021-11-02 RX ORDER — ALBUTEROL SULFATE 90 UG/1
1-2 AEROSOL, METERED RESPIRATORY (INHALATION) PRN
COMMUNITY
Start: 2020-09-25 | End: 2023-07-05

## 2021-11-02 RX ORDER — LISDEXAMFETAMINE DIMESYLATE 30 MG/1
30 CAPSULE ORAL EVERY MORNING
Qty: 30 CAPSULE | Refills: 0 | Status: SHIPPED | OUTPATIENT
Start: 2021-11-02 | End: 2022-04-07

## 2021-11-02 RX ORDER — NORTRIPTYLINE HCL 25 MG
25 CAPSULE ORAL AT BEDTIME
COMMUNITY
Start: 2021-09-02 | End: 2021-11-17

## 2021-11-02 RX ORDER — LEVOTHYROXINE SODIUM 25 UG/1
25 TABLET ORAL DAILY
Qty: 90 TABLET | Refills: 1 | Status: SHIPPED | OUTPATIENT
Start: 2021-11-02 | End: 2022-06-22

## 2021-11-02 RX ORDER — ALBUTEROL SULFATE 0.83 MG/ML
2.5 SOLUTION RESPIRATORY (INHALATION) PRN
COMMUNITY
Start: 2020-10-01

## 2021-11-02 ASSESSMENT — MIFFLIN-ST. JEOR: SCORE: 1687.18

## 2021-11-02 ASSESSMENT — PAIN SCALES - GENERAL: PAINLEVEL: MODERATE PAIN (5)

## 2021-11-02 NOTE — PATIENT INSTRUCTIONS
Increase nortriptyline to 50 mg each night, if not helpful to decrease headaches after 7-10 days increase to 75 mg.  Notify Radha Lay MD via [employer] your final dose when you need refill.      Expect a phone call to schedule with a neurologist.    Change from imitrex to maxalt for acute headaches.

## 2021-11-02 NOTE — NURSING NOTE
"Chief Complaint   Patient presents with     Thyroid Disease       Initial /87   Pulse 105   Temp 98.3  F (36.8  C) (Oral)   Resp 16   Ht 1.638 m (5' 4.5\")   Wt 101.4 kg (223 lb 9.6 oz)   LMP 10/25/2021   SpO2 98%   BMI 37.79 kg/m   Estimated body mass index is 37.79 kg/m  as calculated from the following:    Height as of this encounter: 1.638 m (5' 4.5\").    Weight as of this encounter: 101.4 kg (223 lb 9.6 oz).  Medication Reconciliation: complete  Ike Forman CMA    "

## 2021-11-16 ENCOUNTER — MYC MEDICAL ADVICE (OUTPATIENT)
Dept: FAMILY MEDICINE | Facility: CLINIC | Age: 41
End: 2021-11-16
Payer: COMMERCIAL

## 2021-11-16 DIAGNOSIS — G43.109 MIGRAINE WITH AURA AND WITHOUT STATUS MIGRAINOSUS, NOT INTRACTABLE: Primary | ICD-10-CM

## 2021-11-17 RX ORDER — NORTRIPTYLINE HYDROCHLORIDE 50 MG/1
50 CAPSULE ORAL AT BEDTIME
Qty: 90 CAPSULE | Refills: 1 | Status: SHIPPED | OUTPATIENT
Start: 2021-11-17 | End: 2022-05-01

## 2021-11-17 NOTE — TELEPHONE ENCOUNTER
Routing refill request to provider for review/approval because:  Medication is reported/historical  Pended Nortriptyline 50 mg.     Lidya Moralez RN

## 2021-12-13 ENCOUNTER — VIRTUAL VISIT (OUTPATIENT)
Dept: FAMILY MEDICINE | Facility: CLINIC | Age: 41
End: 2021-12-13
Payer: COMMERCIAL

## 2021-12-13 DIAGNOSIS — J06.9 UPPER RESPIRATORY TRACT INFECTION, UNSPECIFIED TYPE: Primary | ICD-10-CM

## 2021-12-13 PROCEDURE — 99214 OFFICE O/P EST MOD 30 MIN: CPT | Mod: 95 | Performed by: FAMILY MEDICINE

## 2021-12-13 RX ORDER — CODEINE PHOSPHATE AND GUAIFENESIN 10; 100 MG/5ML; MG/5ML
1-2 SOLUTION ORAL EVERY 4 HOURS PRN
Qty: 240 ML | Refills: 0 | Status: SHIPPED | OUTPATIENT
Start: 2021-12-13 | End: 2022-02-16

## 2021-12-13 NOTE — LETTER
[address] [address]  Phone: [phone]    December 13, 2021        Deni Simental  [address] [facility] [address]          To whom it may concern:    RE: Deni Simental    Patient was seen and treated today at our clinic.  She is under evaluation for Covid.  Will need to remain in isolation until test results are known and symptomatically improved.    Please contact me for questions or concerns.      Sincerely,        Bea Moe MD

## 2021-12-13 NOTE — PROGRESS NOTES
Deni is a 41 year old who is being evaluated via a billable video visit.      How would you like to obtain your AVS? MyChart  If the video visit is dropped, the invitation should be resent by: Text to cell phone: 1  Will anyone else be joining your video visit? No      Video Start Time: 0842    Assessment & Plan     Upper respiratory tract infection, unspecified type  Previously vaccinated.  Daughter came down with illness symptoms including fever, congestion, cough 4 days ago.  Patient herself became symptomatic in the last 1 to 2 days.  Not much in the way of fever but lots of congestion and cough and fatigue.  Daughter was tested last week but results still pending.  Patient is scheduled for a test today.  Discussed need to remain in isolation until we know the results and symptomatically better.  Discussed current CDC guidelines.  Can use some Robitussin with codeine to help symptomatically until we know.  - guaiFENesin-codeine (CHERATUSSIN AC) 100-10 MG/5ML solution; Take 5-10 mLs by mouth every 4 hours as needed for cough       See Patient Instructions    Return in about 3 days (around 12/16/2021) for Based upon lab results.    Bea Moe MD  [facility]    Leora Cheema is a 41 year old who presents for the following health issues     HPI     Video visit with patient today for upper respiratory illness, possible Covid.  Daughter was exposed to somebody with Covid and then became symptomatic.  Her test is pending but she has improved.  Patient started symptoms 1 to 2 days ago and is scheduled for a test today.    Review of Systems   Constitutional, HEENT, cardiovascular, pulmonary, gi and gu systems are negative, except as otherwise noted.      Objective           Vitals:  No vitals were obtained today due to virtual visit.    Physical Exam   GENERAL: Healthy, alert and no distress  EYES: Eyes grossly normal to inspection.  No discharge or erythema, or obvious  scleral/conjunctival abnormalities.  RESP: No audible wheeze, cough, or visible cyanosis.  No visible retractions or increased work of breathing.    SKIN: Visible skin clear. No significant rash, abnormal pigmentation or lesions.  NEURO: Cranial nerves grossly intact.  Mentation and speech appropriate for age.  PSYCH: Mentation appears normal, affect normal/bright, judgement and insight intact, normal speech and appearance well-groomed.    Past labs reviewed with the patient.             Video-Visit Details    Type of service:  Video Visit    Video End Time:0847    Originating Location (pt. Location): Home    Distant Location (provider location):  [facility]     Platform used for Video Visit: [employer]

## 2022-01-21 ENCOUNTER — E-VISIT (OUTPATIENT)
Dept: FAMILY MEDICINE | Facility: CLINIC | Age: 42
End: 2022-01-21

## 2022-01-21 DIAGNOSIS — R51.9 CHRONIC DAILY HEADACHE: ICD-10-CM

## 2022-01-21 DIAGNOSIS — G43.109 MIGRAINE WITH AURA AND WITHOUT STATUS MIGRAINOSUS, NOT INTRACTABLE: Primary | ICD-10-CM

## 2022-01-21 PROCEDURE — 99423 OL DIG E/M SVC 21+ MIN: CPT | Performed by: FAMILY MEDICINE

## 2022-01-21 NOTE — LETTER
January 21, 2022      Deni Simental  [address] [facility] [address]        To Whom It May Concern:    Deni Simental is being treated for a medical condition. She may return to work with the following: may not work overtime and may only work a 5 day work week starting 01/21/22 and ending 03/04/2022.      Sincerely,        Radha Lay MD

## 2022-02-16 ENCOUNTER — OFFICE VISIT (OUTPATIENT)
Dept: NEUROLOGY | Facility: CLINIC | Age: 42
End: 2022-02-16
Attending: FAMILY MEDICINE
Payer: COMMERCIAL

## 2022-02-16 VITALS
HEART RATE: 85 BPM | SYSTOLIC BLOOD PRESSURE: 123 MMHG | WEIGHT: 220 LBS | DIASTOLIC BLOOD PRESSURE: 83 MMHG | BODY MASS INDEX: 37.18 KG/M2

## 2022-02-16 DIAGNOSIS — G43.109 MIGRAINE WITH AURA AND WITHOUT STATUS MIGRAINOSUS, NOT INTRACTABLE: ICD-10-CM

## 2022-02-16 DIAGNOSIS — G47.8 NON-RESTORATIVE SLEEP: ICD-10-CM

## 2022-02-16 DIAGNOSIS — R06.83 SNORING: Primary | ICD-10-CM

## 2022-02-16 PROCEDURE — 99204 OFFICE O/P NEW MOD 45 MIN: CPT | Performed by: STUDENT IN AN ORGANIZED HEALTH CARE EDUCATION/TRAINING PROGRAM

## 2022-02-16 RX ORDER — TOPIRAMATE 25 MG/1
25 TABLET, FILM COATED ORAL 2 TIMES DAILY
Qty: 28 TABLET | Refills: 0 | Status: SHIPPED | OUTPATIENT
Start: 2022-02-16 | End: 2022-04-07

## 2022-02-16 RX ORDER — TOPIRAMATE 50 MG/1
50 TABLET, FILM COATED ORAL 2 TIMES DAILY
Qty: 60 TABLET | Refills: 4 | Status: SHIPPED | OUTPATIENT
Start: 2022-02-16 | End: 2022-02-17

## 2022-02-16 ASSESSMENT — PAIN SCALES - GENERAL: PAINLEVEL: MILD PAIN (2)

## 2022-02-16 NOTE — PROGRESS NOTES
"[facility]  Section of General Neurology  New Patient Visit      Deni Simental MRN# [id number]   Age: 41 year old YOB: 1980     Requesting physician: Radha Jauregui     Reason for Consultation: Headaches        History of Presenting Symptoms:   Deni Simental is a 41 year old female who presents today for evaluation of headache.  She has a hx of ADD, poor sleep.    Headache specific questions:  First migraine since first grade  Has had tension overlay as well (frontal)  7 years ago she finally went in   Location (unilateral/whole head/etc): usually will start in her eye, can get under control at times, but will migrate to neck and shoulders  Frequency--once a week. +daily tension headaches  Duration:  Provoking factors  Visual changes--will see birds/bees at times,   Nausea/vomiting: both   Sensitivity to light/sound: both   Liquid intake:  Drinks a 1/2 gallon of water a day, at least.    Sleep (including ADALID screen)--poor sleep, fitbit says overall \"fair\"  ~7 hours.   notes apneic episodes, snoring is a factor  Family history of headaches--none that she is aware of, but doesn't keep up with mom's side  Mood/Stress--not a big issue  Caffeine--3 diet cokes in the AM  Medication overuse screen-- yes as below  Previous medications tried: nothing else daily   Prophylactic:  Currently on nortriptyline 50 mg at bedtime--she does think it's helping to some degree but waning, before this medication was at 4-5 migraines a month now back up 5.  maxalt isn't helping, previous different triptan didn't help.  OTC medications don't help a great deal,  She is taking excedrin migraine daily  Previous imaging--none    She lives in [place]  She is a      Not taking vyvanse currently.   Has not tried adderral    They are done having kids  She is not on an OCP  She has 2 kids.      She does have asthma, more exercise induced      Past Medical " History:     Patient Active Problem List   Diagnosis     CARDIOVASCULAR SCREENING; LDL GOAL LESS THAN 160     Migraine with aura and without status migrainosus, not intractable     Hashimoto's thyroiditis     Thyroglobulin antibody positive     Alopecia     Fatigue, unspecified type     Poor sleep hygiene     Thyroid nodule     Attention deficit disorder (ADD) in adult     Past Medical History:   Diagnosis Date     Allergy     multiple     Ankle sprain 4/10     Arthritis      Depression     in teens -      Hashimoto's thyroiditis      IUD (intrauterine device) in place     mirena 05     Uncomplicated asthma         Past Surgical History:     Past Surgical History:   Procedure Laterality Date     HC TOOTH EXTRACTION W/FORCEP          Social History:     Social History     Tobacco Use     Smoking status: Former Smoker     Packs/day: 0.00     Years: 0.00     Pack years: 0.00     Types: Cigarettes     Quit date: 2011     Years since quitting: 10.4     Smokeless tobacco: Never Used     Tobacco comment: Lives in smoke free household   Vaping Use     Vaping Use: Never used   Substance Use Topics     Alcohol use: No     Alcohol/week: 0.0 standard drinks     Drug use: No        Family History:     Family History   Problem Relation Age of Onset     Alcohol/Drug Mother          MVA alcohol related     Diabetes Father         hypoglycemia     Hypertension Father      Psoriatic Arthritis Father      Cancer Paternal Grandfather         lung cancer     Hypothyroidism Paternal Grandmother      Graves' disease Sister         Graves, tumor     C.A.D. No family hx of      Depression No family hx of      Gastrointestinal Disease No family hx of      Heart Disease No family hx of      Lipids No family hx of      Neurologic Disorder No family hx of      Respiratory No family hx of      Cerebrovascular Disease No family hx of      Glaucoma No family hx of      Macular Degeneration No family hx of         Medications:      Current Outpatient Medications   Medication Sig     albuterol (PROAIR HFA/PROVENTIL HFA/VENTOLIN HFA) 108 (90 Base) MCG/ACT inhaler Inhale 1-2 puffs into the lungs as needed     albuterol (PROVENTIL) (2.5 MG/3ML) 0.083% neb solution Inhale 2.5 mg into the lungs as needed     levothyroxine (SYNTHROID/LEVOTHROID) 25 MCG tablet Take 1 tablet (25 mcg) by mouth daily     nortriptyline (PAMELOR) 50 MG capsule Take 1 capsule (50 mg) by mouth At Bedtime     rizatriptan (MAXALT) 5 MG tablet Take 1 tablet (5 mg) by mouth at onset of headache for migraine May repeat in 2 hours. Max 6 tablets/24 hours.     ARMOUR THYROID 15 MG tablet TAKE 1 TABLET BY MOUTH TWICE A DAY     guaiFENesin-codeine (CHERATUSSIN AC) 100-10 MG/5ML solution Take 5-10 mLs by mouth every 4 hours as needed for cough     lisdexamfetamine (VYVANSE) 30 MG capsule Take 1 capsule (30 mg) by mouth every morning     No current facility-administered medications for this visit.        Allergies:     Allergies   Allergen Reactions     Wasp Venom Hives     Swelling past two joints     [place] Flavor Hives     Red Dye Hives     Retin-A [Tretinoin] Hives        Review of Systems:   As noted above     Physical Exam:   Vitals: /83 (BP Location: Right arm, Patient Position: Sitting, Cuff Size: Adult Regular)   Pulse 85   Wt 99.8 kg (220 lb)   BMI 37.18 kg/m     CV: peripheral pulse appreciated  Lungs: breathing comfortably  Extremities: no edema    Neuro:   General Appearance: No apparent distress, well-nourished, well-groomed, pleasant     Mental Status: Alert and oriented to person, place, and time. Speech fluent and comprehension intact. No dysarthria.     Fundoscopic Exam: Optic discs sharp without evidence of papilledema bilaterally    Cranial Nerves:   II: Visual fields: normal  III: Pupils: 3 mm, equal, round, reactive to light   III,IV,VI: Extraocular Movements: intact   VII: Facial strength: intact without asymmetry  VIII: Hearing: intact grossly  IX:  Palate: intact   XI: Shoulder shrug: intact  XII: Tongue movement: normal     Motor Exam:   Upper Extremities  Deltoid  Bicep  Tricep  Wrist Extensors  strength Intrinsic Muscles    Right  5  5  5  5 5 5    Left  5  5  5  5 5 5      Lower Extremities  Hip Flexors  Knee Extensors  Knee   Flexors  Dorsi Flexion  Plantar   Flexion    Right  5  5  5  5  5    Left  5  5  5  5  5        Sensory: intact to light touch    Coordination: no dysmetria with finger-to-nose bilaterally    Reflexes: biceps, triceps, brachioradialis, patellar, and ankle jerks 2+ and symmetric. Toes are downgoing bilaterally    Gait: normal casual gait, normal stride length           Assessment and Plan:   Assessment:  Ms. Simental is a 41 year old female w a PMH of ADD who presents in further evaluation for headaches.  She does have both tension and migraine types of headaches to my estimation.  The migraine subtypes are more debilitating currently.  I do think there is currently elements of poor sleep, question of sleep apnea to history, and some degree of medication overuse at this time.  No red flag symptoms to history and exam is normal not necessitating imaging at this time.  Plan as below     Plan:  --Daily Magnesium oxide 400 mg and riboflavin 400 mg (vitamin b2) are options.   --Start Topamax 25 mg BID x2 weeks then go to 50 mg BID, discussed possible side effects  --Continue nortriptyline 50 mg at night, long term we can try to wean her off of this or topiramate potentially in the long term, will keep nortriptyline right now as is helping her sleep, unclear if helping migraines currently.  --To decrease Excedrin migraine only 3x a week.  --Nurtec PRN for bad migraines she has failed triptans x2 so hopeful this will help  --Sleep referral to evaluate for possible ADALID  --Follow up in ~3 months she will call or mychart in the mean time.           Charles Vega MD   of Neurology   [facility]  [place]      The total time of this encounter today amounted to 48 minutes. This time included time spent with the patient, prep work, ordering tests, and performing post visit documentation.

## 2022-02-16 NOTE — PATIENT INSTRUCTIONS
Magnesium oxide 400 mg and riboflavin 400 mg (vitamin b2) are options.    --Start Topamax 25 mg BID x2 weeks then go to 50 mg BID  Most people tolerate topamax well.  We discussed these side effects/considerations including but not limited to: to stay well hydrated (kidney stones a possible side effect), peripheral tingling, brain fog. funny taste, weight loss  Continue nortriptyline 50 mg at night, long term we can try to wean you off of this or topiramate potentially   Excedrin migraine only 3x a week.  Nurtec PRN for bad migraines  Sleep referral

## 2022-02-16 NOTE — LETTER
"    2/16/2022         RE: Deni Simental  [address] [address] [address]        Dear Colleague,    Thank you for referring your patient, Deni Simental, to the [facility] [place]. Please see a copy of my visit note below.    [facility]  Section of General Neurology  New Patient Visit      Deni Simental MRN# [id number]   Age: 41 year old YOB: 1980     Requesting physician: Radha Jauregui     Reason for Consultation: Headaches        History of Presenting Symptoms:   Deni Simental is a 41 year old female who presents today for evaluation of headache.  She has a hx of ADD, poor sleep.    Headache specific questions:  First migraine since first grade  Has had tension overlay as well (frontal)  7 years ago she finally went in   Location (unilateral/whole head/etc): usually will start in her eye, can get under control at times, but will migrate to neck and shoulders  Frequency--once a week. +daily tension headaches  Duration:  Provoking factors  Visual changes--will see birds/bees at times,   Nausea/vomiting: both   Sensitivity to light/sound: both   Liquid intake:  Drinks a 1/2 gallon of water a day, at least.    Sleep (including ADALID screen)--poor sleep, lake says overall \"fair\"  ~7 hours.   notes apneic episodes, snoring is a factor  Family history of headaches--none that she is aware of, but doesn't keep up with mom's side  Mood/Stress--not a big issue  Caffeine--3 diet cokes in the AM  Medication overuse screen-- yes as below  Previous medications tried: nothing else daily   Prophylactic:  Currently on nortriptyline 50 mg at bedtime--she does think it's helping to some degree but waning, before this medication was at 4-5 migraines a month now back up 5.  maxalt isn't helping, previous different triptan didn't help.  OTC medications don't help a great deal,  She is taking excedrin " migraine daily  Previous imaging--none    She lives in [place]  She is a      Not taking vyvanse currently.   Has not tried adderral    They are done having kids  She is not on an OCP  She has 2 kids.      She does have asthma, more exercise induced      Past Medical History:     Patient Active Problem List   Diagnosis     CARDIOVASCULAR SCREENING; LDL GOAL LESS THAN 160     Migraine with aura and without status migrainosus, not intractable     Hashimoto's thyroiditis     Thyroglobulin antibody positive     Alopecia     Fatigue, unspecified type     Poor sleep hygiene     Thyroid nodule     Attention deficit disorder (ADD) in adult     Past Medical History:   Diagnosis Date     Allergy     multiple     Ankle sprain 4/10     Arthritis      Depression     in teens -      Hashimoto's thyroiditis      IUD (intrauterine device) in place     mirena 05     Uncomplicated asthma         Past Surgical History:     Past Surgical History:   Procedure Laterality Date     HC TOOTH EXTRACTION W/FORCEP          Social History:     Social History     Tobacco Use     Smoking status: Former Smoker     Packs/day: 0.00     Years: 0.00     Pack years: 0.00     Types: Cigarettes     Quit date: 2011     Years since quitting: 10.4     Smokeless tobacco: Never Used     Tobacco comment: Lives in smoke free household   Vaping Use     Vaping Use: Never used   Substance Use Topics     Alcohol use: No     Alcohol/week: 0.0 standard drinks     Drug use: No        Family History:     Family History   Problem Relation Age of Onset     Alcohol/Drug Mother          MVA alcohol related     Diabetes Father         hypoglycemia     Hypertension Father      Psoriatic Arthritis Father      Cancer Paternal Grandfather         lung cancer     Hypothyroidism Paternal Grandmother      Graves' disease Sister         Graves, tumor     C.A.D. No family hx of      Depression No family hx of      Gastrointestinal Disease No family hx of       Heart Disease No family hx of      Lipids No family hx of      Neurologic Disorder No family hx of      Respiratory No family hx of      Cerebrovascular Disease No family hx of      Glaucoma No family hx of      Macular Degeneration No family hx of         Medications:     Current Outpatient Medications   Medication Sig     albuterol (PROAIR HFA/PROVENTIL HFA/VENTOLIN HFA) 108 (90 Base) MCG/ACT inhaler Inhale 1-2 puffs into the lungs as needed     albuterol (PROVENTIL) (2.5 MG/3ML) 0.083% neb solution Inhale 2.5 mg into the lungs as needed     levothyroxine (SYNTHROID/LEVOTHROID) 25 MCG tablet Take 1 tablet (25 mcg) by mouth daily     nortriptyline (PAMELOR) 50 MG capsule Take 1 capsule (50 mg) by mouth At Bedtime     rizatriptan (MAXALT) 5 MG tablet Take 1 tablet (5 mg) by mouth at onset of headache for migraine May repeat in 2 hours. Max 6 tablets/24 hours.     ARMOUR THYROID 15 MG tablet TAKE 1 TABLET BY MOUTH TWICE A DAY     guaiFENesin-codeine (CHERATUSSIN AC) 100-10 MG/5ML solution Take 5-10 mLs by mouth every 4 hours as needed for cough     lisdexamfetamine (VYVANSE) 30 MG capsule Take 1 capsule (30 mg) by mouth every morning     No current facility-administered medications for this visit.        Allergies:     Allergies   Allergen Reactions     Wasp Venom Hives     Swelling past two joints     [place] Flavor Hives     Red Dye Hives     Retin-A [Tretinoin] Hives        Review of Systems:   As noted above     Physical Exam:   Vitals: /83 (BP Location: Right arm, Patient Position: Sitting, Cuff Size: Adult Regular)   Pulse 85   Wt 99.8 kg (220 lb)   BMI 37.18 kg/m     CV: peripheral pulse appreciated  Lungs: breathing comfortably  Extremities: no edema    Neuro:   General Appearance: No apparent distress, well-nourished, well-groomed, pleasant     Mental Status: Alert and oriented to person, place, and time. Speech fluent and comprehension intact. No dysarthria.     Fundoscopic Exam: Optic discs  sharp without evidence of papilledema bilaterally    Cranial Nerves:   II: Visual fields: normal  III: Pupils: 3 mm, equal, round, reactive to light   III,IV,VI: Extraocular Movements: intact   VII: Facial strength: intact without asymmetry  VIII: Hearing: intact grossly  IX: Palate: intact   XI: Shoulder shrug: intact  XII: Tongue movement: normal     Motor Exam:   Upper Extremities  Deltoid  Bicep  Tricep  Wrist Extensors  strength Intrinsic Muscles    Right  5  5  5  5 5 5    Left  5  5  5  5 5 5      Lower Extremities  Hip Flexors  Knee Extensors  Knee   Flexors  Dorsi Flexion  Plantar   Flexion    Right  5  5  5  5  5    Left  5  5  5  5  5        Sensory: intact to light touch    Coordination: no dysmetria with finger-to-nose bilaterally    Reflexes: biceps, triceps, brachioradialis, patellar, and ankle jerks 2+ and symmetric. Toes are downgoing bilaterally    Gait: normal casual gait, normal stride length           Assessment and Plan:   Assessment:  Ms. Simental is a 41 year old female w a PMH of ADD who presents in further evaluation for headaches.  She does have both tension and migraine types of headaches to my estimation.  The migraine subtypes are more debilitating currently.  I do think there is currently elements of poor sleep, question of sleep apnea to history, and some degree of medication overuse at this time.  No red flag symptoms to history and exam is normal not necessitating imaging at this time.  Plan as below     Plan:  --Daily Magnesium oxide 400 mg and riboflavin 400 mg (vitamin b2) are options.   --Start Topamax 25 mg BID x2 weeks then go to 50 mg BID, discussed possible side effects  --Continue nortriptyline 50 mg at night, long term we can try to wean her off of this or topiramate potentially in the long term, will keep nortriptyline right now as is helping her sleep, unclear if helping migraines currently.  --To decrease Excedrin migraine only 3x a week.  [facility]  migraines she has failed triptans x2 so hopeful this will help  --Sleep referral to evaluate for possible ADALID  --Follow up in ~3 months she will call or mychart in the mean time.           Charles Vega MD   of Neurology   [facility]      The total time of this encounter today amounted to 48 minutes. This time included time spent with the patient, prep work, ordering tests, and performing post visit documentation.        Again, thank you for allowing me to participate in the care of your patient.        Sincerely,        Lev Vega MD

## 2022-02-17 DIAGNOSIS — G43.109 MIGRAINE WITH AURA AND WITHOUT STATUS MIGRAINOSUS, NOT INTRACTABLE: ICD-10-CM

## 2022-02-17 RX ORDER — TOPIRAMATE 50 MG/1
50 TABLET, FILM COATED ORAL 2 TIMES DAILY
Qty: 180 TABLET | Refills: 1 | Status: SHIPPED | OUTPATIENT
Start: 2022-02-17 | End: 2022-05-19

## 2022-02-28 ENCOUNTER — MYC MEDICAL ADVICE (OUTPATIENT)
Dept: FAMILY MEDICINE | Facility: CLINIC | Age: 42
End: 2022-02-28
Payer: COMMERCIAL

## 2022-03-03 NOTE — TELEPHONE ENCOUNTER
Patient dropped off FMLA forms at the .  I placed the forms in the providers folder for review.  Marlee Grimm,  [facility]

## 2022-03-04 NOTE — TELEPHONE ENCOUNTER
Mailed form in the [facility]. Faxed a copy to stat scanning.Kelley Mccain MA/CANDIS

## 2022-04-06 NOTE — PROGRESS NOTES
Does Deni have a CPAP/Bipap?  No     [place] Sleep Scale:5    Deni is a 41 year old who is being evaluated via a billable video visit.      How would you like to obtain your AVS? MyChart  If the video visit is dropped, the invitation should be resent by: Text to cell phone: [phone]  Will anyone else be joining your video visit? No      Video Start Time: 13:35        Subjective   Deni is a 41 year old who presents for the following health issues Vitals:  No vitals were obtained today due to virtual visit.        Video-Visit Details    Type of service:  Video Visit    Video End Time: 13:55    Originating Location (pt. Location): Home    Distant Location (provider location):  [facility]     Platform used for Video Visit: [facility]             Outpatient Sleep Medicine Consultation:  April 6, 2022    Name: Deni Simental MRN# [id number]   Age: 41 year old YOB: 1980     Date of Consultation: April 6, 2022  Consultation is requested by: Lev Vega MD  [address] [place] [address] Lev Vega  Primary care provider: Radha Lay       Reason for Sleep Consult:     Bonitaplacido EspinalSimental is sent by Lev Vega for a sleep consultation regarding possible sleep disordered breathing.    Patient s Reason for visit: Patient with history of chronic migraines and constantly feels unrefreshed during the day.  Her migraines especially worse in the mornings.  The difficulty treat.  There is concerned that patient sleep disordered breathing contributing to and exacerbating her migraines.  Deni Simental main reason for visit:    Patient states problem(s) started: Many years ago  Deni Simental's goals for this visit: To try and address and resolve those issues    Patient goes to bed usually at 10 PM.  Takes about 15 minutes to fall asleep.  She is up at around 5:45 in the morning with alarm.  She gets up maybe once at night.  On the  weekends she goes to sleep between 1030 and 11 PM getting up at between 8 and 9 AM without alarm.  She gets at least 6 to 8 hours of sleep.  She snores loudly.  There have been witnessed apneas and patient wakes up gasping sometimes.  Patient denies any history of parasomnias at night no restless legs urged to move her legs noted.  No significant tossing and turning noted.  She does have some nasal congestion in his allergies uses Claritin.  1 is a mouth breather with dry mouth in the morning.  Denies any family history of obstructive sleep apnea.  As a child does not have any sleep-related issues.       Assessment and Plan:     Summary Sleep Diagnoses:  Patient with a chronic snoring, witnessed apneas, gasping, some chronic fatigue issues even though [place] is within normal range, exacerbation of her migraines especially in the mornings.  Patient certainly needs further evaluation for sleep disordered breathing and with the stop bang score of 3 would be a good candidate for home sleep study    Comorbid Diagnoses:  migraines      Summary Recommendations:  We will obtain home sleep study  No orders of the defined types were placed in this encounter.        Summary Counseling:    Sleep Testing Reviewed  Obstructive Sleep Apnea Reviewed  Complications of Untreated Sleep Apnea Reviewed      Medical Decision-making:   Educational materials provided in instructions    Total time spent reviewing medical records, history and physical examination, review of previous testing and interpretation as well as documentation on this date:27    CC: Lev Vega          History of Present Illness:     Past Sleep Evaluations:    SLEEP-WAKE SCHEDULE:     Work/School Days: Refer to above information  Weekends/Non-work Days/All Other Days:  Refer to above information  Sleep Need  Refer to above information    Deni Simental prefers to sleep in this position(s):   Back and sides  Patient states they do the following activities in  bed: Patient does not wish to be in bed does not use electronics in bed.    Naps  Patient takes a purposeful nap 1-2 times a a year and naps are usually 20 to 30 minutes in duration  She feels better after a nap: Yes  She dozes off unintentionally   never  days per week  Patient has had a driving accident or near-miss due to sleepiness/drowsiness: Never      SLEEP DISRUPTIONS:    Breathing/Snoring  Refer to above information     movement:  Patient gets pain, discomfort, with an urge to move:   Number  It happens when she is resting:   Never  It happens more at night:     Patient has been told she kicks her legs at night:   Never     Behaviors in Sleep:  Deni Simental has experienced the following behaviors while sleeping: None  She has experienced sudden muscle weakness during the day: None      Is there anything else you would like your sleep provider to know:      None    CAFFEINE AND OTHER SUBSTANCES:    Patient consumes caffeinated beverages per day:   2-3 diet Cokes per day until about 3 and 4 PM.  Occasional couple cups of coffee a week  Last caffeine use is usually: Between 3 and 4 PM  Family History:  Patient does not have a family member been diagnosed with a sleep disorder:              SCALES:    EPWORTH SLEEPINESS SCALE 8    No flowsheet data found.      INSOMNIA SEVERITY INDEX (RENEE)      No flowsheet data found.    Guidelines for Scoring/Interpretation:    Total score categories:  0-7 = No clinically significant insomnia   8-14 = Subthreshold insomnia   15-21 = Clinical insomnia (moderate severity)  22-28 = Clinical insomnia (severe)    Used via courtesy of www.Karmaloopealth.va.gov with permission from Boubacar Mendoza PhD., [facility]      STOP BANG     STOP BANG Questionnaire (  2008, the American Society of Anesthesiologists, Inc. Andrea Irwin & Sol[employer] Inc.) 2/16/2022   B/P Clinic: 123/83   BMI Clinic: -         GAD7    No flowsheet data found.      CAGE-AID    No flowsheet data  "found.    CAGE-AID reprinted with permission from the [place] Medical Journal, SALONI Bejarano. and SHAHRAM Miller, \"Conjoint screening questionnaires for alcohol and drug abuse\" [place] Medical Journal 94: 135-140, 1995.      PATIENT HEALTH QUESTIONNAIRE-9 (PHQ - 9)    PHQ-9 ([employer]) 9/14/2016   No Interest In Doing Things -   Feeling Depressed -   Trouble Sleeping -   Tired / No Energy -   No appetite or Over-Eating -   Feeling Bad about Self -   Trouble Concentrating -   Moving Slow or Restless -   Suicidal Thoughts -   Total Score -   1.  Little interest or pleasure in doing things 2   2.  Feeling down, depressed, or hopeless 1   3.  Trouble falling or staying asleep, or sleeping too much 3   4.  Feeling tired or having little energy 3   5.  Poor appetite or overeating 1   6.  Feeling bad about yourself 0   7.  Trouble concentrating 3   8.  Moving slowly or restless 0   9.  Suicidal or self-harm thoughts 0   PHQ-9 Total Score 13   Difficulty at work, home, or with people Extremely dIfficult       Developed by Keyonna Pyane, La Gayle, Chavez Mcfarlane and colleagues, with an educational elsie from [employer] No permission required to reproduce, translate, display or distribute.        Allergies:    Allergies   Allergen Reactions     Wasp Venom Hives     Swelling past two joints     [place] Flavor Hives     Red Dye Hives     Retin-A [Tretinoin] Hives       Medications:    Current Outpatient Medications   Medication Sig Dispense Refill     albuterol (PROAIR HFA/PROVENTIL HFA/VENTOLIN HFA) 108 (90 Base) MCG/ACT inhaler Inhale 1-2 puffs into the lungs as needed       albuterol (PROVENTIL) (2.5 MG/3ML) 0.083% neb solution Inhale 2.5 mg into the lungs as needed       levothyroxine (SYNTHROID/LEVOTHROID) 25 MCG tablet Take 1 tablet (25 mcg) by mouth daily 90 tablet 1     lisdexamfetamine (VYVANSE) 30 MG capsule Take 1 capsule (30 mg) by mouth every morning 30 capsule 0     nortriptyline (PAMELOR) 50 MG capsule " Take 1 capsule (50 mg) by mouth At Bedtime 90 capsule 1     Rimegepant Sulfate 75 MG TBDP Take 1 tablet by mouth daily as needed (headache) 9 tablet 4     rizatriptan (MAXALT) 5 MG tablet Take 1 tablet (5 mg) by mouth at onset of headache for migraine May repeat in 2 hours. Max 6 tablets/24 hours. 12 tablet 4     topiramate (TOPAMAX) 25 MG tablet Take 1 tablet (25 mg) by mouth 2 times daily 28 tablet 0     topiramate (TOPAMAX) 50 MG tablet Take 1 tablet (50 mg) by mouth 2 times daily 180 tablet 1       Problem List:  Patient Active Problem List    Diagnosis Date Noted     Attention deficit disorder (ADD) in adult 11/02/2021     Priority: Medium     Thyroglobulin antibody positive 06/10/2019     Priority: Medium     Alopecia 06/10/2019     Priority: Medium     Fatigue, unspecified type 06/10/2019     Priority: Medium     Poor sleep hygiene 06/10/2019     Priority: Medium     Thyroid nodule 06/10/2019     Priority: Medium     Hashimoto's thyroiditis 11/22/2017     Priority: Medium     Thyroglobulin AB 6.9 9/17 (nl <0.9)    Conversion from armour 15mg to levothyroxine 25 mg         Migraine with aura and without status migrainosus, not intractable 09/26/2017     Priority: Medium     CARDIOVASCULAR SCREENING; LDL GOAL LESS THAN 160 10/31/2010     Priority: Medium        Past Medical/Surgical History:  Past Medical History:   Diagnosis Date     Allergy     multiple     Ankle sprain 4/10     Arthritis      Depression     in teens -      Hashimoto's thyroiditis      IUD (intrauterine device) in place     mirena 6/20/05     Uncomplicated asthma      Past Surgical History:   Procedure Laterality Date     HC TOOTH EXTRACTION W/FORCEP         Social History:  Social History     Socioeconomic History     Marital status:      Spouse name: Marcello     Number of children: 2     Years of education: Not on file     Highest education level: Not on file   Occupational History     Not on file   Tobacco Use     Smoking status:  Former Smoker     Packs/day: 0.00     Years: 0.00     Pack years: 0.00     Types: Cigarettes     Quit date: 2011     Years since quitting: 10.5     Smokeless tobacco: Never Used     Tobacco comment: Lives in smoke free household   Vaping Use     Vaping Use: Never used   Substance and Sexual Activity     Alcohol use: No     Alcohol/week: 0.0 standard drinks     Drug use: No     Sexual activity: Yes     Partners: Male     Birth control/protection: Condom   Other Topics Concern     Parent/sibling w/ CABG, MI or angioplasty before 65F 55M? No   Social History Narrative     Not on file     Social Determinants of Health     Financial Resource Strain: Not on file   Food Insecurity: Not on file   Transportation Needs: Not on file   Physical Activity: Not on file   Stress: Not on file   Social Connections: Not on file   Intimate Partner Violence: Not on file   Housing Stability: Not on file       Family History:  Family History   Problem Relation Age of Onset     Alcohol/Drug Mother          MVA alcohol related     Diabetes Father         hypoglycemia     Hypertension Father      Psoriatic Arthritis Father      Cancer Paternal Grandfather         lung cancer     Hypothyroidism Paternal Grandmother      Graves' disease Sister         Graves, tumor     C.A.D. No family hx of      Depression No family hx of      Gastrointestinal Disease No family hx of      Heart Disease No family hx of      Lipids No family hx of      Neurologic Disorder No family hx of      Respiratory No family hx of      Cerebrovascular Disease No family hx of      Glaucoma No family hx of      Macular Degeneration No family hx of        Review of Systems:  A complete review of systems reviewed by me is negative with the exeption of what has been mentioned in the history of present illness.        Physical Examination:  Vitals: There were no vitals taken for this visit.  BMI= There is no height or weight on file to calculate BMI.           GENERAL  APPEARANCE: healthy, alert, no distress and cooperative           Data: All pertinent previous laboratory data reviewed     Recent Labs   Lab Test 06/30/20  1056 02/04/19  1106   NA  --  141   POTASSIUM  --  4.1   CHLORIDE  --  107   CO2  --  28   ANIONGAP  --  6   * 78   BUN  --  5*   CR  --  0.72   SHARI  --  8.5       Recent Labs   Lab Test 09/10/15  0911   WBC 9.7   RBC 4.20   HGB 12.6   HCT 37.5   MCV 89   MCH 30.0   MCHC 33.6   RDW 12.3          Recent Labs   Lab Test 02/04/19  1106   PROTTOTAL 7.7   ALBUMIN 4.2   BILITOTAL 0.4   ALKPHOS 67   AST 10   ALT 20       TSH (mU/L)   Date Value   07/29/2021 1.42   06/30/2020 1.38   02/17/2020 0.89       No results found for: UAMP, UBARB, BENZODIAZEUR, UCANN, UCOC, OPIT, UPCP    Ferritin   Date/Time Value Ref Range Status   06/30/2020 10:56 AM 25 12 - 150 ng/mL Final       No results found for: PH, PHARTERIAL, PO2, [id number], SAT, PCO2, HCO3, BASEEXCESS, KEENA, BEB    @LABRCNTIPR(phv:4,pco2v:4,po2v:4,hco3v:4,addie:4,o2per:4)@    Echocardiology: No results found for this or any previous visit (from the past 4320 hour(s)).    Chest x-ray: No results found for this or any previous visit from the past 365 days.      Chest CT: No results found for this or any previous visit from the past 365 days.      PFT: Most Recent [facility] Pulmonary Function Testing    No results found for: 20001  No results found for: 20002  No results found for: 20003  No results found for: 20015  No results found for: 20016  No results found for: 20027  No results found for: 20028  No results found for: 20029  No results found for: 20079  No results found for: 20080  No results found for: 20081  No results found for: 20335  No results found for: 20105  No results found for: 20053  No results found for: 20054  No results found for: 20055      Deanna Wright, CMA 4/6/2022

## 2022-04-07 ENCOUNTER — VIRTUAL VISIT (OUTPATIENT)
Dept: SLEEP MEDICINE | Facility: CLINIC | Age: 42
End: 2022-04-07
Attending: STUDENT IN AN ORGANIZED HEALTH CARE EDUCATION/TRAINING PROGRAM
Payer: COMMERCIAL

## 2022-04-07 DIAGNOSIS — G47.8 NON-RESTORATIVE SLEEP: ICD-10-CM

## 2022-04-07 DIAGNOSIS — R06.81 APNEA: Primary | ICD-10-CM

## 2022-04-07 DIAGNOSIS — R06.83 SNORING: ICD-10-CM

## 2022-04-07 PROCEDURE — 99203 OFFICE O/P NEW LOW 30 MIN: CPT | Mod: 95 | Performed by: OTOLARYNGOLOGY

## 2022-04-07 ASSESSMENT — SLEEP AND FATIGUE QUESTIONNAIRES
HOW LIKELY ARE YOU TO NOD OFF OR FALL ASLEEP IN A CAR, WHILE STOPPED FOR A FEW MINUTES IN TRAFFIC: WOULD NEVER DOZE
HOW LIKELY ARE YOU TO NOD OFF OR FALL ASLEEP WHILE WATCHING TV: SLIGHT CHANCE OF DOZING
HOW LIKELY ARE YOU TO NOD OFF OR FALL ASLEEP WHILE SITTING INACTIVE IN A PUBLIC PLACE: SLIGHT CHANCE OF DOZING
HOW LIKELY ARE YOU TO NOD OFF OR FALL ASLEEP WHILE SITTING AND TALKING TO SOMEONE: WOULD NEVER DOZE
HOW LIKELY ARE YOU TO NOD OFF OR FALL ASLEEP WHILE SITTING QUIETLY AFTER LUNCH WITHOUT ALCOHOL: SLIGHT CHANCE OF DOZING
HOW LIKELY ARE YOU TO NOD OFF OR FALL ASLEEP WHILE SITTING AND READING: SLIGHT CHANCE OF DOZING
HOW LIKELY ARE YOU TO NOD OFF OR FALL ASLEEP WHEN YOU ARE A PASSENGER IN A CAR FOR AN HOUR WITHOUT A BREAK: WOULD NEVER DOZE
HOW LIKELY ARE YOU TO NOD OFF OR FALL ASLEEP WHILE LYING DOWN TO REST IN THE AFTERNOON WHEN CIRCUMSTANCES PERMIT: SLIGHT CHANCE OF DOZING

## 2022-04-15 ENCOUNTER — LAB (OUTPATIENT)
Dept: LAB | Facility: CLINIC | Age: 42
End: 2022-04-15
Payer: COMMERCIAL

## 2022-04-15 DIAGNOSIS — E06.3 HASHIMOTO'S THYROIDITIS: ICD-10-CM

## 2022-04-15 LAB — TSH SERPL DL<=0.005 MIU/L-ACNC: 1.48 MU/L (ref 0.4–4)

## 2022-04-15 PROCEDURE — 84443 ASSAY THYROID STIM HORMONE: CPT

## 2022-04-15 PROCEDURE — 36415 COLL VENOUS BLD VENIPUNCTURE: CPT

## 2022-04-18 ENCOUNTER — MYC MEDICAL ADVICE (OUTPATIENT)
Dept: FAMILY MEDICINE | Facility: CLINIC | Age: 42
End: 2022-04-18
Payer: COMMERCIAL

## 2022-04-18 NOTE — TELEPHONE ENCOUNTER
Patient responding to the below Result Note:    [facility],  Your thyroid is normal. You are on such a small dose it would be reasonable to try taking you off to see if you truly need it.  Please let me know your thoughts,   Radha Lay MD    Routing to provider to advise.  Regina Biggs BSN, RN

## 2022-04-25 ENCOUNTER — MYC MEDICAL ADVICE (OUTPATIENT)
Dept: FAMILY MEDICINE | Facility: CLINIC | Age: 42
End: 2022-04-25
Payer: COMMERCIAL

## 2022-04-25 DIAGNOSIS — F90.9 ADULT ADHD: Primary | ICD-10-CM

## 2022-04-25 RX ORDER — DEXMETHYLPHENIDATE HYDROCHLORIDE 15 MG/1
15 CAPSULE, EXTENDED RELEASE ORAL DAILY
Qty: 30 CAPSULE | Refills: 0 | Status: SHIPPED | OUTPATIENT
Start: 2022-04-25 | End: 2022-06-22 | Stop reason: SINTOL

## 2022-04-27 DIAGNOSIS — G43.109 MIGRAINE WITH AURA AND WITHOUT STATUS MIGRAINOSUS, NOT INTRACTABLE: ICD-10-CM

## 2022-04-27 NOTE — TELEPHONE ENCOUNTER
Writer received a refill request from: [facility] in [place].     Pending Prescriptions:                       Disp   Refills    Rimegepant Sulfate 75 MG TBDP             9 tabl*4            Sig: Take 1 tablet by mouth daily as needed (headache)            Pt's last office visit: 2/16/22  Next scheduled office visit: 5/19/22      Per the RN/LPN medication refill protocol, writer is unable to refill this request.

## 2022-04-27 NOTE — TELEPHONE ENCOUNTER
Rimegepant Sulfate 75 MG TBDP  Last filled: 2/16/22  Disp: 9 tabs  Refills: 4    Routed to Dr Vega to review. RX pending.    Virginia Henderson RN   Neurology Care Coordinator

## 2022-04-30 DIAGNOSIS — Z79.899 HIGH RISK MEDICATIONS (NOT ANTICOAGULANTS) LONG-TERM USE: Primary | ICD-10-CM

## 2022-04-30 DIAGNOSIS — G43.109 MIGRAINE WITH AURA AND WITHOUT STATUS MIGRAINOSUS, NOT INTRACTABLE: ICD-10-CM

## 2022-05-01 RX ORDER — NORTRIPTYLINE HYDROCHLORIDE 50 MG/1
CAPSULE ORAL
Qty: 90 CAPSULE | Refills: 0 | Status: SHIPPED | OUTPATIENT
Start: 2022-05-01 | End: 2022-05-19

## 2022-05-07 ENCOUNTER — HEALTH MAINTENANCE LETTER (OUTPATIENT)
Age: 42
End: 2022-05-07

## 2022-05-12 ENCOUNTER — E-VISIT (OUTPATIENT)
Dept: FAMILY MEDICINE | Facility: CLINIC | Age: 42
End: 2022-05-12
Payer: COMMERCIAL

## 2022-05-12 DIAGNOSIS — U07.1 INFECTION DUE TO 2019 NOVEL CORONAVIRUS: Primary | ICD-10-CM

## 2022-05-12 PROCEDURE — 99421 OL DIG E/M SVC 5-10 MIN: CPT | Performed by: FAMILY MEDICINE

## 2022-05-12 NOTE — LETTER
May 12, 2022      Deni GARAY Hola  [address] [address] [address]        To Whom It May Concern:    Deni Simental was assessed on 05/12/22.  Please excuse her  until 5/14/22 due to illness.        Sincerely,        Radha Lay MD

## 2022-05-19 ENCOUNTER — VIRTUAL VISIT (OUTPATIENT)
Dept: NEUROLOGY | Facility: CLINIC | Age: 42
End: 2022-05-19
Payer: COMMERCIAL

## 2022-05-19 DIAGNOSIS — G43.109 MIGRAINE WITH AURA AND WITHOUT STATUS MIGRAINOSUS, NOT INTRACTABLE: Primary | ICD-10-CM

## 2022-05-19 PROCEDURE — 99213 OFFICE O/P EST LOW 20 MIN: CPT | Mod: 95 | Performed by: STUDENT IN AN ORGANIZED HEALTH CARE EDUCATION/TRAINING PROGRAM

## 2022-05-19 RX ORDER — NORTRIPTYLINE HYDROCHLORIDE 50 MG/1
CAPSULE ORAL
Qty: 90 CAPSULE | Refills: 1 | Status: SHIPPED | OUTPATIENT
Start: 2022-05-19 | End: 2022-10-26

## 2022-05-19 RX ORDER — TOPIRAMATE 50 MG/1
50 TABLET, FILM COATED ORAL 2 TIMES DAILY
Qty: 180 TABLET | Refills: 1 | Status: SHIPPED | OUTPATIENT
Start: 2022-05-19 | End: 2022-10-26

## 2022-05-19 NOTE — PROGRESS NOTES
Deni is a 41 year old who is being evaluated via a billable video visit.      How would you like to obtain your AVS? MyChart  If the video visit is dropped, the invitation should be resent by: Text to cell phone: [phone]  Will anyone else be joining your video visit? No      Video Start Time:   Video-Visit Details    Type of service:  Video Visit    Video End Time:    Originating Location (pt. Location):     Distant Location (provider location):  [facility] [place]     Platform used for Video Visit:   ELPIDIO Carreno, GINA ([facility])

## 2022-05-19 NOTE — PROGRESS NOTES
[facility]  Section of General Neurology  Return Patient  Virtual Visit    Deni Simental MRN# [id number]   Age: 41 year old YOB: 1980         Interval history:   Topiramate--tolerating this well  Remains on nortriptyline at night as well  Mag/riboflavin--no issues, is taking  Nurtec PRN  Home sleep study/sleep: Had to reschedule due to getting COVID.  She did well overall but felt terrible x6 days.   There was no difference initially but the month subsequent her headaches were much better.    The nurtec PRN helped a lot.      Has a Neopit terrier named Dulce who I was able to meet today as well (named after a [place] of note).      Past Medical History:     Patient Active Problem List   Diagnosis     CARDIOVASCULAR SCREENING; LDL GOAL LESS THAN 160     Migraine with aura and without status migrainosus, not intractable     Hashimoto's thyroiditis     Thyroglobulin antibody positive     Alopecia     Fatigue, unspecified type     Poor sleep hygiene     Thyroid nodule     Adult ADHD     Past Medical History:   Diagnosis Date     Allergy     multiple     Ankle sprain 4/10     Arthritis      Depression     in teens -      Hashimoto's thyroiditis      IUD (intrauterine device) in place     mirena 6/20/05     Uncomplicated asthma         Past Surgical History:     Past Surgical History:   Procedure Laterality Date     HC TOOTH EXTRACTION W/FORCEP          Social History:     Social History     Tobacco Use     Smoking status: Former Smoker     Packs/day: 0.00     Years: 0.00     Pack years: 0.00     Types: Cigarettes     Quit date: 9/17/2011     Years since quitting: 10.6     Smokeless tobacco: Never Used     Tobacco comment: Lives in smoke free household   Vaping Use     Vaping Use: Never used   Substance Use Topics     Alcohol use: No     Alcohol/week: 0.0 standard drinks     Drug use: No        Family History:     Family History   Problem Relation Age of Onset      Alcohol/Drug Mother          MVA alcohol related     Diabetes Father         hypoglycemia     Hypertension Father      Psoriatic Arthritis Father      Cancer Paternal Grandfather         lung cancer     Hypothyroidism Paternal Grandmother      Graves' disease Sister         Graves, tumor     C.A.D. No family hx of      Depression No family hx of      Gastrointestinal Disease No family hx of      Heart Disease No family hx of      Lipids No family hx of      Neurologic Disorder No family hx of      Respiratory No family hx of      Cerebrovascular Disease No family hx of      Glaucoma No family hx of      Macular Degeneration No family hx of         Medications:     Current Outpatient Medications   Medication Sig     Rimegepant Sulfate 75 MG TBDP Take 1 tablet by mouth daily as needed (headache)     albuterol (PROAIR HFA/PROVENTIL HFA/VENTOLIN HFA) 108 (90 Base) MCG/ACT inhaler Inhale 1-2 puffs into the lungs as needed     albuterol (PROVENTIL) (2.5 MG/3ML) 0.083% neb solution Inhale 2.5 mg into the lungs as needed     dexmethylphenidate (FOCALIN XR) 15 MG 24 hr capsule Take 1 capsule (15 mg) by mouth daily     levothyroxine (SYNTHROID/LEVOTHROID) 25 MCG tablet Take 1 tablet (25 mcg) by mouth daily     nortriptyline (PAMELOR) 50 MG capsule TAKE 1 CAPSULE BY MOUTH AT BEDTIME     topiramate (TOPAMAX) 50 MG tablet Take 1 tablet (50 mg) by mouth 2 times daily     No current facility-administered medications for this visit.        Allergies:     Allergies   Allergen Reactions     Wasp Venom Hives     Swelling past two joints     [place] Flavor Hives     Red Dye Hives     Retin-A [Tretinoin] Hives        Review of Systems:   As noted above     Physical Exam:   General: Seated comfortably in no acute distress.  Neurologic:     Mental Status: Fully alert, attentive and oriented. Speech clear and fluent, no paraphasic errors.     Cranial Nerves: EOM appear intact. Facial movements symmetric. Hearing not formally tested  but intact to conversation.  No dysarthria.     Motor: No tremors or other abnormal movements observed.      Sensory:Not able to be tested virtually           Assessment and Plan:   Assessment:  Deni Simental is a pleasant 41 year old female seen in follow up for headaches.  These are migrainous in nature as previously noted and potential for untreated ADALID as a further provoking cause which she is being evaluated by our sleep colleagues for currently/with sleep study pending.   Her headache control has improved on her current regimen.  She especially notes a big improvement with the nurtec PRN, which is wonderful.     Discussed that  long term/post sleep study we could consider weaning off of some of these medications if she is still doing well.     Plan:  --Continue magnesium, riboflavin daily  --Continue topiramate 50 mg BID--discussed going higher on this, she is content to stay at 50 mg which is reasonable, as well as nortriptyline 50 mg at bedtime  --Nurtec PRN for bad migraine headaches  --Will await further sleep data as well.  --Follow up in ~4-5 months, she will reach out with questions or changes in the meanwhile.                Charles Vega MD   of Neurology   [facility]      The total time of this encounter today amounted to 11 minutes of time on video visit and 21 minutes in total. This time included time spent with the patient, prep work, ordering tests, and performing post visit documentation.

## 2022-05-19 NOTE — LETTER
5/19/2022         RE: Deni Simental  [address] [address]        Dear Colleague,    Thank you for referring your patient, Deni Simental, to the [facility] [place]. Please see a copy of my visit note below.    [facility] [place]  Section of General Neurology  Return Patient  Virtual Visit    Deni Simental MRN# [id number]   Age: 41 year old YOB: 1980         Interval history:   Topiramate--tolerating this well  Remains on nortriptyline at night as well  Mag/riboflavin--no issues, is taking  Nurtec PRN  Home sleep study/sleep: Had to reschedule due to getting COVID.  She did well overall but felt terrible x6 days.   There was no difference initially but the month subsequent her headaches were much better.    The nurtec PRN helped a lot.      Has a [place] terrier named Dulce who I was able to meet today as well (named after a [employer] [place] of note).      Past Medical History:     Patient Active Problem List   Diagnosis     CARDIOVASCULAR SCREENING; LDL GOAL LESS THAN 160     Migraine with aura and without status migrainosus, not intractable     Hashimoto's thyroiditis     Thyroglobulin antibody positive     Alopecia     Fatigue, unspecified type     Poor sleep hygiene     Thyroid nodule     Adult ADHD     Past Medical History:   Diagnosis Date     Allergy     multiple     Ankle sprain 4/10     Arthritis      Depression     in teens -      Hashimoto's thyroiditis      IUD (intrauterine device) in place     mirena 6/20/05     Uncomplicated asthma         Past Surgical History:     Past Surgical History:   Procedure Laterality Date     HC TOOTH EXTRACTION W/FORCEP          Social History:     Social History     Tobacco Use     Smoking status: Former Smoker     Packs/day: 0.00     Years: 0.00     Pack years: 0.00     Types: Cigarettes     Quit date: 9/17/2011     Years since quitting: 10.6     Smokeless tobacco:  Never Used     Tobacco comment: Lives in smoke free household   Vaping Use     Vaping Use: Never used   Substance Use Topics     Alcohol use: No     Alcohol/week: 0.0 standard drinks     Drug use: No        Family History:     Family History   Problem Relation Age of Onset     Alcohol/Drug Mother          MVA alcohol related     Diabetes Father         hypoglycemia     Hypertension Father      Psoriatic Arthritis Father      Cancer Paternal Grandfather         lung cancer     Hypothyroidism Paternal Grandmother      Graves' disease Sister         Graves, tumor     C.A.D. No family hx of      Depression No family hx of      Gastrointestinal Disease No family hx of      Heart Disease No family hx of      Lipids No family hx of      Neurologic Disorder No family hx of      Respiratory No family hx of      Cerebrovascular Disease No family hx of      Glaucoma No family hx of      Macular Degeneration No family hx of         Medications:     Current Outpatient Medications   Medication Sig     Rimegepant Sulfate 75 MG TBDP Take 1 tablet by mouth daily as needed (headache)     albuterol (PROAIR HFA/PROVENTIL HFA/VENTOLIN HFA) 108 (90 Base) MCG/ACT inhaler Inhale 1-2 puffs into the lungs as needed     albuterol (PROVENTIL) (2.5 MG/3ML) 0.083% neb solution Inhale 2.5 mg into the lungs as needed     dexmethylphenidate (FOCALIN XR) 15 MG 24 hr capsule Take 1 capsule (15 mg) by mouth daily     levothyroxine (SYNTHROID/LEVOTHROID) 25 MCG tablet Take 1 tablet (25 mcg) by mouth daily     nortriptyline (PAMELOR) 50 MG capsule TAKE 1 CAPSULE BY MOUTH AT BEDTIME     topiramate (TOPAMAX) 50 MG tablet Take 1 tablet (50 mg) by mouth 2 times daily     No current facility-administered medications for this visit.        Allergies:     Allergies   Allergen Reactions     Wasp Venom Hives     Swelling past two joints     Higinio Flavor Hives     Red Dye Hives     Retin-A [Tretinoin] Hives        Review of Systems:   As noted above      Physical Exam:   General: Seated comfortably in no acute distress.  Neurologic:     Mental Status: Fully alert, attentive and oriented. Speech clear and fluent, no paraphasic errors.     Cranial Nerves: EOM appear intact. Facial movements symmetric. Hearing not formally tested but intact to conversation.  No dysarthria.     Motor: No tremors or other abnormal movements observed.      Sensory:Not able to be tested virtually           Assessment and Plan:   Assessment:  Deni Simental is a pleasant 41 year old female seen in follow up for headaches.  These are migrainous in nature as previously noted and potential for untreated ADALID as a further provoking cause which she is being evaluated by our sleep colleagues for currently/with sleep study pending.   Her headache control has improved on her current regimen.  She especially notes a big improvement with the nurtec PRN, which is wonderful.     Discussed that  long term/post sleep study we could consider weaning off of some of these medications if she is still doing well.     Plan:  --Continue magnesium, riboflavin daily  --Continue topiramate 50 mg BID--discussed going higher on this, she is content to stay at 50 mg which is reasonable, as well as nortriptyline 50 mg at bedtime  --Nurtec PRN for bad migraine headaches  --Will await further sleep data as well.  --Follow up in ~4-5 months, she will reach out with questions or changes in the meanwhile.                Charles Vega MD   of Neurology   [facility]      The total time of this encounter today amounted to 11 minutes of time on video visit and 21 minutes in total. This time included time spent with the patient, prep work, ordering tests, and performing post visit documentation.      Deni is a 41 year old who is being evaluated via a billable video visit.      How would you like to obtain your AVS? MyChart  If the video visit is dropped, the invitation should  be resent by: Text to cell phone: [phone]  Will anyone else be joining your video visit? No      Video Start Time:   Video-Visit Details    Type of service:  Video Visit    Video End Time:    Originating Location (pt. Location):     Distant Location (provider location):  [facility] [place]     Platform used for Video Visit:   ELPIDIO Carreno CMA ([facility])            Again, thank you for allowing me to participate in the care of your patient.        Sincerely,        Lev Vega MD

## 2022-05-19 NOTE — PATIENT INSTRUCTIONS
Continue medications as currently  Reach out with any issues in the mean time  We will formally touch base again in the fall

## 2022-06-06 ENCOUNTER — OFFICE VISIT (OUTPATIENT)
Dept: SLEEP MEDICINE | Facility: CLINIC | Age: 42
End: 2022-06-06
Attending: OTOLARYNGOLOGY
Payer: COMMERCIAL

## 2022-06-06 DIAGNOSIS — R06.81 APNEA: ICD-10-CM

## 2022-06-06 DIAGNOSIS — R06.83 SNORING: ICD-10-CM

## 2022-06-06 PROCEDURE — G0399 HOME SLEEP TEST/TYPE 3 PORTA: HCPCS | Performed by: OTOLARYNGOLOGY

## 2022-06-06 NOTE — PROGRESS NOTES
Pt is completing a home sleep test. Pt was instructed on how to put on the Noxturnal T3 device and associated equipment before going to bed and given the opportunity to practice putting it on before leaving the sleep center. Pt was reminded to bring the home sleep test kit back to the center tomorrow, at agreed upon time for download and reporting.    36.2

## 2022-06-07 ENCOUNTER — DOCUMENTATION ONLY (OUTPATIENT)
Dept: SLEEP MEDICINE | Facility: CLINIC | Age: 42
End: 2022-06-07
Payer: COMMERCIAL

## 2022-06-07 NOTE — PROGRESS NOTES
HST POST-STUDY QUESTIONNAIRE    1. What time did you go to bed?  1000  2. How long do you think it took to fall asleep?  20m  3. What time did you wake up to start the day?  530  4. Did you get up during the night at all?  y  5. If you woke up, do you remember approximately what time(s)? 5a  6. Did you have any difficulty with the equipment?  No  7. Did you us any type of treatment with this study?  Oral Appliance  8. Was the head of the bed elevated? No  9. Did you sleep in a recliner?  No  10. Did you stop using CPAP at least 3 days before this test?  NA  11. Any other information you'd like us to know? NA

## 2022-06-10 NOTE — PROGRESS NOTES
This HSAT was performed using a Noxturnal T3 device which recorded snore, sound, movement activity, body position, nasal pressure, oronasal thermal airflow, pulse, oximetry and both chest and abdominal respiratory effort. HSAT data was restricted to the time patient states they were in bed.     HSAT was scored using 1B 4% hypopnea rule.     AHI: 3.6. Snoring was reported as moderate.  Time with SpO2 below 89% was 0.4 minutes.   Overall signal quality was good     Pt will follow up with sleep provider to determine appropriate therapy.     Ordering Provider, Marvel Munson MD Charles O., BA, RPSGT, RST System Clinical Specialist 6/10/2022

## 2022-06-16 ENCOUNTER — VIRTUAL VISIT (OUTPATIENT)
Dept: SLEEP MEDICINE | Facility: CLINIC | Age: 42
End: 2022-06-16
Payer: COMMERCIAL

## 2022-06-16 DIAGNOSIS — R06.83 SNORING: Primary | ICD-10-CM

## 2022-06-16 PROCEDURE — 99213 OFFICE O/P EST LOW 20 MIN: CPT | Mod: 95 | Performed by: OTOLARYNGOLOGY

## 2022-06-16 NOTE — PROGRESS NOTES
Does Deni have a CPAP/Bipap?  No     [place] Sleep Scale: 12    Deni is a 41 year old who is being evaluated via a billable video visit.      How would you like to obtain your AVS? MyChart  If the video visit is dropped, the invitation should be resent by: Text to cell phone: [phone]  Will anyone else be joining your video visit? No              Subjective   Deni is a 41 year old, presenting for the following health issues:   Study Results        Vitals:  No vitals were obtained today due to virtual visit.          Video-Visit Details    Video Start Time: 3:58    Type of service:  Video Visit    Video End Time:4:17    Originating Location (pt. Location): Home    Distant Location (provider location):  [facility]     Platform used for Video Visit: PublishThis       ..  Sleep Study Follow-Up Visit:    Date on this visit: 6/16/2022    Deni Simental comes in today for follow-up of her sleep study done on 6/6/2022 at the [facility] for loud snoring and possible sleep apnea.      Data: A full night home sleep study was performed recording the standard physiologic parameters including body position, movement, sound, nasal pressure, thermal oral airflow, chest and abdominal movements with respiratory inductance plethysmography, and oxygen saturation by pulse oximetry. Pulse rate was estimated by oximetry recording. This study was considered adequate based on > 4 hours of quality oximetry and respiratory recording. As specified by the AASM Manual for the Scoring of Sleep and Associated events, version 2.3, Rule VIII.D 1B, 4% oxygen desaturation scoring for hypopneas is used as a standard of care on all home sleep apnea testing.     Analysis Time:  409 minutes     Respiration:   Sleep Associated Hypoxemia: sustained hypoxemia was not present. Baseline oxygen saturation was 95%.  Time with saturation less than or equal to 88% was 0.3 minutes. The lowest oxygen  saturation was 83%.   Snoring: Snoring was present.  Respiratory events: The home study revealed a presence of 10 obstructive apneas and 0 mixed and central apneas. There were 15 hypopneas resulting in a combined apnea/hypopnea index [AHI] of 3.6 events per hour.  AHI was 5.5 per hour supine, 0 per hour prone, 0 per hour on left side, and 0 per hour on right side.   Pattern: Excluding events noted above, respiratory rate and pattern was Normal.     Position: Percent of time spent: supine - 64.9%, prone - 7.1%, on left - 12.9%, on right - 15.2%.     Heart Rate: By pulse oximetry tachycardia was noted.      Assessment:   NO obstructive sleep apnea.  Sleep associated hypoxemia was not present.  Primary snoring noted.  Recommendations:  Consider oral appliance therapy or positional therapy.(of snoring since mild elevation of AHI in supine position)  Suggest optimizing sleep hygiene and avoiding sleep deprivation.  Weight management.(BMI>30).     These findings were reviewed with patient.     Past medical/surgical history, family history, social history, medications and allergies were reviewed.      Problem List:  Patient Active Problem List    Diagnosis Date Noted     Adult ADHD 11/02/2021     Priority: Medium     Thyroglobulin antibody positive 06/10/2019     Priority: Medium     Alopecia 06/10/2019     Priority: Medium     Fatigue, unspecified type 06/10/2019     Priority: Medium     Poor sleep hygiene 06/10/2019     Priority: Medium     Thyroid nodule 06/10/2019     Priority: Medium     Hashimoto's thyroiditis 11/22/2017     Priority: Medium     Thyroglobulin AB 6.9 9/17 (nl <0.9)    Conversion from armour 15mg to levothyroxine 25 mg         Migraine with aura and without status migrainosus, not intractable 09/26/2017     Priority: Medium     CARDIOVASCULAR SCREENING; LDL GOAL LESS THAN 160 10/31/2010     Priority: Medium        Impression/Plan:    Patient sent the results of his sleep study.  There is no evidence of  sleep apnea.  She does have obstructive sleep apnea and borderline mild just the supine position.  At this point we discussed more or less treatment of snoring.  Considering it appears to be more positional she wants to implement simple positional therapy such as using body pillow or tennis balls in an old T-shirt  to condition and training the patient to sleep on her side.  Discussed importance of sleep hygiene.  Management of migraines also has to be optimized to reduce any sleep fragmentation and arousals.  She will follow up with me as needed depending on the status of his snoring and other symptoms.     19 minutes spent with patient, all of which were spent face-to-face counseling, consulting, coordinating plan of care.      Marvel Munson MD      CC: Radha Lay

## 2022-06-16 NOTE — PROGRESS NOTES
"   SUBJECTIVE:   CC: Deni Simental is an 41 year old woman who presents for preventive health visit.     Patient has been advised of split billing requirements and indicates understanding: Yes     Pt would like to discuss BC med per pt. Having periods that are longer and spaced out for past 3 months. No med changes or other changes except some weight loss.  Also seeing migraine with each period.  Spouse is having vasectomy, pt wants \"to be done\" with periods/cycling    Healthy Habits:     Getting at least 3 servings of Calcium per day:  Yes    Bi-annual eye exam:  NO    Dental care twice a year:  NO    Sleep apnea or symptoms of sleep apnea:  Daytime drowsiness and Excessive snoring    Diet:  Gluten-free/reduced    Frequency of exercise:  None    Taking medications regularly:  Yes    Medication side effects:  None    PHQ-2 Total Score: 0    Additional concerns today:  Yes                 Today's PHQ-2 Score:   PHQ-2 ( 1999 Pfizer) 6/22/2022   Q1: Little interest or pleasure in doing things 0   Q2: Feeling down, depressed or hopeless 0   PHQ-2 Score 0   PHQ-2 Total Score (12-17 Years)- Positive if 3 or more points; Administer PHQ-A if positive -   Q1: Little interest or pleasure in doing things Not at all   Q2: Feeling down, depressed or hopeless Not at all   PHQ-2 Score 0       Abuse: Current or Past (Physical, Sexual or Emotional) - No  Do you feel safe in your environment? Yes    Have you ever done Advance Care Planning? (For example, a Health Directive, POLST, or a discussion with a medical provider or your loved ones about your wishes): No, advance care planning information given to patient to review.  Patient declined advance care planning discussion at this time.    Social History     Tobacco Use     Smoking status: Former Smoker     Packs/day: 0.00     Years: 0.00     Pack years: 0.00     Types: Cigarettes     Quit date: 9/17/2011     Years since quitting: 10.7     Smokeless tobacco: Never Used     " Tobacco comment: Lives in smoke free household   Substance Use Topics     Alcohol use: No     Alcohol/week: 0.0 standard drinks     If you drink alcohol do you typically have >3 drinks per day or >7 drinks per week? No    Alcohol Use 6/22/2022   Prescreen: >3 drinks/day or >7 drinks/week? No   Prescreen: >3 drinks/day or >7 drinks/week? -   No flowsheet data found.    Reviewed orders with patient.  Reviewed health maintenance and updated orders accordingly - Yes    G 3 P 2   Patient's last menstrual period was 05/28/2022 (approximate).     Fasting: No   Td: tdap 5/2013       Flu: declined      Covid: Mod 5/20/2021      Shingrix: NA      PPV: NA      Last 3 Pap and HPV Results:   PAP / HPV Latest Ref Rng & Units 10/1/2018 9/10/2015 9/4/2012   PAP (Historical) - NIL NIL NIL   HPV16 NEG:Negative Negative - -   HPV18 NEG:Negative Negative - -   HRHPV NEG:Negative Negative - -                  Cholesterol:   Lab Results   Component Value Date    CHOL 220 06/30/2020     Lab Results   Component Value Date    HDL 48 06/30/2020     Lab Results   Component Value Date     06/30/2020     Lab Results   Component Value Date    TRIG 129 06/30/2020     Lab Results   Component Value Date    CHOLHDLRATIO 4.4 05/07/2010         MMG: NA  Dexa:  NA     Flex/colo: NA      Seat Belt: Yes    Sunscreen use: Yes   Calcium Intake: adeq  Health Care Directive: No  Sexually Active: Yes     Current contraception: condoms  History of abnormal Pap smear: No  Family history of colon/breast/ovarian cancer: No  Regular self breast exam: Yes  History of abnormal mammogram: No      Labs reviewed in EPIC  BP Readings from Last 3 Encounters:   06/22/22 129/85   02/16/22 123/83   11/02/21 129/87    Wt Readings from Last 3 Encounters:   06/22/22 98 kg (216 lb)   02/16/22 99.8 kg (220 lb)   11/02/21 101.4 kg (223 lb 9.6 oz)                  Patient Active Problem List   Diagnosis     CARDIOVASCULAR SCREENING; LDL GOAL LESS THAN 160     Migraine with  aura and without status migrainosus, not intractable     Hashimoto's thyroiditis     Thyroglobulin antibody positive     Alopecia     Fatigue, unspecified type     Poor sleep hygiene     Thyroid nodule     Adult ADHD     Past Surgical History:   Procedure Laterality Date     HC TOOTH EXTRACTION W/FORCEP         Social History     Tobacco Use     Smoking status: Former Smoker     Packs/day: 0.00     Years: 0.00     Pack years: 0.00     Types: Cigarettes     Quit date: 2011     Years since quitting: 10.7     Smokeless tobacco: Never Used     Tobacco comment: Lives in smoke free household   Substance Use Topics     Alcohol use: No     Alcohol/week: 0.0 standard drinks     Family History   Problem Relation Age of Onset     Alcohol/Drug Mother          MVA alcohol related     Diabetes Father         hypoglycemia     Hypertension Father      Psoriatic Arthritis Father      Cancer Paternal Grandfather         lung cancer     Hypothyroidism Paternal Grandmother      Graves' disease Sister         Graves, tumor     C.A.D. No family hx of      Depression No family hx of      Gastrointestinal Disease No family hx of      Heart Disease No family hx of      Lipids No family hx of      Neurologic Disorder No family hx of      Respiratory No family hx of      Cerebrovascular Disease No family hx of      Glaucoma No family hx of      Macular Degeneration No family hx of          Current Outpatient Medications   Medication Sig Dispense Refill     albuterol (PROAIR HFA/PROVENTIL HFA/VENTOLIN HFA) 108 (90 Base) MCG/ACT inhaler Inhale 1-2 puffs into the lungs as needed       albuterol (PROVENTIL) (2.5 MG/3ML) 0.083% neb solution Inhale 2.5 mg into the lungs as needed       dexmethylphenidate (FOCALIN XR) 15 MG 24 hr capsule Take 1 capsule (15 mg) by mouth daily 30 capsule 0     levothyroxine (SYNTHROID/LEVOTHROID) 25 MCG tablet Take 1 tablet (25 mcg) by mouth daily 90 tablet 1     nortriptyline (PAMELOR) 50 MG capsule TAKE 1  "CAPSULE BY MOUTH AT BEDTIME 90 capsule 1     Rimegepant Sulfate 75 MG TBDP Take 1 tablet by mouth daily as needed (headache) 9 tablet 5     topiramate (TOPAMAX) 50 MG tablet Take 1 tablet (50 mg) by mouth 2 times daily 180 tablet 1       Breast Cancer Screening:  Any new diagnosis of family breast, ovarian, or bowel cancer? No    FHS-7: No flowsheet data found.    Patient under 40 years of age: Routine Mammogram Screening not recommended.   Pertinent mammograms are reviewed under the imaging tab.      Reviewed and updated as needed this visit by clinical staff   Tobacco  Allergies  Meds  Problems  Med Hx  Surg Hx  Fam Hx  Soc   Hx          Reviewed and updated as needed this visit by Provider   Tobacco  Allergies  Meds  Problems  Med Hx  Surg Hx  Fam Hx               Review of Systems   Constitutional: Negative for chills and fever.   HENT: Negative for congestion, ear pain, hearing loss and sore throat.    Eyes: Negative for pain and visual disturbance.   Respiratory: Negative for cough and shortness of breath.    Cardiovascular: Negative for chest pain, palpitations and peripheral edema.   Gastrointestinal: Negative for abdominal pain, constipation, diarrhea, heartburn, hematochezia and nausea.   Breasts:  Negative for tenderness, breast mass and discharge.   Genitourinary: Negative for dysuria, frequency, genital sores, hematuria, pelvic pain, urgency, vaginal bleeding and vaginal discharge.   Musculoskeletal: Positive for arthralgias. Negative for joint swelling and myalgias.   Skin: Negative for rash.   Neurological: Positive for headaches. Negative for dizziness, weakness and paresthesias.   Psychiatric/Behavioral: Negative for mood changes. The patient is not nervous/anxious.           OBJECTIVE:   /85   Pulse 96   Temp 97.5  F (36.4  C) (Tympanic)   Resp 16   Ht 1.638 m (5' 4.5\")   Wt 98 kg (216 lb)   LMP 05/28/2022 (Approximate)   SpO2 98%   Breastfeeding No   BMI 36.50 kg/m  "   Physical Exam  GENERAL: healthy, alert and no distress  EYES: Eyes grossly normal to inspection, PERRL and conjunctivae and sclerae normal  HENT: ear canals and TM's normal, nose and mouth without ulcers or lesions  NECK: no adenopathy, no asymmetry, masses, or scars and thyroid normal to palpation  RESP: lungs clear to auscultation - no rales, rhonchi or wheezes  BREAST: normal without masses, tenderness or nipple discharge and no palpable axillary masses or adenopathy  CV: regular rate and rhythm, normal S1 S2, no S3 or S4, no murmur, click or rub, no peripheral edema and peripheral pulses strong  ABDOMEN: soft, nontender, no hepatosplenomegaly, no masses and bowel sounds normal  MS: no gross musculoskeletal defects noted, no edema  SKIN: no suspicious lesions or rashes  NEURO: Normal strength and tone, mentation intact and speech normal  PSYCH: mentation appears normal, affect normal/bright    Diagnostic Test Results:  Labs reviewed in Epic    ASSESSMENT/PLAN:   (Z00.00) Routine general medical examination at a health care facility  (primary encounter diagnosis)  Comment: preventive needs reviewed   Plan: see orders in Epic.     (F90.9) Adult ADHD  Comment: side effects from concerta and focalin, vyvanse too expensive  Plan: methylphenidate (RITALIN LA) 20 MG 24 hr         capsule, OFFICE/OUTPT VISIT,EST,LEVL IV        Trial of ritalin la, tolerated ritalin in childhood    (E06.3) Hashimoto's thyroiditis  Comment: to goal  Plan: levothyroxine (SYNTHROID/LEVOTHROID) 25 MCG         tablet, OFFICE/OUTPT VISIT,EST,LEVL IV        Refill x 1 yr     (N91.5) Oligomenorrhea, unspecified type  Comment: uncertain etiology  Plan: OFFICE/OUTPT VISIT,EST,LEVL IV        Discussed options, only option to control cycling would be systemic hormones (OCP/depo/nexplanon) and pt does not want that. Had difficulty with Mirena in past.      (G43.109) Migraine with aura and without status migrainosus, not intractable  Comment:  "improved on topamax and nortriptyline  Plan: OFFICE/OUTPT VISIT,ANGELICA KERR IV        Follows with neurology          COUNSELING:  Reviewed preventive health counseling, as reflected in patient instructions  Special attention given to:        Regular exercise       Healthy diet/nutrition    Estimated body mass index is 36.5 kg/m  as calculated from the following:    Height as of this encounter: 1.638 m (5' 4.5\").    Weight as of this encounter: 98 kg (216 lb).    Weight management plan: Discussed healthy diet and exercise guidelines    She reports that she quit smoking about 10 years ago. Her smoking use included cigarettes. She smoked 0.00 packs per day for 0.00 years. She has never used smokeless tobacco.      Counseling Resources:  ATP IV Guidelines  Pooled Cohorts Equation Calculator  Breast Cancer Risk Calculator  BRCA-Related Cancer Risk Assessment: FHS-7 Tool  FRAX Risk Assessment  ICSI Preventive Guidelines  Dietary Guidelines for Americans, 2010  USDA's MyPlate  ASA Prophylaxis  Lung CA Screening    Radha Lay MD  [facility]  "

## 2022-06-16 NOTE — PATIENT INSTRUCTIONS
Preventive Health Recommendations  Female Ages 26 - 39  Yearly exam:   See your health care provider every year in order to  Review health changes.   Discuss preventive care.    Review your medicines if you your doctor has prescribed any.    Until age 30: Get a Pap test every three years (more often if you have had an abnormal result).    After age 30: Talk to your doctor about whether you should have a Pap test every 3 years or have a Pap test with HPV screening every 5 years.   You do not need a Pap test if your uterus was removed (hysterectomy) and you have not had cancer.  You should be tested each year for STDs (sexually transmitted diseases), if you're at risk.   Talk to your provider about how often to have your cholesterol checked.  If you are at risk for diabetes, you should have a diabetes test (fasting glucose).  Shots: Get a flu shot each year. Get a tetanus shot every 10 years.   Nutrition:   Eat at least 5 servings of fruits and vegetables each day.  Eat whole-grain bread, whole-wheat pasta and brown rice instead of white grains and rice.  Get adequate Calcium and Vitamin D.     Lifestyle  Exercise at least 150 minutes a week (30 minutes a day, 5 days of the week). This will help you control your weight and prevent disease.  Limit alcohol to one drink per day.  No smoking.   Wear sunscreen to prevent skin cancer.  See your dentist every six months for an exam and cleaning.      Start ritphoenix OMALLEY and notify Radha Lay MD how you are doing via SensorLogic message when due for refill

## 2022-06-16 NOTE — PROGRESS NOTES
"HOME SLEEP STUDY INTERPRETATION    Patient: Deni Simental  MRN: [id number]  YOB: 1980  Study Date: 6/6/2022  PCP/Referring Provider: Radha Lay;   Ordering Provider: Marvel Munson MD.     Indications for Home Study: Deni Simental is a 41 year old female with a history of snoring, apneas who presents with symptoms suggestive of obstructive sleep apnea.    Estimated body mass index is 37.18 kg/m  as calculated from the following:    Height as of 11/2/21: 1.638 m (5' 4.5\").    Weight as of 2/16/22: 99.8 kg (220 lb).  Total score - [place]: 5 (4/7/2022 11:47 AM)  STOP-BANG: 3/8    Data: A full night home sleep study was performed recording the standard physiologic parameters including body position, movement, sound, nasal pressure, thermal oral airflow, chest and abdominal movements with respiratory inductance plethysmography, and oxygen saturation by pulse oximetry. Pulse rate was estimated by oximetry recording. This study was considered adequate based on > 4 hours of quality oximetry and respiratory recording. As specified by the AASM Manual for the Scoring of Sleep and Associated events, version 2.3, Rule VIII.D 1B, 4% oxygen desaturation scoring for hypopneas is used as a standard of care on all home sleep apnea testing.    Analysis Time:  409 minutes    Respiration:   Sleep Associated Hypoxemia: sustained hypoxemia was not present. Baseline oxygen saturation was 95%.  Time with saturation less than or equal to 88% was 0.3 minutes. The lowest oxygen saturation was 83%.   Snoring: Snoring was present.  Respiratory events: The home study revealed a presence of 10 obstructive apneas and 0 mixed and central apneas. There were 15 hypopneas resulting in a combined apnea/hypopnea index [AHI] of 3.6 events per hour.  AHI was 5.5 per hour supine, 0 per hour prone, 0 per hour on left side, and 0 per hour on right side.   Pattern: Excluding events noted above, respiratory rate and " pattern was Normal.    Position: Percent of time spent: supine - 64.9%, prone - 7.1%, on left - 12.9%, on right - 15.2%.    Heart Rate: By pulse oximetry tachycardia was noted.     Assessment:   NO obstructive sleep apnea.  Sleep associated hypoxemia was not present.  Primary snoring noted.  Recommendations:  Consider oral appliance therapy or positional therapy.(of snoring since mild elevation of AHI in supine position)  Suggest optimizing sleep hygiene and avoiding sleep deprivation.  Weight management.(BMI>30).    Diagnosis Code(s): Snoring R06.83    Mavrel Munson MD,  June 16, 2022   Diplomate, American Board of Otolaryngology, Sleep Medicine

## 2022-06-22 ENCOUNTER — OFFICE VISIT (OUTPATIENT)
Dept: FAMILY MEDICINE | Facility: CLINIC | Age: 42
End: 2022-06-22
Payer: COMMERCIAL

## 2022-06-22 VITALS
WEIGHT: 216 LBS | OXYGEN SATURATION: 98 % | HEIGHT: 65 IN | TEMPERATURE: 97.5 F | DIASTOLIC BLOOD PRESSURE: 85 MMHG | SYSTOLIC BLOOD PRESSURE: 129 MMHG | RESPIRATION RATE: 16 BRPM | HEART RATE: 96 BPM | BODY MASS INDEX: 35.99 KG/M2

## 2022-06-22 DIAGNOSIS — G43.109 MIGRAINE WITH AURA AND WITHOUT STATUS MIGRAINOSUS, NOT INTRACTABLE: ICD-10-CM

## 2022-06-22 DIAGNOSIS — F90.9 ADULT ADHD: ICD-10-CM

## 2022-06-22 DIAGNOSIS — E06.3 HASHIMOTO'S THYROIDITIS: ICD-10-CM

## 2022-06-22 DIAGNOSIS — N91.5 OLIGOMENORRHEA, UNSPECIFIED TYPE: ICD-10-CM

## 2022-06-22 DIAGNOSIS — Z00.00 ROUTINE GENERAL MEDICAL EXAMINATION AT A HEALTH CARE FACILITY: Primary | ICD-10-CM

## 2022-06-22 PROCEDURE — 99395 PREV VISIT EST AGE 18-39: CPT | Performed by: FAMILY MEDICINE

## 2022-06-22 PROCEDURE — 99214 OFFICE O/P EST MOD 30 MIN: CPT | Mod: 25 | Performed by: FAMILY MEDICINE

## 2022-06-22 RX ORDER — METHYLPHENIDATE HYDROCHLORIDE 20 MG/1
20 CAPSULE, EXTENDED RELEASE ORAL DAILY
Qty: 30 CAPSULE | Refills: 0 | Status: SHIPPED | OUTPATIENT
Start: 2022-06-22 | End: 2022-08-20

## 2022-06-22 RX ORDER — LEVOTHYROXINE SODIUM 25 UG/1
25 TABLET ORAL DAILY
Qty: 90 TABLET | Refills: 3 | Status: SHIPPED | OUTPATIENT
Start: 2022-06-22 | End: 2023-06-13

## 2022-06-22 RX ORDER — DEXMETHYLPHENIDATE HYDROCHLORIDE 15 MG/1
15 CAPSULE, EXTENDED RELEASE ORAL DAILY
Qty: 30 CAPSULE | Refills: 0 | Status: CANCELLED | OUTPATIENT
Start: 2022-06-22

## 2022-06-22 ASSESSMENT — ENCOUNTER SYMPTOMS
HEMATURIA: 0
ABDOMINAL PAIN: 0
COUGH: 0
SHORTNESS OF BREATH: 0
JOINT SWELLING: 0
ARTHRALGIAS: 1
PARESTHESIAS: 0
HEADACHES: 1
PALPITATIONS: 0
WEAKNESS: 0
SORE THROAT: 0
MYALGIAS: 0
FEVER: 0
DYSURIA: 0
FREQUENCY: 0
CHILLS: 0
HEMATOCHEZIA: 0
DIARRHEA: 0
BREAST MASS: 0
CONSTIPATION: 0
HEARTBURN: 0
NERVOUS/ANXIOUS: 0
NAUSEA: 0
EYE PAIN: 0
DIZZINESS: 0

## 2022-08-20 ENCOUNTER — MYC REFILL (OUTPATIENT)
Dept: FAMILY MEDICINE | Facility: CLINIC | Age: 42
End: 2022-08-20

## 2022-08-20 DIAGNOSIS — F90.9 ADULT ADHD: ICD-10-CM

## 2022-08-22 RX ORDER — METHYLPHENIDATE HYDROCHLORIDE 30 MG/1
30 CAPSULE, EXTENDED RELEASE ORAL EVERY MORNING
Qty: 30 CAPSULE | Refills: 0 | Status: SHIPPED | OUTPATIENT
Start: 2022-08-22 | End: 2023-07-05

## 2022-10-26 ENCOUNTER — TELEPHONE (OUTPATIENT)
Dept: NEUROLOGY | Facility: CLINIC | Age: 42
End: 2022-10-26

## 2022-10-26 ENCOUNTER — VIRTUAL VISIT (OUTPATIENT)
Dept: NEUROLOGY | Facility: CLINIC | Age: 42
End: 2022-10-26
Payer: COMMERCIAL

## 2022-10-26 DIAGNOSIS — G43.109 MIGRAINE WITH AURA AND WITHOUT STATUS MIGRAINOSUS, NOT INTRACTABLE: ICD-10-CM

## 2022-10-26 PROCEDURE — 99213 OFFICE O/P EST LOW 20 MIN: CPT | Mod: 95 | Performed by: STUDENT IN AN ORGANIZED HEALTH CARE EDUCATION/TRAINING PROGRAM

## 2022-10-26 RX ORDER — NORTRIPTYLINE HYDROCHLORIDE 50 MG/1
CAPSULE ORAL
Qty: 90 CAPSULE | Refills: 1 | Status: SHIPPED | OUTPATIENT
Start: 2022-10-26 | End: 2023-04-26

## 2022-10-26 RX ORDER — TOPIRAMATE 50 MG/1
50 TABLET, FILM COATED ORAL 2 TIMES DAILY
Qty: 180 TABLET | Refills: 1 | Status: SHIPPED | OUTPATIENT
Start: 2022-10-26 | End: 2023-04-26

## 2022-10-26 NOTE — PROGRESS NOTES
Deni is a 41 year old who is being evaluated via a billable video visit.      How would you like to obtain your AVS? MyChart  If the video visit is dropped, the invitation should be resent by: Text to cell phone: [phone]  Will anyone else be joining your video visit? No        Video-Visit Details    Video Start Time: 9:16 AM    Type of service:  Video Visit     Video End Time:9:27 AM    Originating Location (pt. Location): Home        Distant Location (provider location):  On-site    Platform used for Video Visit: JaredWell

## 2022-10-26 NOTE — PATIENT INSTRUCTIONS
Continue topiramate, nortriptyline at same doses  Continue imitrex PRN  Continue to prioritize sleep, hydration

## 2022-10-26 NOTE — LETTER
10/26/2022         RE: Deni Simental  [id number] [id number]        Dear Colleague,    Thank you for referring your patient, Deni Simental, to the [facility] [place]. Please see a copy of my visit note below.    [facility]  Section of General Neurology  Return Patient  Virtual Visit    Deni Simental MRN# [id number]   Age: 41 year old YOB: 1980            Assessment and Plan:   Assessment:  Deni Simental is a very pleasant 41 year old female seen in follow up for migraine headaches.  Since our last visit she had her sleep study and there were some changes seen on testing when she was supine but not enough to meet ADALID criteria.  She is now sleeping on her side.  Overall headaches for the most part are doing great,~ 3-4 a month but nurtec is effective enough that she has not had to miss work in several months which is excellent news.  Discussed possibility of taper and if so I would favor nortriptyline if we were to.  She is comfortable as she has no side effects and great quality of life.  All questions answered.     Plan:  Continue topiramate 50 mg BID, nortriptyline 50 mg at bedtime, discussed side effects  Continue nurtec PRN  Continue magnesium, riboflavin  Follow up with me in 6 months to a year        Charles Vega MD   of Neurology   [facility]      Interval history:     She has gone 2 full months without missing work due to a migraine, awesome news.  Still getting ~3-4 a month and nurtec helps.  Sleep is still an issue, she wonders if 2/2 thyroid.  Poor sleep a common trigger.  Is sleeping on side more.    She feels the topiramate was the most helpful in terms of daily additions  No known side effects  Dulce makes an appearance again today (her dog--now 10 months)    Plan at last visit:  --Continue magnesium, riboflavin daily  --Continue topiramate 50  "mg BID--discussed going higher on this, she is content to stay at 50 mg which is reasonable, as well as nortriptyline 50 mg at bedtime  --Nurtec PRN for bad migraine headaches  --Will await further sleep data as well.  --Follow up in ~4-5 months, she will reach out with questions or changes in the meanwhile.      Sleep study results/sleep physician recommendations:  \"Assessment:   NO obstructive sleep apnea.  Sleep associated hypoxemia was not present.  Primary snoring noted.  Recommendations:  Consider oral appliance therapy or positional therapy.(of snoring since mild elevation of AHI in supine position)  Suggest optimizing sleep hygiene and avoiding sleep deprivation.  Weight management.(BMI>30).\"    Past Medical History:     Patient Active Problem List   Diagnosis     CARDIOVASCULAR SCREENING; LDL GOAL LESS THAN 160     Migraine with aura and without status migrainosus, not intractable     Hashimoto's thyroiditis     Thyroglobulin antibody positive     Alopecia     Fatigue, unspecified type     Poor sleep hygiene     Thyroid nodule     Adult ADHD     Past Medical History:   Diagnosis Date     Allergy     multiple     Ankle sprain 4/10     Arthritis      Depression     in teens -      Hashimoto's thyroiditis      IUD (intrauterine device) in place     mirena 05     Uncomplicated asthma         Past Surgical History:     Past Surgical History:   Procedure Laterality Date     HC TOOTH EXTRACTION W/FORCEP          Social History:     Social History     Tobacco Use     Smoking status: Former     Packs/day: 0.00     Years: 0.00     Pack years: 0.00     Types: Cigarettes     Quit date: 2011     Years since quittin.1     Smokeless tobacco: Never     Tobacco comments:     Lives in smoke free household   Vaping Use     Vaping Use: Never used   Substance Use Topics     Alcohol use: No     Alcohol/week: 0.0 standard drinks     Drug use: No        Family History:     Family History   Problem Relation Age of " Onset     Alcohol/Drug Mother          MVA alcohol related     Diabetes Father         hypoglycemia     Hypertension Father      Psoriatic Arthritis Father      Cancer Paternal Grandfather         lung cancer     Hypothyroidism Paternal Grandmother      Graves' disease Sister         Graves, tumor     C.A.D. No family hx of      Depression No family hx of      Gastrointestinal Disease No family hx of      Heart Disease No family hx of      Lipids No family hx of      Neurologic Disorder No family hx of      Respiratory No family hx of      Cerebrovascular Disease No family hx of      Glaucoma No family hx of      Macular Degeneration No family hx of         Medications:     Current Outpatient Medications   Medication Sig     albuterol (PROAIR HFA/PROVENTIL HFA/VENTOLIN HFA) 108 (90 Base) MCG/ACT inhaler Inhale 1-2 puffs into the lungs as needed     albuterol (PROVENTIL) (2.5 MG/3ML) 0.083% neb solution Inhale 2.5 mg into the lungs as needed     levothyroxine (SYNTHROID/LEVOTHROID) 25 MCG tablet Take 1 tablet (25 mcg) by mouth daily     methylphenidate (RITALIN LA) 30 MG 24 hr capsule Take 1 capsule (30 mg) by mouth every morning     nortriptyline (PAMELOR) 50 MG capsule TAKE 1 CAPSULE BY MOUTH AT BEDTIME     Rimegepant Sulfate 75 MG TBDP Take 1 tablet by mouth daily as needed (headache)     topiramate (TOPAMAX) 50 MG tablet Take 1 tablet (50 mg) by mouth 2 times daily     No current facility-administered medications for this visit.        Allergies:     Allergies   Allergen Reactions     Wasp Venom Hives     Swelling past two joints     Higinio Flavor Hives     Red Dye Hives     Retin-A [Tretinoin] Hives        Review of Systems:   As noted above     Physical Exam:   General: Seated comfortably in no acute distress.  Neurologic:     Mental Status: Fully alert, attentive and oriented. Speech clear and fluent, no paraphasic errors.     Cranial Nerves: Facial movements symmetric. Hearing not formally tested but  intact to conversation.  No dysarthria.     Motor: No tremors or other abnormal movements observed.      Sensory:Not able to be tested virtually                       The total time of this encounter today amounted to 11 minutes of time on video visit and 20 minutes in total. This time included time spent with the patient, prep work, ordering tests, and performing post visit documentation.      Deni is a 41 year old who is being evaluated via a billable video visit.      How would you like to obtain your AVS? MyChart  If the video visit is dropped, the invitation should be resent by: Text to cell phone: [phone]  Will anyone else be joining your video visit? No        Video-Visit Details    Video Start Time: 9:16 AM    Type of service:  Video Visit     Video End Time:9:27 AM    Originating Location (pt. Location): Home        Distant Location (provider location):  On-site    Platform used for Video Visit: Conchita            Again, thank you for allowing me to participate in the care of your patient.        Sincerely,        Lev Vega MD

## 2022-10-26 NOTE — TELEPHONE ENCOUNTER
Spoke with patient To schedule follow up 10/27/22  per patient  Request. Patient would like to call and schedule appointment on 10/27 due to a current work calendar coming out on the 27 th.  Writer understood, thanked patient for the visit and ended the call.  JARAD Carreno., GINA ([facility])

## 2022-10-26 NOTE — PROGRESS NOTES
[facility]  Section of General Neurology  Return Patient  Virtual Visit    Deni Simental MRN# [id number]   Age: 41 year old YOB: 1980            Assessment and Plan:   Assessment:  Deni Simental is a very pleasant 41 year old female seen in follow up for migraine headaches.  Since our last visit she had her sleep study and there were some changes seen on testing when she was supine but not enough to meet ADALID criteria.  She is now sleeping on her side.  Overall headaches for the most part are doing great,~ 3-4 a month but nurtec is effective enough that she has not had to miss work in several months which is excellent news.  Discussed possibility of taper and if so I would favor nortriptyline if we were to.  She is comfortable as she has no side effects and great quality of life.  All questions answered.     Plan:  Continue topiramate 50 mg BID, nortriptyline 50 mg at bedtime, discussed side effects  Continue nurtec PRN  Continue magnesium, riboflavin  Follow up with me in 6 months to a year        Charles Vega MD   of Neurology   [facility]      Interval history:     She has gone 2 full months without missing work due to a migraine, awesome news.  Still getting ~3-4 a month and nurtec helps.  Sleep is still an issue, she wonders if 2/2 thyroid.  Poor sleep a common trigger.  Is sleeping on side more.    She feels the topiramate was the most helpful in terms of daily additions  No known side effects  Dulce makes an appearance again today (her dog--now 10 months)    Plan at last visit:  --Continue magnesium, riboflavin daily  --Continue topiramate 50 mg BID--discussed going higher on this, she is content to stay at 50 mg which is reasonable, as well as nortriptyline 50 mg at bedtime  --Nurtec PRN for bad migraine headaches  --Will await further sleep data as well.  --Follow up in ~4-5 months, she will reach out with  "questions or changes in the meanwhile.      Sleep study results/sleep physician recommendations:  \"Assessment:   NO obstructive sleep apnea.  Sleep associated hypoxemia was not present.  Primary snoring noted.  Recommendations:  Consider oral appliance therapy or positional therapy.(of snoring since mild elevation of AHI in supine position)  Suggest optimizing sleep hygiene and avoiding sleep deprivation.  Weight management.(BMI>30).\"    Past Medical History:     Patient Active Problem List   Diagnosis     CARDIOVASCULAR SCREENING; LDL GOAL LESS THAN 160     Migraine with aura and without status migrainosus, not intractable     Hashimoto's thyroiditis     Thyroglobulin antibody positive     Alopecia     Fatigue, unspecified type     Poor sleep hygiene     Thyroid nodule     Adult ADHD     Past Medical History:   Diagnosis Date     Allergy     multiple     Ankle sprain 4/10     Arthritis      Depression     in teens -      Hashimoto's thyroiditis      IUD (intrauterine device) in place     mirena 05     Uncomplicated asthma         Past Surgical History:     Past Surgical History:   Procedure Laterality Date     HC TOOTH EXTRACTION W/FORCEP          Social History:     Social History     Tobacco Use     Smoking status: Former     Packs/day: 0.00     Years: 0.00     Pack years: 0.00     Types: Cigarettes     Quit date: 2011     Years since quittin.1     Smokeless tobacco: Never     Tobacco comments:     Lives in smoke free household   Vaping Use     Vaping Use: Never used   Substance Use Topics     Alcohol use: No     Alcohol/week: 0.0 standard drinks     Drug use: No        Family History:     Family History   Problem Relation Age of Onset     Alcohol/Drug Mother          MVA alcohol related     Diabetes Father         hypoglycemia     Hypertension Father      Psoriatic Arthritis Father      Cancer Paternal Grandfather         lung cancer     Hypothyroidism Paternal Grandmother      Graves' disease " Sister         Graves, tumor     C.A.D. No family hx of      Depression No family hx of      Gastrointestinal Disease No family hx of      Heart Disease No family hx of      Lipids No family hx of      Neurologic Disorder No family hx of      Respiratory No family hx of      Cerebrovascular Disease No family hx of      Glaucoma No family hx of      Macular Degeneration No family hx of         Medications:     Current Outpatient Medications   Medication Sig     albuterol (PROAIR HFA/PROVENTIL HFA/VENTOLIN HFA) 108 (90 Base) MCG/ACT inhaler Inhale 1-2 puffs into the lungs as needed     albuterol (PROVENTIL) (2.5 MG/3ML) 0.083% neb solution Inhale 2.5 mg into the lungs as needed     levothyroxine (SYNTHROID/LEVOTHROID) 25 MCG tablet Take 1 tablet (25 mcg) by mouth daily     methylphenidate (RITALIN LA) 30 MG 24 hr capsule Take 1 capsule (30 mg) by mouth every morning     nortriptyline (PAMELOR) 50 MG capsule TAKE 1 CAPSULE BY MOUTH AT BEDTIME     Rimegepant Sulfate 75 MG TBDP Take 1 tablet by mouth daily as needed (headache)     topiramate (TOPAMAX) 50 MG tablet Take 1 tablet (50 mg) by mouth 2 times daily     No current facility-administered medications for this visit.        Allergies:     Allergies   Allergen Reactions     Wasp Venom Hives     Swelling past two joints     Higinio Flavor Hives     Red Dye Hives     Retin-A [Tretinoin] Hives        Review of Systems:   As noted above     Physical Exam:   General: Seated comfortably in no acute distress.  Neurologic:     Mental Status: Fully alert, attentive and oriented. Speech clear and fluent, no paraphasic errors.     Cranial Nerves: Facial movements symmetric. Hearing not formally tested but intact to conversation.  No dysarthria.     Motor: No tremors or other abnormal movements observed.      Sensory:Not able to be tested virtually                       The total time of this encounter today amounted to 11 minutes of time on video visit and 20 minutes in total.  This time included time spent with the patient, prep work, ordering tests, and performing post visit documentation.

## 2023-01-07 ENCOUNTER — HEALTH MAINTENANCE LETTER (OUTPATIENT)
Age: 43
End: 2023-01-07

## 2023-02-24 DIAGNOSIS — G43.109 MIGRAINE WITH AURA AND WITHOUT STATUS MIGRAINOSUS, NOT INTRACTABLE: ICD-10-CM

## 2023-02-24 NOTE — TELEPHONE ENCOUNTER
Medication: Rimegepant Sulfate 75 MG TBDP  Sig:Take 1 tablet by mouth daily as needed (headache)   Date last written: 10/26/22  Dispensed amount: 9  Refills: 5    Requested Pharmacy: [id number] [place] [address]    Pt's last office visit: 10/26/22  Next scheduled office visit:       Per the RN/LPN medication refill protocol, writer is unable to refill this request.   JARAD Carreno., GINA ([facility])

## 2023-02-24 NOTE — TELEPHONE ENCOUNTER
Writer spoke with pharmacy who verified that pt has run out of refills for her Nurtec. Routed to provider to review and sign for nre prescription.     Amy Max, RNCC  Neurology/Neurosurgery

## 2023-02-28 ENCOUNTER — ANCILLARY PROCEDURE (OUTPATIENT)
Dept: GENERAL RADIOLOGY | Facility: CLINIC | Age: 43
End: 2023-02-28
Attending: FAMILY MEDICINE
Payer: COMMERCIAL

## 2023-02-28 ENCOUNTER — OFFICE VISIT (OUTPATIENT)
Dept: FAMILY MEDICINE | Facility: CLINIC | Age: 43
End: 2023-02-28
Payer: COMMERCIAL

## 2023-02-28 VITALS
TEMPERATURE: 97.6 F | OXYGEN SATURATION: 99 % | HEART RATE: 89 BPM | DIASTOLIC BLOOD PRESSURE: 82 MMHG | HEIGHT: 65 IN | BODY MASS INDEX: 39.15 KG/M2 | RESPIRATION RATE: 16 BRPM | SYSTOLIC BLOOD PRESSURE: 136 MMHG | WEIGHT: 235 LBS

## 2023-02-28 DIAGNOSIS — M25.542 JOINT PAIN IN FINGERS OF BOTH HANDS: Primary | ICD-10-CM

## 2023-02-28 DIAGNOSIS — M25.541 JOINT PAIN IN FINGERS OF BOTH HANDS: ICD-10-CM

## 2023-02-28 DIAGNOSIS — M25.542 JOINT PAIN IN FINGERS OF BOTH HANDS: ICD-10-CM

## 2023-02-28 DIAGNOSIS — M25.541 JOINT PAIN IN FINGERS OF BOTH HANDS: Primary | ICD-10-CM

## 2023-02-28 LAB
ALBUMIN SERPL-MCNC: 3.9 G/DL (ref 3.4–5)
ALP SERPL-CCNC: 105 U/L (ref 40–150)
ALT SERPL W P-5'-P-CCNC: 25 U/L (ref 0–50)
ANION GAP SERPL CALCULATED.3IONS-SCNC: 8 MMOL/L (ref 3–14)
AST SERPL W P-5'-P-CCNC: 10 U/L (ref 0–45)
BASOPHILS # BLD AUTO: 0 10E3/UL (ref 0–0.2)
BASOPHILS NFR BLD AUTO: 0 %
BILIRUB SERPL-MCNC: 0.2 MG/DL (ref 0.2–1.3)
BUN SERPL-MCNC: 12 MG/DL (ref 7–30)
CALCIUM SERPL-MCNC: 9.3 MG/DL (ref 8.5–10.1)
CHLORIDE BLD-SCNC: 108 MMOL/L (ref 94–109)
CO2 SERPL-SCNC: 23 MMOL/L (ref 20–32)
CREAT SERPL-MCNC: 0.82 MG/DL (ref 0.52–1.04)
CRP SERPL-MCNC: 3.2 MG/L (ref 0–8)
EOSINOPHIL # BLD AUTO: 0.1 10E3/UL (ref 0–0.7)
EOSINOPHIL NFR BLD AUTO: 2 %
ERYTHROCYTE [DISTWIDTH] IN BLOOD BY AUTOMATED COUNT: 14.6 % (ref 10–15)
ERYTHROCYTE [SEDIMENTATION RATE] IN BLOOD BY WESTERGREN METHOD: 19 MM/HR (ref 0–20)
GFR SERPL CREATININE-BSD FRML MDRD: >90 ML/MIN/1.73M2
GLUCOSE BLD-MCNC: 98 MG/DL (ref 70–99)
HCT VFR BLD AUTO: 39 % (ref 35–47)
HGB BLD-MCNC: 12.8 G/DL (ref 11.7–15.7)
LYMPHOCYTES # BLD AUTO: 2.5 10E3/UL (ref 0.8–5.3)
LYMPHOCYTES NFR BLD AUTO: 31 %
MCH RBC QN AUTO: 26.3 PG (ref 26.5–33)
MCHC RBC AUTO-ENTMCNC: 32.8 G/DL (ref 31.5–36.5)
MCV RBC AUTO: 80 FL (ref 78–100)
MONOCYTES # BLD AUTO: 0.5 10E3/UL (ref 0–1.3)
MONOCYTES NFR BLD AUTO: 7 %
NEUTROPHILS # BLD AUTO: 4.8 10E3/UL (ref 1.6–8.3)
NEUTROPHILS NFR BLD AUTO: 60 %
PLATELET # BLD AUTO: 469 10E3/UL (ref 150–450)
POTASSIUM BLD-SCNC: 3.6 MMOL/L (ref 3.4–5.3)
PROT SERPL-MCNC: 8.3 G/DL (ref 6.8–8.8)
RBC # BLD AUTO: 4.86 10E6/UL (ref 3.8–5.2)
SODIUM SERPL-SCNC: 139 MMOL/L (ref 133–144)
URATE SERPL-MCNC: 4.8 MG/DL (ref 2.6–6)
WBC # BLD AUTO: 7.9 10E3/UL (ref 4–11)

## 2023-02-28 PROCEDURE — 80053 COMPREHEN METABOLIC PANEL: CPT | Performed by: FAMILY MEDICINE

## 2023-02-28 PROCEDURE — 86140 C-REACTIVE PROTEIN: CPT | Performed by: FAMILY MEDICINE

## 2023-02-28 PROCEDURE — 99214 OFFICE O/P EST MOD 30 MIN: CPT | Performed by: FAMILY MEDICINE

## 2023-02-28 PROCEDURE — 86225 DNA ANTIBODY NATIVE: CPT | Performed by: FAMILY MEDICINE

## 2023-02-28 PROCEDURE — 84550 ASSAY OF BLOOD/URIC ACID: CPT | Performed by: FAMILY MEDICINE

## 2023-02-28 PROCEDURE — 86200 CCP ANTIBODY: CPT | Performed by: FAMILY MEDICINE

## 2023-02-28 PROCEDURE — 36415 COLL VENOUS BLD VENIPUNCTURE: CPT | Performed by: FAMILY MEDICINE

## 2023-02-28 PROCEDURE — 86431 RHEUMATOID FACTOR QUANT: CPT | Performed by: FAMILY MEDICINE

## 2023-02-28 PROCEDURE — 85025 COMPLETE CBC W/AUTO DIFF WBC: CPT | Performed by: FAMILY MEDICINE

## 2023-02-28 PROCEDURE — 86039 ANTINUCLEAR ANTIBODIES (ANA): CPT | Performed by: FAMILY MEDICINE

## 2023-02-28 PROCEDURE — 86038 ANTINUCLEAR ANTIBODIES: CPT | Performed by: FAMILY MEDICINE

## 2023-02-28 PROCEDURE — 85652 RBC SED RATE AUTOMATED: CPT | Performed by: FAMILY MEDICINE

## 2023-02-28 PROCEDURE — 73130 X-RAY EXAM OF HAND: CPT | Mod: TC | Performed by: RADIOLOGY

## 2023-02-28 RX ORDER — METHYLPREDNISOLONE 4 MG
TABLET, DOSE PACK ORAL
Qty: 21 TABLET | Refills: 0 | Status: SHIPPED | OUTPATIENT
Start: 2023-02-28 | End: 2023-07-05

## 2023-02-28 ASSESSMENT — PAIN SCALES - GENERAL: PAINLEVEL: MODERATE PAIN (5)

## 2023-02-28 NOTE — PROGRESS NOTES
Assessment & Plan     Joint pain in fingers of both hands   Deni Simental is a 42 year old female who presents today for evaluation of bilateral finger pain.  Patient reports she initially had pain in the distal interphalangeal oint of the right middle finger 2 weeks ago, subsequently she developed pain in all joints of the right middle finger last week she started to have pain into her left middle finger, and now the right ring finger. She has hx of hashimoto thyroiditis  and is on synthroid , father has hx of psoriasis   exam showed bilateral hand erythema which likely related to cold exposure, no warm th sensation , no swelling   I had a long discussion with the patient about her condition , diagnosis treatment options. We discussed diffenetial diagnosis, and expected course. DD includes arthritis, RA, lupus , gout less likely,   I recommend the following :  No fever or chills no other systemic symptoms   Due to the nature of migratory arthritis will obtain labs as below   etiology is unclear at this point   - XR Hand Bilateral G/E 3 Views; Future  - CRP, inflammation; Future  - ESR: Erythrocyte sedimentation rate; Future  - Comprehensive metabolic panel (BMP + Alb, Alk Phos, ALT, AST, Total. Bili, TP); Future  - CBC with platelets and differential; Future  - Cyclic Citrullinated Peptide Antibody IgG; Future  - Rheumatoid factor; Future  - DNA double stranded antibodies; Future  - Anti Nuclear Ashlyn IgG by IFA with Reflex; Future  - methylPREDNISolone (MEDROL DOSEPAK) 4 MG tablet therapy pack; Follow Package Directions  - Uric acid; Future  - CRP, inflammation  - ESR: Erythrocyte sedimentation rate  - Comprehensive metabolic panel (BMP + Alb, Alk Phos, ALT, AST, Total. Bili, TP)  - CBC with platelets and differential  - Cyclic Citrullinated Peptide Antibody IgG  - Rheumatoid factor  - DNA double stranded antibodies  - Anti Nuclear Ashlyn IgG by IFA with Reflex  - Uric acid  Prescribed Medrol dose cornelius   Next  "step based on labs   Patient verbalized understanding and agreed on the plan of care. All questions answered.              BMI:   Estimated body mass index is 39.71 kg/m  as calculated from the following:    Height as of this encounter: 1.638 m (5' 4.5\").    Weight as of this encounter: 106.6 kg (235 lb).           Return in about 3 months (around 5/28/2023), or if symptoms worsen or fail to improve, for Follow up, in person PCP.    Ofelia Alarcon MD  [facility]    Leora Cheema is a 42 year old, presenting for the following health issues:  Arthritis (Joint pain, middle fingers on both hands. X2 weeks /)      History of Present Illness       Reason for visit:  Sudden pain in the joints of my middle fingers    She eats 0-1 servings of fruits and vegetables daily.She consumes 3 sweetened beverage(s) daily.She exercises with enough effort to increase her heart rate 9 or less minutes per day.  She exercises with enough effort to increase her heart rate 3 or less days per week.   She is taking medications regularly.   Deni Simental is a 42 year old female who presents today for evaluation of bilateral finger pain.  Patient reports she initially had pain in the distal interphalangeal oint of the right middle finger 2 weeks ago, subsequently she developed pain in all joints of the right middle finger last week she started to have pain into her left middle finger, and now the right ring finger. She has hx of hashimoto thyroiditis  and is on synthroid , father has hx of psoriasis           Review of Systems   Constitutional, HEENT, cardiovascular, pulmonary, gi and gu systems are negative, except as otherwise noted.      Objective    /82   Pulse 89   Temp 97.6  F (36.4  C) (Tympanic)   Resp 16   Ht 1.638 m (5' 4.5\")   Wt 106.6 kg (235 lb)   LMP  (LMP Unknown)   SpO2 99%   BMI 39.71 kg/m    Body mass index is 39.71 kg/m .  Physical Exam  Vitals and nursing note reviewed. "   Constitutional:       General: She is not in acute distress.     Appearance: Normal appearance. She is not ill-appearing, toxic-appearing or diaphoretic.   HENT:      Head: Normocephalic and atraumatic.   Musculoskeletal:      Right hand: Tenderness present. No swelling, deformity, lacerations or bony tenderness. Normal range of motion. Normal strength. Normal sensation.      Left hand: Tenderness present. No swelling, deformity, lacerations or bony tenderness. Normal range of motion. Normal strength. Normal sensation.        Hands:    Neurological:      Mental Status: She is alert.

## 2023-03-01 LAB
ANA PAT SER IF-IMP: ABNORMAL
ANA SER QL IF: POSITIVE
ANA TITR SER IF: ABNORMAL {TITER}
CCP AB SER IA-ACNC: 1.7 U/ML
DSDNA AB SER-ACNC: 1.8 IU/ML
RHEUMATOID FACT SER NEPH-ACNC: <7 IU/ML

## 2023-04-25 NOTE — PROGRESS NOTES
[facility]/[place]  Section of General Neurology  Return Patient  Virtual Visit    Deni Simental MRN# [id number]   Age: 42 year old YOB: 1980          Assessment and Plan:   Deni Simental is a very pleasant 42 year old female seen in follow up for migraine headaches.  Since our last visit headaches are a little more frequent/worse in this sense but she has only missed work once and they remain overall not debilitating where she would not want to necessarily increase medications.  Likewise with headache days in total ~15 per month I don't think taper would be recommended either.   She is content with this plan/overall happy with where she was at compared to previously.  She will reach out with issues or changes in the meanwhile but I am hopeful she will continue to feel her headaches are not limiting.      Continue topiramate 50 mg BID, nortriptyline 50 mg at bedtime  Continue nurtec PRN  Continue magnesium, riboflavin  Follow up with me in 8-9 months      Charles Vega MD   of Neurology   [facility]      Interval history:     Remains on topiramate 50 mg BID  Nortriptyline 50 mg at night   No side effects  Sleep: remains beatrice  Trouble is more falling asleep.    Gets hot at night.    15 headache days per month still but a lot more manageable perhaps slightly worse in # of headaches than last time but not bothersome enough to warrant a change in her eyes, I agree.  Called in once in between our visits. Nothing was helping at that time  Usually nurtec helps.  No cost issues.    She is happy with where she is at overall  Have room to go higher if needed.     Her puppy is doing well, tearing up plants.           Past Medical History:     Patient Active Problem List   Diagnosis     CARDIOVASCULAR SCREENING; LDL GOAL LESS THAN 160     Migraine with aura and without status migrainosus, not intractable     Hashimoto's thyroiditis      Thyroglobulin antibody positive     Alopecia     Fatigue, unspecified type     Poor sleep hygiene     Thyroid nodule     Adult ADHD     Past Medical History:   Diagnosis Date     Allergy     multiple     Ankle sprain 4/10     Arthritis      Depression     in teens -      Hashimoto's thyroiditis      IUD (intrauterine device) in place     mirena 05     Uncomplicated asthma         Past Surgical History:     Past Surgical History:   Procedure Laterality Date     HC TOOTH EXTRACTION W/FORCEP          Social History:     Social History     Tobacco Use     Smoking status: Former     Packs/day: 0.00     Years: 0.00     Pack years: 0.00     Types: Cigarettes     Quit date: 2011     Years since quittin.6     Smokeless tobacco: Never     Tobacco comments:     Lives in smoke free household   Vaping Use     Vaping status: Never Used   Substance Use Topics     Alcohol use: No     Alcohol/week: 0.0 standard drinks of alcohol     Drug use: No        Family History:     Family History   Problem Relation Age of Onset     Alcohol/Drug Mother          MVA alcohol related     Diabetes Father         hypoglycemia     Hypertension Father      Psoriatic Arthritis Father      Psoriasis Father      Graves' disease Sister         Graves, tumor     Hypothyroidism Paternal Grandmother      Cancer Paternal Grandfather         lung cancer     C.A.D. No family hx of      Depression No family hx of      Gastrointestinal Disease No family hx of      Heart Disease No family hx of      Lipids No family hx of      Neurologic Disorder No family hx of      Respiratory No family hx of      Cerebrovascular Disease No family hx of      Glaucoma No family hx of      Macular Degeneration No family hx of         Medications:     Current Outpatient Medications   Medication Sig     albuterol (PROAIR HFA/PROVENTIL HFA/VENTOLIN HFA) 108 (90 Base) MCG/ACT inhaler Inhale 1-2 puffs into the lungs as needed     albuterol (PROVENTIL) (2.5 MG/3ML)  0.083% neb solution Inhale 2.5 mg into the lungs as needed     levothyroxine (SYNTHROID/LEVOTHROID) 25 MCG tablet Take 1 tablet (25 mcg) by mouth daily     methylphenidate (RITALIN LA) 30 MG 24 hr capsule Take 1 capsule (30 mg) by mouth every morning     methylPREDNISolone (MEDROL DOSEPAK) 4 MG tablet therapy pack Follow Package Directions     nortriptyline (PAMELOR) 50 MG capsule TAKE 1 CAPSULE BY MOUTH AT BEDTIME     rimegepant (NURTEC) 75 MG ODT tablet Take 1 tablet (75 mg) by mouth daily as needed (headache)     topiramate (TOPAMAX) 50 MG tablet Take 1 tablet (50 mg) by mouth 2 times daily     No current facility-administered medications for this visit.        Allergies:     Allergies   Allergen Reactions     Wasp Venom Hives     Swelling past two joints     [place]patch Flavor Hives     Red Dye Hives     Retin-A [Tretinoin] Hives        Review of Systems:   As noted above     Physical Exam:   General: Seated comfortably in no acute distress.  Neurologic:     Mental Status: Fully alert, attentive and oriented. Speech clear and fluent, no paraphasic errors.     Cranial Nerves: EOM appear intact. Facial movements symmetric. Hearing not formally tested but intact to conversation.  No dysarthria.     Motor: No tremors or other abnormal movements observed.      Sensory:Not able to be tested virtually

## 2023-04-26 ENCOUNTER — VIRTUAL VISIT (OUTPATIENT)
Dept: NEUROLOGY | Facility: CLINIC | Age: 43
End: 2023-04-26
Payer: COMMERCIAL

## 2023-04-26 DIAGNOSIS — G43.109 MIGRAINE WITH AURA AND WITHOUT STATUS MIGRAINOSUS, NOT INTRACTABLE: Primary | ICD-10-CM

## 2023-04-26 PROCEDURE — 99204 OFFICE O/P NEW MOD 45 MIN: CPT | Mod: VID | Performed by: STUDENT IN AN ORGANIZED HEALTH CARE EDUCATION/TRAINING PROGRAM

## 2023-04-26 RX ORDER — TOPIRAMATE 50 MG/1
50 TABLET, FILM COATED ORAL 2 TIMES DAILY
Qty: 180 TABLET | Refills: 2 | Status: SHIPPED | OUTPATIENT
Start: 2023-04-26 | End: 2024-01-31

## 2023-04-26 RX ORDER — NORTRIPTYLINE HYDROCHLORIDE 50 MG/1
CAPSULE ORAL
Qty: 90 CAPSULE | Refills: 2 | Status: SHIPPED | OUTPATIENT
Start: 2023-04-26 | End: 2024-01-31

## 2023-04-26 NOTE — PATIENT INSTRUCTIONS
Continue topiramate 50 mg BID  Continue nortriptyline 50 mg at bedtime  Continue nurtec as needed    Follow up in 8-9 months

## 2023-04-26 NOTE — LETTER
4/26/2023         RE: Deni Simental  [address] [place] [id number]        Dear Colleague,    Thank you for referring your patient, Deni Simental, to the [facility] [place]. Please see a copy of my visit note below.    [facility]/[place]  Section of General Neurology  Return Patient  Virtual Visit    Deni Simental MRN# [id number]   Age: 42 year old YOB: 1980          Assessment and Plan:   Deni Simental is a very pleasant 42 year old female seen in follow up for migraine headaches.  Since our last visit headaches are a little more frequent/worse in this sense but she has only missed work once and they remain overall not debilitating where she would not want to necessarily increase medications.  Likewise with headache days in total ~15 per month I don't think taper would be recommended either.   She is content with this plan/overall happy with where she was at compared to previously.  She will reach out with issues or changes in the meanwhile but I am hopeful she will continue to feel her headaches are not limiting.      Continue topiramate 50 mg BID, nortriptyline 50 mg at bedtime  Continue nurtec PRN  Continue magnesium, riboflavin  Follow up with me in 8-9 months      Charles eVga MD   of Neurology   [facility]      Interval history:     Remains on topiramate 50 mg BID  Nortriptyline 50 mg at night   No side effects  Sleep: remains beatrice  Trouble is more falling asleep.    Gets hot at night.    15 headache days per month still but a lot more manageable perhaps slightly worse in # of headaches than last time but not bothersome enough to warrant a change in her eyes, I agree.  Called in once in between our visits. Nothing was helping at that time  Usually nurtec helps.  No cost issues.    She is happy with where she is at overall  Have room to go higher if needed.     Her  puppy is doing well, tearing up plants.           Past Medical History:     Patient Active Problem List   Diagnosis     CARDIOVASCULAR SCREENING; LDL GOAL LESS THAN 160     Migraine with aura and without status migrainosus, not intractable     Hashimoto's thyroiditis     Thyroglobulin antibody positive     Alopecia     Fatigue, unspecified type     Poor sleep hygiene     Thyroid nodule     Adult ADHD     Past Medical History:   Diagnosis Date     Allergy     multiple     Ankle sprain 4/10     Arthritis      Depression     in teens -      Hashimoto's thyroiditis      IUD (intrauterine device) in place     mirena 05     Uncomplicated asthma         Past Surgical History:     Past Surgical History:   Procedure Laterality Date     HC TOOTH EXTRACTION W/FORCEP          Social History:     Social History     Tobacco Use     Smoking status: Former     Packs/day: 0.00     Years: 0.00     Pack years: 0.00     Types: Cigarettes     Quit date: 2011     Years since quittin.6     Smokeless tobacco: Never     Tobacco comments:     Lives in smoke free household   Vaping Use     Vaping status: Never Used   Substance Use Topics     Alcohol use: No     Alcohol/week: 0.0 standard drinks of alcohol     Drug use: No        Family History:     Family History   Problem Relation Age of Onset     Alcohol/Drug Mother          MVA alcohol related     Diabetes Father         hypoglycemia     Hypertension Father      Psoriatic Arthritis Father      Psoriasis Father      Graves' disease Sister         Graves, tumor     Hypothyroidism Paternal Grandmother      Cancer Paternal Grandfather         lung cancer     C.A.D. No family hx of      Depression No family hx of      Gastrointestinal Disease No family hx of      Heart Disease No family hx of      Lipids No family hx of      Neurologic Disorder No family hx of      Respiratory No family hx of      Cerebrovascular Disease No family hx of      Glaucoma No family hx of       Macular Degeneration No family hx of         Medications:     Current Outpatient Medications   Medication Sig     albuterol (PROAIR HFA/PROVENTIL HFA/VENTOLIN HFA) 108 (90 Base) MCG/ACT inhaler Inhale 1-2 puffs into the lungs as needed     albuterol (PROVENTIL) (2.5 MG/3ML) 0.083% neb solution Inhale 2.5 mg into the lungs as needed     levothyroxine (SYNTHROID/LEVOTHROID) 25 MCG tablet Take 1 tablet (25 mcg) by mouth daily     methylphenidate (RITALIN LA) 30 MG 24 hr capsule Take 1 capsule (30 mg) by mouth every morning     methylPREDNISolone (MEDROL DOSEPAK) 4 MG tablet therapy pack Follow Package Directions     nortriptyline (PAMELOR) 50 MG capsule TAKE 1 CAPSULE BY MOUTH AT BEDTIME     rimegepant (NURTEC) 75 MG ODT tablet Take 1 tablet (75 mg) by mouth daily as needed (headache)     topiramate (TOPAMAX) 50 MG tablet Take 1 tablet (50 mg) by mouth 2 times daily     No current facility-administered medications for this visit.        Allergies:     Allergies   Allergen Reactions     Wasp Venom Hives     Swelling past two joints     [place]igin[place] Flavor Hives     Red Dye Hives     Retin-A [Tretinoin] Hives        Review of Systems:   As noted above     Physical Exam:   General: Seated comfortably in no acute distress.  Neurologic:     Mental Status: Fully alert, attentive and oriented. Speech clear and fluent, no paraphasic errors.     Cranial Nerves: EOM appear intact. Facial movements symmetric. Hearing not formally tested but intact to conversation.  No dysarthria.     Motor: No tremors or other abnormal movements observed.      Sensory:Not able to be tested virtually                   Deni is a 42 year old who is being evaluated via a billable video visit.      How would you like to obtain your AVS? MyChart  If the video visit is dropped, the invitation should be resent by: Text to cell phone: Headaches// 6 month follow up:   Will anyone else be joining your video visit? No      Video-Visit Details    Type of service:   Video Visit   Time 9:12-9-22 AM (10 minutes)  Originating Location (pt. Location): Work, in [place]  Distant Location (provider location):  On-site  Platform used for Video Visit: ELPIDIO Kent, GINA ([facility])      Again, thank you for allowing me to participate in the care of your patient.        Sincerely,        Lev Vega MD

## 2023-04-26 NOTE — PROGRESS NOTES
Deni is a 42 year old who is being evaluated via a billable video visit.      How would you like to obtain your AVS? MyChart  If the video visit is dropped, the invitation should be resent by: Text to cell phone: Headaches// 6 month follow up:   Will anyone else be joining your video visit? No      Video-Visit Details    Type of service:  Video Visit   Time 9:12-9-22 AM (10 minutes)  Originating Location (pt. Location): Work, in [place]  Distant Location (provider location):  On-site  Platform used for Video Visit: ELPIDIO Kent, GINA ([facility])

## 2023-04-27 ENCOUNTER — TELEPHONE (OUTPATIENT)
Dept: NEUROLOGY | Facility: CLINIC | Age: 43
End: 2023-04-27
Payer: COMMERCIAL

## 2023-04-27 NOTE — TELEPHONE ENCOUNTER
[facility] Call Center    Phone Message    May a detailed message be left on voicemail: yes     Reason for Call: Other: Pt is calling needing to schedule a 9 month f/u with Dr. Vega via video. Writer is unable to schedule due to providers template not being available. Please call pt to schedule    Action Taken: Message routed to:  Other: [facility]    Travel Screening: Not Applicable

## 2023-04-27 NOTE — TELEPHONE ENCOUNTER
Left Voicemail (1st Attempt) for the patient to call back and schedule the following:    Appointment type: Return   Provider: Dr. Vega  Return date: 1/26/2024  Specialty phone number: [phone]  Additional appointment(s) needed:   Additonal Notes:       Return in about 9 months (around 1/26/2024).    Also sent a Front App message.    Arielle R/Procedure    [facility]   Neurology, NeuroSurgery, NeuroPsychology and Pain Management Specialties  Medical/Surgical Adult Specialties

## 2023-04-28 ENCOUNTER — LAB (OUTPATIENT)
Dept: LAB | Facility: CLINIC | Age: 43
End: 2023-04-28
Payer: COMMERCIAL

## 2023-04-28 ENCOUNTER — DOCUMENTATION ONLY (OUTPATIENT)
Dept: LAB | Facility: CLINIC | Age: 43
End: 2023-04-28

## 2023-04-28 DIAGNOSIS — E06.3 HASHIMOTO'S THYROIDITIS: ICD-10-CM

## 2023-04-28 DIAGNOSIS — E06.3 HASHIMOTO'S THYROIDITIS: Primary | ICD-10-CM

## 2023-04-28 DIAGNOSIS — Z79.899 HIGH RISK MEDICATIONS (NOT ANTICOAGULANTS) LONG-TERM USE: ICD-10-CM

## 2023-04-28 LAB
ALBUMIN SERPL-MCNC: 3.5 G/DL (ref 3.4–5)
ALP SERPL-CCNC: 97 U/L (ref 40–150)
ALT SERPL W P-5'-P-CCNC: 32 U/L (ref 0–50)
ANION GAP SERPL CALCULATED.3IONS-SCNC: 6 MMOL/L (ref 3–14)
AST SERPL W P-5'-P-CCNC: 15 U/L (ref 0–45)
BILIRUB SERPL-MCNC: 0.2 MG/DL (ref 0.2–1.3)
BUN SERPL-MCNC: 10 MG/DL (ref 7–30)
CALCIUM SERPL-MCNC: 8.7 MG/DL (ref 8.5–10.1)
CHLORIDE BLD-SCNC: 111 MMOL/L (ref 94–109)
CO2 SERPL-SCNC: 22 MMOL/L (ref 20–32)
CREAT SERPL-MCNC: 0.72 MG/DL (ref 0.52–1.04)
ERYTHROCYTE [DISTWIDTH] IN BLOOD BY AUTOMATED COUNT: 14.6 % (ref 10–15)
GFR SERPL CREATININE-BSD FRML MDRD: >90 ML/MIN/1.73M2
GLUCOSE BLD-MCNC: 110 MG/DL (ref 70–99)
HCT VFR BLD AUTO: 39.7 % (ref 35–47)
HGB BLD-MCNC: 13.1 G/DL (ref 11.7–15.7)
MCH RBC QN AUTO: 27.1 PG (ref 26.5–33)
MCHC RBC AUTO-ENTMCNC: 33 G/DL (ref 31.5–36.5)
MCV RBC AUTO: 82 FL (ref 78–100)
PLATELET # BLD AUTO: 401 10E3/UL (ref 150–450)
POTASSIUM BLD-SCNC: 3.4 MMOL/L (ref 3.4–5.3)
PROT SERPL-MCNC: 7.8 G/DL (ref 6.8–8.8)
RBC # BLD AUTO: 4.84 10E6/UL (ref 3.8–5.2)
SODIUM SERPL-SCNC: 139 MMOL/L (ref 133–144)
TSH SERPL DL<=0.005 MIU/L-ACNC: 1.65 MU/L (ref 0.4–4)
WBC # BLD AUTO: 5 10E3/UL (ref 4–11)

## 2023-04-28 PROCEDURE — 84443 ASSAY THYROID STIM HORMONE: CPT

## 2023-04-28 PROCEDURE — 85027 COMPLETE CBC AUTOMATED: CPT

## 2023-04-28 PROCEDURE — 80053 COMPREHEN METABOLIC PANEL: CPT

## 2023-04-28 PROCEDURE — 36415 COLL VENOUS BLD VENIPUNCTURE: CPT

## 2023-04-28 NOTE — PROGRESS NOTES
Deni was seen in lab today and was requesting her thyroid testing to be done as well, please place add on orders as needed thank you!     Syl Mo

## 2023-06-21 ENCOUNTER — MYC REFILL (OUTPATIENT)
Dept: FAMILY MEDICINE | Facility: CLINIC | Age: 43
End: 2023-06-21
Payer: COMMERCIAL

## 2023-06-22 RX ORDER — ALBUTEROL SULFATE 90 UG/1
1-2 AEROSOL, METERED RESPIRATORY (INHALATION) PRN
Qty: 18 G | Refills: 3 | OUTPATIENT
Start: 2023-06-22

## 2023-07-05 ENCOUNTER — OFFICE VISIT (OUTPATIENT)
Dept: FAMILY MEDICINE | Facility: CLINIC | Age: 43
End: 2023-07-05
Payer: COMMERCIAL

## 2023-07-05 VITALS
RESPIRATION RATE: 16 BRPM | HEART RATE: 86 BPM | SYSTOLIC BLOOD PRESSURE: 128 MMHG | OXYGEN SATURATION: 99 % | BODY MASS INDEX: 39.38 KG/M2 | DIASTOLIC BLOOD PRESSURE: 85 MMHG | WEIGHT: 233 LBS | TEMPERATURE: 97.8 F

## 2023-07-05 DIAGNOSIS — F90.9 ADULT ADHD: ICD-10-CM

## 2023-07-05 DIAGNOSIS — J45.20 MILD INTERMITTENT ASTHMA WITHOUT COMPLICATION: ICD-10-CM

## 2023-07-05 DIAGNOSIS — M79.641 PAIN IN BOTH HANDS: ICD-10-CM

## 2023-07-05 DIAGNOSIS — E06.3 HASHIMOTO'S THYROIDITIS: ICD-10-CM

## 2023-07-05 DIAGNOSIS — M79.642 PAIN IN BOTH HANDS: ICD-10-CM

## 2023-07-05 DIAGNOSIS — Z00.00 ROUTINE GENERAL MEDICAL EXAMINATION AT A HEALTH CARE FACILITY: Primary | ICD-10-CM

## 2023-07-05 PROCEDURE — 87624 HPV HI-RISK TYP POOLED RSLT: CPT | Performed by: FAMILY MEDICINE

## 2023-07-05 PROCEDURE — 99396 PREV VISIT EST AGE 40-64: CPT | Performed by: FAMILY MEDICINE

## 2023-07-05 PROCEDURE — G0145 SCR C/V CYTO,THINLAYER,RESCR: HCPCS | Performed by: FAMILY MEDICINE

## 2023-07-05 PROCEDURE — 99214 OFFICE O/P EST MOD 30 MIN: CPT | Mod: 25 | Performed by: FAMILY MEDICINE

## 2023-07-05 RX ORDER — LEVOTHYROXINE SODIUM 25 UG/1
25 TABLET ORAL DAILY
Qty: 90 TABLET | Refills: 3 | Status: SHIPPED | OUTPATIENT
Start: 2023-07-05 | End: 2023-12-03

## 2023-07-05 RX ORDER — ALBUTEROL SULFATE 90 UG/1
1-2 AEROSOL, METERED RESPIRATORY (INHALATION) PRN
Qty: 18 G | Refills: 3 | Status: SHIPPED | OUTPATIENT
Start: 2023-07-05 | End: 2023-07-07

## 2023-07-05 RX ORDER — DEXTROAMPHETAMINE SACCHARATE, AMPHETAMINE ASPARTATE MONOHYDRATE, DEXTROAMPHETAMINE SULFATE AND AMPHETAMINE SULFATE 3.75; 3.75; 3.75; 3.75 MG/1; MG/1; MG/1; MG/1
15 CAPSULE, EXTENDED RELEASE ORAL DAILY
Qty: 30 CAPSULE | Refills: 0 | Status: SHIPPED | OUTPATIENT
Start: 2023-07-05 | End: 2023-08-08

## 2023-07-05 ASSESSMENT — ENCOUNTER SYMPTOMS
HEADACHES: 1
NAUSEA: 0
EYE PAIN: 0
WEAKNESS: 0
BREAST MASS: 0
HEARTBURN: 0
ABDOMINAL PAIN: 0
NERVOUS/ANXIOUS: 0
SHORTNESS OF BREATH: 0
PARESTHESIAS: 0
FEVER: 0
DIZZINESS: 0
MYALGIAS: 0
DYSURIA: 0
ARTHRALGIAS: 1
SORE THROAT: 0
CHILLS: 0
JOINT SWELLING: 0
COUGH: 0
HEMATOCHEZIA: 0
PALPITATIONS: 0
CONSTIPATION: 0
DIARRHEA: 0
HEMATURIA: 0
FREQUENCY: 0

## 2023-07-05 ASSESSMENT — PAIN SCALES - GENERAL: PAINLEVEL: NO PAIN (0)

## 2023-07-05 NOTE — PROGRESS NOTES
SUBJECTIVE:   CC: Deni is an 42 year old who presents for preventive health visit.        No data to display              Healthy Habits:     Getting at least 3 servings of Calcium per day:  Yes    Bi-annual eye exam:  NO    Dental care twice a year:  NO    Sleep apnea or symptoms of sleep apnea:  Daytime drowsiness    Diet:  Gluten-free/reduced    Frequency of exercise:  1 day/week    Duration of exercise:  Less than 15 minutes    Taking medications regularly:  Yes    Medication side effects:  None    Additional concerns today:  Yes                  Social History     Tobacco Use     Smoking status: Former     Packs/day: 0.00     Years: 0.00     Pack years: 0.00     Types: Cigarettes     Quit date: 2011     Years since quittin.8     Smokeless tobacco: Never     Tobacco comments:     Lives in smoke free household   Substance Use Topics     Alcohol use: No             2023     8:30 AM   Alcohol Use   Prescreen: >3 drinks/day or >7 drinks/week? No          No data to display              Reviewed orders with patient.  Reviewed health maintenance and updated orders accordingly - Yes    G 3 P 2   Patient's last menstrual period was 2023 (exact date).     Fasting: No   Td: tdap 2013 due       Flu: declined      Covid: mod #2 2021      Shingrix: NA      PPV: NA      Last 3 Pap and HPV Results:       Latest Ref Rng & Units 10/1/2018     9:50 AM 10/1/2018     9:45 AM 9/10/2015    12:00 AM   PAP / HPV   PAP (Historical)   NIL  NIL    HPV 16 DNA NEG^Negative Negative      HPV 18 DNA NEG^Negative Negative      Other HR HPV NEG^Negative Negative                     Cholesterol:   Lab Results   Component Value Date    CHOL 220 2020     Lab Results   Component Value Date    HDL 48 2020     Lab Results   Component Value Date     2020     Lab Results   Component Value Date    TRIG 129 2020     Lab Results   Component Value Date    CHOLHDLRATIO 4.4 2010          MMG: NA  Dexa:  NA     Flex/colo: NA      Seat Belt: Yes    Sunscreen use: Yes   Calcium Intake: adeq  Health Care Directive: Yes   Sexually Active: Yes     Current contraception: condoms  History of abnormal Pap smear: No  Family history of colon/breast/ovarian cancer: No  Regular self breast exam: Yes  History of abnormal mammogram: No      Labs reviewed in EPIC  BP Readings from Last 3 Encounters:   23 128/85   23 136/82   22 129/85    Wt Readings from Last 3 Encounters:   23 105.7 kg (233 lb)   23 106.6 kg (235 lb)   22 98 kg (216 lb)                  Patient Active Problem List   Diagnosis     CARDIOVASCULAR SCREENING; LDL GOAL LESS THAN 160     Migraine with aura and without status migrainosus, not intractable     Hashimoto's thyroiditis     Thyroglobulin antibody positive     Alopecia     Fatigue, unspecified type     Poor sleep hygiene     Thyroid nodule     Adult ADHD     Past Surgical History:   Procedure Laterality Date     HC TOOTH EXTRACTION W/FORCEP         Social History     Tobacco Use     Smoking status: Former     Packs/day: 0.00     Years: 0.00     Pack years: 0.00     Types: Cigarettes     Quit date: 2011     Years since quittin.8     Smokeless tobacco: Never     Tobacco comments:     Lives in smoke free household   Substance Use Topics     Alcohol use: No     Family History   Problem Relation Age of Onset     Alcohol/Drug Mother          MVA alcohol related     Diabetes Father         hypoglycemia     Hypertension Father      Psoriatic Arthritis Father      Psoriasis Father      Graves' disease Sister         Graves, tumor     Cerebrovascular Disease Maternal Grandmother      Hypothyroidism Paternal Grandmother      Cancer Paternal Grandfather         lung cancer     C.A.D. No family hx of      Depression No family hx of      Gastrointestinal Disease No family hx of      Heart Disease No family hx of      Lipids No family hx of       Neurologic Disorder No family hx of      Respiratory No family hx of      Glaucoma No family hx of      Macular Degeneration No family hx of          Current Outpatient Medications   Medication Sig Dispense Refill     albuterol (PROAIR HFA/PROVENTIL HFA/VENTOLIN HFA) 108 (90 Base) MCG/ACT inhaler Inhale 1-2 puffs into the lungs as needed       albuterol (PROVENTIL) (2.5 MG/3ML) 0.083% neb solution Inhale 2.5 mg into the lungs as needed       levothyroxine (SYNTHROID/LEVOTHROID) 25 MCG tablet TAKE 1 TABLET BY MOUTH EVERY DAY 90 tablet 0     methylphenidate (RITALIN LA) 30 MG 24 hr capsule Take 1 capsule (30 mg) by mouth every morning 30 capsule 0     methylPREDNISolone (MEDROL DOSEPAK) 4 MG tablet therapy pack Follow Package Directions 21 tablet 0     nortriptyline (PAMELOR) 50 MG capsule TAKE 1 CAPSULE BY MOUTH AT BEDTIME 90 capsule 2     rimegepant (NURTEC) 75 MG ODT tablet Take 1 tablet (75 mg) by mouth daily as needed (headache) 9 tablet 8     topiramate (TOPAMAX) 50 MG tablet Take 1 tablet (50 mg) by mouth 2 times daily 180 tablet 2       Breast Cancer Screenin/22/2022    10:30 AM   Breast CA Risk Assessment (FHS-7)   Do you have a family history of breast, colon, or ovarian cancer? No / Unknown         Patient under 40 years of age: Routine Mammogram Screening not recommended.   Pertinent mammograms are reviewed under the imaging tab.      Reviewed and updated as needed this visit by clinical staff   Tobacco  Allergies  Meds  Problems  Med Hx  Surg Hx  Fam Hx          Reviewed and updated as needed this visit by Provider   Tobacco  Allergies  Meds  Problems  Med Hx  Surg Hx  Fam Hx             Review of Systems   Constitutional: Negative for chills and fever.   HENT: Positive for congestion. Negative for ear pain, hearing loss and sore throat.    Eyes: Negative for pain and visual disturbance.   Respiratory: Negative for cough and shortness of breath.    Cardiovascular: Negative for  chest pain, palpitations and peripheral edema.   Gastrointestinal: Negative for abdominal pain, constipation, diarrhea, heartburn, hematochezia and nausea.   Breasts:  Negative for tenderness, breast mass and discharge.   Genitourinary: Negative for dysuria, frequency, genital sores, hematuria, pelvic pain, urgency, vaginal bleeding and vaginal discharge.   Musculoskeletal: Positive for arthralgias. Negative for joint swelling and myalgias.   Skin: Negative for rash.   Neurological: Positive for headaches. Negative for dizziness, weakness and paresthesias.   Psychiatric/Behavioral: Negative for mood changes. The patient is not nervous/anxious.           OBJECTIVE:   /85   Pulse 86   Temp 97.8  F (36.6  C) (Tympanic)   Resp 16   Wt 105.7 kg (233 lb)   LMP 06/22/2023 (Exact Date)   SpO2 99%   Breastfeeding No   BMI 39.38 kg/m    Physical Exam  GENERAL: healthy, alert and no distress  EYES: Eyes grossly normal to inspection, PERRL and conjunctivae and sclerae normal  HENT: ear canals and TM's normal, nose and mouth without ulcers or lesions  NECK: no adenopathy, no asymmetry, masses, or scars and thyroid normal to palpation  RESP: lungs clear to auscultation - no rales, rhonchi or wheezes  BREAST: normal without masses, tenderness or nipple discharge and no palpable axillary masses or adenopathy  CV: regular rate and rhythm, normal S1 S2, no S3 or S4, no murmur, click or rub, no peripheral edema and peripheral pulses strong  ABDOMEN: soft, nontender, no hepatosplenomegaly, no masses and bowel sounds normal   (female): normal female external genitalia, normal urethral meatus, vaginal mucosa pink, moist, well rugated, and normal cervix/adnexa/uterus without masses or discharge  MS: no gross musculoskeletal defects noted, no edema  SKIN: no suspicious lesions or rashes  NEURO: Normal strength and tone, mentation intact and speech normal  PSYCH: mentation appears normal, affect normal/bright    Diagnostic  "Test Results:  Labs reviewed in Epic    ASSESSMENT/PLAN:   (Z00.00) Routine general medical examination at a health care facility  (primary encounter diagnosis)  Comment: preventive needs reviewed   Plan: Pap Screen with HPV - recommended age 30 - 65         years        see orders in Epic.     (E06.3) Hashimoto's thyroiditis  Comment: stable and to goal  Plan: levothyroxine (SYNTHROID/LEVOTHROID) 25 MCG         tablet, OFFICE/OUTPT VISIT,EST,LEVL IV        Refill x 1 yr     (F90.9) Adult ADHD  Comment: has tried several meds, see prob list, would like to try a different med  Plan: amphetamine-dextroamphetamine (ADDERALL XR) 15         MG 24 hr capsule, OFFICE/OUTPT VISIT,EST,LEVL         IV        adderall sent, consider vyvanse if not helpful    (J45.20) Mild intermittent asthma without complication  Comment: stable, increased albuterol use with air quality  Plan: albuterol (PROAIR HFA/PROVENTIL HFA/VENTOLIN         HFA) 108 (90 Base) MCG/ACT inhaler,         OFFICE/OUTPT VISIT,EST,LEVL IV         Refill x 6 months     (M79.641,  M79.642) Pain in both hands  Comment: initial labs neg except MARISELA  Plan: nabumetone (RELAFEN) 750 MG tablet, Adult         Rheumatology  Referral, OFFICE/OUTPT         VISIT,EST,LEVL IV        Refer to rheum, fam hx psoriasis with arthritis, pt with hashimotos  Trial of relafen daily to see if helpful            COUNSELING:  Reviewed preventive health counseling, as reflected in patient instructions  Special attention given to:        Regular exercise       Healthy diet/nutrition      BMI:   Estimated body mass index is 39.38 kg/m  as calculated from the following:    Height as of 2/28/23: 1.638 m (5' 4.5\").    Weight as of this encounter: 105.7 kg (233 lb).   Weight management plan: Discussed healthy diet and exercise guidelines      She reports that she quit smoking about 11 years ago. Her smoking use included cigarettes. She has never used smokeless tobacco.          Radha RICKETTS" MD [place]  [facility]

## 2023-07-07 ENCOUNTER — TELEPHONE (OUTPATIENT)
Dept: FAMILY MEDICINE | Facility: CLINIC | Age: 43
End: 2023-07-07
Payer: COMMERCIAL

## 2023-07-07 DIAGNOSIS — J45.20 MILD INTERMITTENT ASTHMA WITHOUT COMPLICATION: ICD-10-CM

## 2023-07-07 LAB
BKR LAB AP GYN ADEQUACY: NORMAL
BKR LAB AP GYN INTERPRETATION: NORMAL
BKR LAB AP HPV REFLEX: NORMAL
BKR LAB AP PREVIOUS ABNORMAL: NORMAL
PATH REPORT.COMMENTS IMP SPEC: NORMAL
PATH REPORT.COMMENTS IMP SPEC: NORMAL
PATH REPORT.RELEVANT HX SPEC: NORMAL

## 2023-07-07 RX ORDER — ALBUTEROL SULFATE 90 UG/1
1-2 AEROSOL, METERED RESPIRATORY (INHALATION) EVERY 4 HOURS PRN
Qty: 18 G | Refills: 3 | Status: SHIPPED | OUTPATIENT
Start: 2023-07-07 | End: 2024-08-28

## 2023-07-07 NOTE — TELEPHONE ENCOUNTER
Fax message: Need to know frequency for Insurance. Is she using daily, Q4-6 hours, etc?    Drug: albuterol

## 2023-07-11 LAB
HUMAN PAPILLOMA VIRUS 16 DNA: NEGATIVE
HUMAN PAPILLOMA VIRUS 18 DNA: NEGATIVE
HUMAN PAPILLOMA VIRUS FINAL DIAGNOSIS: NORMAL
HUMAN PAPILLOMA VIRUS OTHER HR: NEGATIVE

## 2023-08-08 ENCOUNTER — MYC REFILL (OUTPATIENT)
Dept: FAMILY MEDICINE | Facility: CLINIC | Age: 43
End: 2023-08-08
Payer: COMMERCIAL

## 2023-08-08 DIAGNOSIS — F90.9 ADULT ADHD: ICD-10-CM

## 2023-08-09 RX ORDER — DEXTROAMPHETAMINE SACCHARATE, AMPHETAMINE ASPARTATE MONOHYDRATE, DEXTROAMPHETAMINE SULFATE AND AMPHETAMINE SULFATE 5; 5; 5; 5 MG/1; MG/1; MG/1; MG/1
20 CAPSULE, EXTENDED RELEASE ORAL DAILY
Qty: 30 CAPSULE | Refills: 0 | Status: SHIPPED | OUTPATIENT
Start: 2023-08-09 | End: 2024-08-28

## 2023-09-05 DIAGNOSIS — M79.641 PAIN IN BOTH HANDS: ICD-10-CM

## 2023-09-05 DIAGNOSIS — M79.642 PAIN IN BOTH HANDS: ICD-10-CM

## 2023-11-20 DIAGNOSIS — M79.641 PAIN IN BOTH HANDS: ICD-10-CM

## 2023-11-20 DIAGNOSIS — M79.642 PAIN IN BOTH HANDS: ICD-10-CM

## 2023-12-03 ENCOUNTER — MYC REFILL (OUTPATIENT)
Dept: FAMILY MEDICINE | Facility: CLINIC | Age: 43
End: 2023-12-03
Payer: COMMERCIAL

## 2023-12-03 DIAGNOSIS — E06.3 HASHIMOTO'S THYROIDITIS: ICD-10-CM

## 2023-12-04 RX ORDER — LEVOTHYROXINE SODIUM 25 UG/1
25 TABLET ORAL DAILY
Qty: 90 TABLET | Refills: 1 | Status: SHIPPED | OUTPATIENT
Start: 2023-12-04 | End: 2024-05-14

## 2023-12-14 DIAGNOSIS — E06.3 HASHIMOTO'S THYROIDITIS: ICD-10-CM

## 2023-12-14 RX ORDER — LEVOTHYROXINE SODIUM 25 UG/1
25 TABLET ORAL DAILY
Qty: 90 TABLET | Refills: 1 | OUTPATIENT
Start: 2023-12-14

## 2024-01-25 ENCOUNTER — TELEPHONE (OUTPATIENT)
Dept: FAMILY MEDICINE | Facility: CLINIC | Age: 44
End: 2024-01-25
Payer: COMMERCIAL

## 2024-01-25 NOTE — TELEPHONE ENCOUNTER
Forms/Letter Request    Type of form/letter: OTHER: Family medicine Insurance form        Do we have the form/letter: Yes:     Who is the form from? Patient    Where did/will the form come from? Patient or family brought in       When is form/letter needed by: 1/30/24    How would you like the form/letter returned: , Deni's Daughter Paula will pick this form up     Patient Notified form requests are processed in 5-7 business days:Yes    Could we send this information to you in [email or web address] or would you prefer to receive a phone call?:   No preference   Okay to leave a detailed message?: Yes at Home number on file [phone] (home)

## 2024-01-29 NOTE — PROGRESS NOTES
[facility]  Section of General Neurology  Return Patient  Virtual Visit    Deni Simental MRN# [id number]   Age: 43 year old YOB: 1980          Assessment and Plan:   Assessment:  Deni Simental is a very pleasant 43 year old female seen in follow up for migraine headaches.  They have worsened overall since our last visit but specifically catamenially are much worse.  She may think about hormone therapy in this regard.  Discussed risk of clotting/stroke correlates with estrogen content, as a non smoker without much migraine aura she is lower risk overall but data on this is mixed/not an absolute contraindications.  For migraines will make changes as below, I'm optimistic we can get her better controlled again.  Side effects of topiramate re discussed.      Plan:  Increase topamax to 50 mg qam 100 mg at bedtime x2 weeks then go to 100 mg twice a day  Keep nortriptyline at same dose (50 mg)  Continue nurtec PRN  Add frovatriptan starting day before period twice a day x 4-6 days as needed/tolerated    Future ideas: Nurtec preventatively, Ubrelvy as needed.  Medrol dose pack to break cycle     Follow up in ~3 months to reach out sooner if not better      Video data:  10: AM on video itself  Used: case  Pt location: [facility]  Provider location on site      Charles Vega MD   of Neurology   [facility]      Interval history:     No job changes  Reading a lot lately, political fantasy  Headaches are not good/worse.   Migraines are worsening with periods.  Nurtec doesn't help period related migraines.    More tension/migraine overall  Nearly daily now.    Nurtec -keep using as needed    Naproxen twice a day the day before your period x 3-4 days total--shouldn't do on Nabumatone  Frovatriptan also discussed.     Discussed newer hormone replacement data as it relates to migraines    A/P at previous visit  Deni Simental is a  very pleasant 42 year old female seen in follow up for migraine headaches.  Since our last visit headaches are a little more frequent/worse in this sense but she has only missed work once and they remain overall not debilitating where she would not want to necessarily increase medications.  Likewise with headache days in total ~15 per month I don't think taper would be recommended either.   She is content with this plan/overall happy with where she was at compared to previously.  She will reach out with issues or changes in the meanwhile but I am hopeful she will continue to feel her headaches are not limiting.      Continue topiramate 50 mg BID, nortriptyline 50 mg at bedtime  Continue nurtec PRN  Continue magnesium, riboflavin  Follow up with me in 8-9 months      Past Medical History:     Patient Active Problem List   Diagnosis    CARDIOVASCULAR SCREENING; LDL GOAL LESS THAN 160    Migraine with aura and without status migrainosus, not intractable    Hashimoto's thyroiditis    Thyroglobulin antibody positive    Alopecia    Fatigue, unspecified type    Poor sleep hygiene    Thyroid nodule    Adult ADHD     Past Medical History:   Diagnosis Date    Allergy     multiple    Ankle sprain 4/10    Arthritis     Depression     in teens -     Hashimoto's thyroiditis     IUD (intrauterine device) in place     mirena 05    Uncomplicated asthma         Past Surgical History:     Past Surgical History:   Procedure Laterality Date    HC TOOTH EXTRACTION W/FORCEP          Social History:     Social History     Tobacco Use    Smoking status: Former     Packs/day: 0.00     Years: 0.00     Additional pack years: 0.00     Total pack years: 0.00     Types: Cigarettes     Quit date: 2011     Years since quittin.3    Smokeless tobacco: Never    Tobacco comments:     Lives in smoke free household   Vaping Use    Vaping Use: Never used   Substance Use Topics    Alcohol use: No    Drug use: No        Family History:      Family History   Problem Relation Age of Onset    Alcohol/Drug Mother          MVA alcohol related    Diabetes Father         hypoglycemia    Hypertension Father     Psoriatic Arthritis Father     Psoriasis Father     Graves' disease Sister         Graves, tumor    Cerebrovascular Disease Maternal Grandmother     Hypothyroidism Paternal Grandmother     Cancer Paternal Grandfather         lung cancer    C.A.D. No family hx of     Depression No family hx of     Gastrointestinal Disease No family hx of     Heart Disease No family hx of     Lipids No family hx of     Neurologic Disorder No family hx of     Respiratory No family hx of     Glaucoma No family hx of     Macular Degeneration No family hx of         Medications:     Current Outpatient Medications   Medication Sig    albuterol (PROAIR HFA/PROVENTIL HFA/VENTOLIN HFA) 108 (90 Base) MCG/ACT inhaler Inhale 1-2 puffs into the lungs every 4 hours as needed for shortness of breath, wheezing or cough    albuterol (PROVENTIL) (2.5 MG/3ML) 0.083% neb solution Inhale 2.5 mg into the lungs as needed    amphetamine-dextroamphetamine (ADDERALL XR) 20 MG 24 hr capsule Take 1 capsule (20 mg) by mouth daily    levothyroxine (SYNTHROID/LEVOTHROID) 25 MCG tablet Take 1 tablet (25 mcg) by mouth daily    nabumetone (RELAFEN) 750 MG tablet TAKE 1 TABLET BY MOUTH TWICE A DAY    nortriptyline (PAMELOR) 50 MG capsule TAKE 1 CAPSULE BY MOUTH AT BEDTIME    rimegepant (NURTEC) 75 MG ODT tablet Take 1 tablet (75 mg) by mouth daily as needed (headache)    topiramate (TOPAMAX) 50 MG tablet Take 1 tablet (50 mg) by mouth 2 times daily     No current facility-administered medications for this visit.        Allergies:     Allergies   Allergen Reactions    Wasp Venom Hives     Swelling past two joints    [place] Flavor Hives    Red Dye Hives    Retin-A [Tretinoin] Hives        Review of Systems:   As noted above     Physical Exam:   General: Seated comfortably in no acute  distress.  Neurologic:     Mental Status: Fully alert, attentive and oriented. Speech clear and fluent, no paraphasic errors.     Cranial Nerves: EOM appear intact. Facial movements symmetric. Hearing not formally tested but intact to conversation.  No dysarthria.     Motor: No tremors or other abnormal movements observed.      Sensory:Not able to be tested virtually      The longitudinal plan of care for migraine headaches was addressed during this visit. Due to the added complexity in care, I will continue to support Ms. Simental  in the subsequent management of this condition(s) and with the ongoing continuity of care of this condition(s).

## 2024-01-31 ENCOUNTER — VIRTUAL VISIT (OUTPATIENT)
Dept: NEUROLOGY | Facility: CLINIC | Age: 44
End: 2024-01-31
Payer: COMMERCIAL

## 2024-01-31 DIAGNOSIS — G43.019 INTRACTABLE MIGRAINE WITHOUT AURA AND WITHOUT STATUS MIGRAINOSUS: Primary | ICD-10-CM

## 2024-01-31 DIAGNOSIS — G43.839 INTRACTABLE MENSTRUAL MIGRAINE WITHOUT STATUS MIGRAINOSUS: ICD-10-CM

## 2024-01-31 PROCEDURE — G2211 COMPLEX E/M VISIT ADD ON: HCPCS | Mod: 95 | Performed by: STUDENT IN AN ORGANIZED HEALTH CARE EDUCATION/TRAINING PROGRAM

## 2024-01-31 PROCEDURE — 99214 OFFICE O/P EST MOD 30 MIN: CPT | Mod: 95 | Performed by: STUDENT IN AN ORGANIZED HEALTH CARE EDUCATION/TRAINING PROGRAM

## 2024-01-31 RX ORDER — TOPIRAMATE 100 MG/1
TABLET, FILM COATED ORAL
Qty: 180 TABLET | Refills: 1 | Status: SHIPPED | OUTPATIENT
Start: 2024-01-31 | End: 2024-07-30

## 2024-01-31 RX ORDER — NORTRIPTYLINE HYDROCHLORIDE 50 MG/1
CAPSULE ORAL
Qty: 90 CAPSULE | Refills: 2 | Status: SHIPPED | OUTPATIENT
Start: 2024-01-31 | End: 2024-08-28

## 2024-01-31 RX ORDER — FROVATRIPTAN SUCCINATE 2.5 MG/1
TABLET, FILM COATED ORAL
Qty: 12 TABLET | Refills: 5 | Status: SHIPPED | OUTPATIENT
Start: 2024-01-31 | End: 2024-08-28

## 2024-01-31 NOTE — PATIENT INSTRUCTIONS
Increase topamax to 50 mg qam 100 mg at bedtime x2 weeks then go to 100 mg twice a day  Keep nortriptyline at same dose (50 mg)  Continue nurtec PRN  Add frovatriptan starting day before period twice a day x 4-6 days as needed/tolerated  Reach out with questions in between visits

## 2024-01-31 NOTE — LETTER
1/31/2024         RE: Deni Simental  [address] [place] [id number]        Dear Colleague,    Thank you for referring your patient, Deni Simental, to the [facility] [place]. Please see a copy of my visit note below.    [facility]  Section of General Neurology  Return Patient  Virtual Visit    Deni Simental MRN# [id number]   Age: 43 year old YOB: 1980          Assessment and Plan:   Assessment:  Deni Simental is a very pleasant 43 year old female seen in follow up for migraine headaches.  They have worsened overall since our last visit but specifically catamenially are much worse.  She may think about hormone therapy in this regard.  Discussed risk of clotting/stroke correlates with estrogen content, as a non smoker without much migraine aura she is lower risk overall but data on this is mixed/not an absolute contraindications.  For migraines will make changes as below, I'm optimistic we can get her better controlled again.  Side effects of topiramate re discussed.      Plan:  Increase topamax to 50 mg qam 100 mg at bedtime x2 weeks then go to 100 mg twice a day  Keep nortriptyline at same dose (50 mg)  Continue nurtec PRN  Add frovatriptan starting day before period twice a day x 4-6 days as needed/tolerated    Future ideas: Nurtec preventatively, Ubrelvy as needed.  Medrol dose pack to break cycle     Follow up in ~3 months to reach out sooner if not better      Video data:  10: AM on video itself  Used: case  Pt location: [facility]  Provider location on site      Charles Vega MD   of Neurology   [facility]      Interval history:     No job changes  Reading a lot lately, political fantasy  Headaches are not good/worse.   Migraines are worsening with periods.  Nurtec doesn't help period related migraines.    More tension/migraine overall  Nearly daily now.     Nurtec -keep using as needed    Naproxen twice a day the day before your period x 3-4 days total--shouldn't do on Nabumatone  Frovatriptan also discussed.     Discussed newer hormone replacement data as it relates to migraines    A/P at previous visit  Deni Simental is a very pleasant 42 year old female seen in follow up for migraine headaches.  Since our last visit headaches are a little more frequent/worse in this sense but she has only missed work once and they remain overall not debilitating where she would not want to necessarily increase medications.  Likewise with headache days in total ~15 per month I don't think taper would be recommended either.   She is content with this plan/overall happy with where she was at compared to previously.  She will reach out with issues or changes in the meanwhile but I am hopeful she will continue to feel her headaches are not limiting.      Continue topiramate 50 mg BID, nortriptyline 50 mg at bedtime  Continue nurtec PRN  Continue magnesium, riboflavin  Follow up with me in 8-9 months      Past Medical History:     Patient Active Problem List   Diagnosis     CARDIOVASCULAR SCREENING; LDL GOAL LESS THAN 160     Migraine with aura and without status migrainosus, not intractable     Hashimoto's thyroiditis     Thyroglobulin antibody positive     Alopecia     Fatigue, unspecified type     Poor sleep hygiene     Thyroid nodule     Adult ADHD     Past Medical History:   Diagnosis Date     Allergy     multiple     Ankle sprain 4/10     Arthritis      Depression     in teens -      Hashimoto's thyroiditis      IUD (intrauterine device) in place     mirena 6/20/05     Uncomplicated asthma         Past Surgical History:     Past Surgical History:   Procedure Laterality Date     HC TOOTH EXTRACTION W/FORCEP          Social History:     Social History     Tobacco Use     Smoking status: Former     Packs/day: 0.00     Years: 0.00     Additional pack years: 0.00     Total pack  years: 0.00     Types: Cigarettes     Quit date: 2011     Years since quittin.3     Smokeless tobacco: Never     Tobacco comments:     Lives in smoke free household   Vaping Use     Vaping Use: Never used   Substance Use Topics     Alcohol use: No     Drug use: No        Family History:     Family History   Problem Relation Age of Onset     Alcohol/Drug Mother          MVA alcohol related     Diabetes Father         hypoglycemia     Hypertension Father      Psoriatic Arthritis Father      Psoriasis Father      Graves' disease Sister         Graves, tumor     Cerebrovascular Disease Maternal Grandmother      Hypothyroidism Paternal Grandmother      Cancer Paternal Grandfather         lung cancer     C.A.D. No family hx of      Depression No family hx of      Gastrointestinal Disease No family hx of      Heart Disease No family hx of      Lipids No family hx of      Neurologic Disorder No family hx of      Respiratory No family hx of      Glaucoma No family hx of      Macular Degeneration No family hx of         Medications:     Current Outpatient Medications   Medication Sig     albuterol (PROAIR HFA/PROVENTIL HFA/VENTOLIN HFA) 108 (90 Base) MCG/ACT inhaler Inhale 1-2 puffs into the lungs every 4 hours as needed for shortness of breath, wheezing or cough     albuterol (PROVENTIL) (2.5 MG/3ML) 0.083% neb solution Inhale 2.5 mg into the lungs as needed     amphetamine-dextroamphetamine (ADDERALL XR) 20 MG 24 hr capsule Take 1 capsule (20 mg) by mouth daily     levothyroxine (SYNTHROID/LEVOTHROID) 25 MCG tablet Take 1 tablet (25 mcg) by mouth daily     nabumetone (RELAFEN) 750 MG tablet TAKE 1 TABLET BY MOUTH TWICE A DAY     nortriptyline (PAMELOR) 50 MG capsule TAKE 1 CAPSULE BY MOUTH AT BEDTIME     rimegepant (NURTEC) 75 MG ODT tablet Take 1 tablet (75 mg) by mouth daily as needed (headache)     topiramate (TOPAMAX) 50 MG tablet Take 1 tablet (50 mg) by mouth 2 times daily     No current  facility-administered medications for this visit.        Allergies:     Allergies   Allergen Reactions     Wasp Venom Hives     Swelling past two joints     [place] Flavor Hives     Red Dye Hives     Retin-A [Tretinoin] Hives        Review of Systems:   As noted above     Physical Exam:   General: Seated comfortably in no acute distress.  Neurologic:     Mental Status: Fully alert, attentive and oriented. Speech clear and fluent, no paraphasic errors.     Cranial Nerves: EOM appear intact. Facial movements symmetric. Hearing not formally tested but intact to conversation.  No dysarthria.     Motor: No tremors or other abnormal movements observed.      Sensory:Not able to be tested virtually      The longitudinal plan of care for migraine headaches was addressed during this visit. Due to the added complexity in care, I will continue to support Ms. Simental  in the subsequent management of this condition(s) and with the ongoing continuity of care of this condition(s).      Deni is a 43 year old who is being evaluated via a billable video visit.      How would you like to obtain your AVS? MyChart  If the video visit is dropped, the invitation should be resent by: Text to cell phone: [phone]  Will anyone else be joining your video visit? No        Video-Visit Details    Type of service:  Video Visit     Originating Location (pt. Location):     Distant Location (provider location):    Platform used for Video Visit:   ELPIDIO Carreno, GINA ([facility])        Again, thank you for allowing me to participate in the care of your patient.        Sincerely,        Lev Vega MD

## 2024-01-31 NOTE — PROGRESS NOTES
Deni is a 43 year old who is being evaluated via a billable video visit.      How would you like to obtain your AVS? MyChart  If the video visit is dropped, the invitation should be resent by: Text to cell phone: [phone]  Will anyone else be joining your video visit? No        Video-Visit Details    Type of service:  Video Visit     Originating Location (pt. Location):     [place]istant Location (provider location):    Platform used for Video Visit:   ELPIDIO Carreno, GINA ([facility])

## 2024-01-31 NOTE — TELEPHONE ENCOUNTER
Completed FMLA forms placed at [place]  for pickup; copy placed in black folder, copy sent to scanning.  Henny GONZALES    [facility]

## 2024-02-10 ENCOUNTER — HEALTH MAINTENANCE LETTER (OUTPATIENT)
Age: 44
End: 2024-02-10

## 2024-02-26 ENCOUNTER — OFFICE VISIT (OUTPATIENT)
Dept: FAMILY MEDICINE | Facility: CLINIC | Age: 44
End: 2024-02-26
Payer: COMMERCIAL

## 2024-02-26 VITALS
RESPIRATION RATE: 18 BRPM | WEIGHT: 220.2 LBS | HEART RATE: 99 BPM | OXYGEN SATURATION: 96 % | HEIGHT: 66 IN | SYSTOLIC BLOOD PRESSURE: 141 MMHG | BODY MASS INDEX: 35.39 KG/M2 | DIASTOLIC BLOOD PRESSURE: 94 MMHG | TEMPERATURE: 98 F

## 2024-02-26 DIAGNOSIS — Z12.31 VISIT FOR SCREENING MAMMOGRAM: ICD-10-CM

## 2024-02-26 DIAGNOSIS — G43.E09 CHRONIC MIGRAINE WITH AURA WITHOUT STATUS MIGRAINOSUS, NOT INTRACTABLE: Primary | ICD-10-CM

## 2024-02-26 DIAGNOSIS — E06.3 HASHIMOTO'S THYROIDITIS: ICD-10-CM

## 2024-02-26 DIAGNOSIS — N92.1 MENORRHAGIA WITH IRREGULAR CYCLE: ICD-10-CM

## 2024-02-26 LAB
FSH SERPL IRP2-ACNC: 3.9 MIU/ML
LH SERPL-ACNC: 5.9 MIU/ML
PROLACTIN SERPL 3RD IS-MCNC: 10 NG/ML (ref 5–23)
TSH SERPL DL<=0.005 MIU/L-ACNC: 1.5 UIU/ML (ref 0.3–4.2)

## 2024-02-26 PROCEDURE — 83002 ASSAY OF GONADOTROPIN (LH): CPT | Performed by: FAMILY MEDICINE

## 2024-02-26 PROCEDURE — 84443 ASSAY THYROID STIM HORMONE: CPT | Performed by: FAMILY MEDICINE

## 2024-02-26 PROCEDURE — 96372 THER/PROPH/DIAG INJ SC/IM: CPT | Performed by: FAMILY MEDICINE

## 2024-02-26 PROCEDURE — 83001 ASSAY OF GONADOTROPIN (FSH): CPT | Performed by: FAMILY MEDICINE

## 2024-02-26 PROCEDURE — 84146 ASSAY OF PROLACTIN: CPT | Performed by: FAMILY MEDICINE

## 2024-02-26 PROCEDURE — 90715 TDAP VACCINE 7 YRS/> IM: CPT | Performed by: FAMILY MEDICINE

## 2024-02-26 PROCEDURE — 99214 OFFICE O/P EST MOD 30 MIN: CPT | Mod: 25 | Performed by: FAMILY MEDICINE

## 2024-02-26 PROCEDURE — 90471 IMMUNIZATION ADMIN: CPT | Performed by: FAMILY MEDICINE

## 2024-02-26 PROCEDURE — 36415 COLL VENOUS BLD VENIPUNCTURE: CPT | Performed by: FAMILY MEDICINE

## 2024-02-26 RX ORDER — KETOROLAC TROMETHAMINE 30 MG/ML
60 INJECTION, SOLUTION INTRAMUSCULAR; INTRAVENOUS ONCE
Status: COMPLETED | OUTPATIENT
Start: 2024-02-26 | End: 2024-02-26

## 2024-02-26 RX ADMIN — KETOROLAC TROMETHAMINE 60 MG: 30 INJECTION, SOLUTION INTRAMUSCULAR; INTRAVENOUS at 15:19

## 2024-02-26 ASSESSMENT — ENCOUNTER SYMPTOMS: HEADACHES: 1

## 2024-02-26 ASSESSMENT — PAIN SCALES - GENERAL: PAINLEVEL: MODERATE PAIN (4)

## 2024-02-26 NOTE — PROGRESS NOTES
"  Assessment & Plan     (G43.E09) Chronic migraine with aura without status migrainosus, not intractable  (primary encounter diagnosis)  Comment: menstrual component  Plan: norgestrel-ethinyl estradiol (LO/OVRAL) 0.3-30         MG-MCG tablet, ketorolac (TORADOL) injection 60        mg        Start continuous ocp, if no change or worsening of ha will trial aygestin (progestin only), consider nexplanon if works better  Consider amlodipine for ha prevention, only one she has not tried.  Toradol IM today for acute ha  Continue follow-up with neurology    (N92.1) Menorrhagia with irregular cycle  Comment: very irregular over past 2 months  Plan: TSH with free T4 reflex, Follicle stimulating         hormone, Luteinizing Hormone, Prolactin,         norgestrel-ethinyl estradiol (LO/OVRAL) 0.3-30         MG-MCG tablet        Check labs, treat as indicated    (E06.3) Hashimoto's thyroiditis  Comment: + TPO in past  Plan: minimal replacement currently    (Z12.31) Visit for screening mammogram  Comment: due  Plan: MA Screen Bilateral w/Nick                    BMI  Estimated body mass index is 36.09 kg/m  as calculated from the following:    Height as of this encounter: 1.664 m (5' 5.5\").    Weight as of this encounter: 99.9 kg (220 lb 3.2 oz).   Weight management plan: Discussed healthy diet and exercise guidelines      See Patient Instructions    Leora Cheema is a 43 year old, presenting for the following health issues:  Headache      2/26/2024     1:45 PM   Additional Questions   Roomed by Ike     Headache     History of Present Illness       Headaches:   Since the patient's last clinic visit, headaches are: worsened  The patient is getting headaches:  Daily  She is not able to do normal daily activities when she has a migraine.  The patient is taking the following rescue/relief medications:  Tylenol and other   Patient states \"I get only a small amount of relief\" from the rescue/relief medications.   The patient is " "taking the following medications to prevent migraines:  Other  In the past 4 weeks, the patient has gone to an Urgent Care or Emergency Room 0 times times due to headaches.    She eats 0-1 servings of fruits and vegetables daily.She consumes 2 sweetened beverage(s) daily.She exercises with enough effort to increase her heart rate 9 or less minutes per day.  She exercises with enough effort to increase her heart rate 3 or less days per week.   She is taking medications regularly.     Near daily ha with typical facial pain, sharp stabbing, sometimes with aura sometimes not.  Met with neurology on 1/31/2024, suspects menstrual component to her ha. Recommends continuous ocp to see if that can help.  She has tried multiple other meds  Has ha currently, + nausea, + photophobia    Lately has been having periods almost every 2 weeks.  Unsure why.                    Review of Systems  Constitutional, HEENT, cardiovascular, pulmonary, gi and gu systems are negative, except as otherwise noted.      Objective    BP (!) 141/94   Pulse 99   Temp 98  F (36.7  C) (Oral)   Resp 18   Ht 1.664 m (5' 5.5\")   Wt 99.9 kg (220 lb 3.2 oz)   LMP 02/07/2024 (Exact Date)   SpO2 96%   BMI 36.09 kg/m    Body mass index is 36.09 kg/m .  Physical Exam   GENERAL: alert and moderate distress from ha and sitting in the dark  MS: no gross musculoskeletal defects noted, no edema  SKIN: no suspicious lesions or rashes  PSYCH: mentation appears normal, affect flat, and fatigued            Signed Electronically by: Radha Lay MD    "

## 2024-02-26 NOTE — NURSING NOTE
Clinic Administered Medication Documentation        Patient was given ketorolac tromethamine 60 mg. Prior to medication administration, verified patient's identity using patient s name and date of birth. Please see MAR and medication order for additional information. Patient instructed to remain in clinic for 15 minutes and report any adverse reaction to staff immediately.    Vial/Syringe: Single dose vial. Was entire vial of medication used? Yes    Dieudonne CALLAWAY MA

## 2024-02-26 NOTE — LETTER
My Asthma Action Plan    Name: Deni Simental   YOB: 1980  Date: 2/26/2024   My doctor: Radha Lay MD   My clinic: [facility]        My Rescue Medicine:   Albuterol inhaler (Proair/Ventolin/Proventil HFA)  2-4 puffs EVERY 4 HOURS as needed. Use a spacer if recommended by your provider.   My Asthma Severity:   Intermittent / Exercise Induced  Know your asthma triggers: upper respiratory infections             GREEN ZONE   Good Control  I feel good  No cough or wheeze  Can work, sleep and play without asthma symptoms       Take your asthma control medicine every day.     If exercise triggers your asthma, take your rescue medication  15 minutes before exercise or sports, and  During exercise if you have asthma symptoms  Spacer to use with inhaler: If you have a spacer, make sure to use it with your inhaler             YELLOW ZONE Getting Worse  I have ANY of these:  I do not feel good  Cough or wheeze  Chest feels tight  Wake up at night   Keep taking your Green Zone medications  Start taking your rescue medicine:  every 20 minutes for up to 1 hour. Then every 4 hours for 24-48 hours.  If you stay in the Yellow Zone for more than 12-24 hours, contact your doctor.  If you do not return to the Green Zone in 12-24 hours or you get worse, start taking your oral steroid medicine if prescribed by your provider.           RED ZONE Medical Alert - Get Help  I have ANY of these:  I feel awful  Medicine is not helping  Breathing getting harder  Trouble walking or talking  Nose opens wide to breathe       Take your rescue medicine NOW  If your provider has prescribed an oral steroid medicine, start taking it NOW  Call your doctor NOW  If you are still in the Red Zone after 20 minutes and you have not reached your doctor:  Take your rescue medicine again and  Call 911 or go to the emergency room right away    See your regular doctor within 2 weeks of an Emergency Room or Urgent  Care visit for follow-up treatment.          Annual Reminders:  Meet with Asthma Educator,  Flu Shot in the Fall, consider Pneumonia Vaccination for patients with asthma (aged 19 and older).    Pharmacy:    [id number] - [place], [place] - [address] [facility] [place] [address] [facility] - [place], [place] - [address] [facility] [address]  [facility] [address] [facility] [place] [place] [address] [address] [place] [address]    Electronically signed by Radha Lay MD   Date: 02/26/24                    Asthma Triggers  How To Control Things That Make Your Asthma Worse    Triggers are things that make your asthma worse.  Look at the list below to help you find your triggers and   what you can do about them. You can help prevent asthma flare-ups by staying away from your triggers.      Trigger                                                          What you can do   Cigarette Smoke  Tobacco smoke can make asthma worse. Do not allow smoking in your home, car or around you.  Be sure no one smokes at a child s day care or school.  If you smoke, ask your health care provider for ways to help you quit.  Ask family members to quit too.  Ask your health care provider for a referral to Quit Plan to help you quit smoking, or call 5-255-965-PLAN.     Colds, Flu, Bronchitis  These are common triggers of asthma. Wash your hands often.  Don t touch your eyes, nose or mouth.  Get a flu shot every year.     Dust Mites  These are tiny bugs that live in cloth or carpet. They are too small to see. Wash sheets and blankets in hot water every week.   Encase pillows and mattress in dust mite proof covers.  Avoid having carpet if you can. If you have carpet, vacuum weekly.   Use a dust mask and HEPA vacuum.   Pollen and Outdoor Mold  Some people are allergic to trees, grass, or weed pollen, or molds. Try to keep your windows closed.  Limit time out doors when pollen count is high.   Ask you health care provider about taking medicine during allergy season.     Animal Dander  Some  people are allergic to skin flakes, urine or saliva from pets with fur or feathers. Keep pets with fur or feathers out of your home.    If you can t keep the pet outdoors, then keep the pet out of your bedroom.  Keep the bedroom door closed.  Keep pets off cloth furniture and away from stuffed toys.     Mice, Rats, and Cockroaches  Some people are allergic to the waste from these pests.   Cover food and garbage.  Clean up spills and food crumbs.  Store grease in the refrigerator.   Keep food out of the bedroom.   Indoor Mold  This can be a trigger if your home has high moisture. Fix leaking faucets, pipes, or other sources of water.   Clean moldy surfaces.  Dehumidify basement if it is damp and smelly.   Smoke, Strong Odors, and Sprays  These can reduce air quality. Stay away from strong odors and sprays, such as perfume, powder, hair spray, paints, smoke incense, paint, cleaning products, candles and new carpet.   Exercise or Sports  Some people with asthma have this trigger. Be active!  Ask your doctor about taking medicine before sports or exercise to prevent symptoms.    Warm up for 5-10 minutes before and after sports or exercise.     Other Triggers of Asthma  Cold air:  Cover your nose and mouth with a scarf.  Sometimes laughing or crying can be a trigger.  Some medicines and food can trigger asthma.

## 2024-03-25 ENCOUNTER — MYC MEDICAL ADVICE (OUTPATIENT)
Dept: FAMILY MEDICINE | Facility: CLINIC | Age: 44
End: 2024-03-25

## 2024-04-29 NOTE — PROGRESS NOTES
[facility]  Section of General Neurology  Return Patient  Virtual Visit    Deni Simental MRN# [id number]   Age: 43 year old YOB: 1980            Assessment and Plan:   Assessment:  Deni Simental is a very pleasant 43 year old female seen in follow up for migraine headaches.   They remain worse than previously but are improving after starting birth control, higher dose of topiramate may be kicking in, she notes.  Discussed options, will keep current meds the same, change Nurtec to preventative and add Ubrelvy as noted.  All questions answered.        Plan:  Continue topamax 100 mg twice a day  Continue nortriptyline 50 mg at bedtime  Change Nurtec to every other day  Add Ubrelvy 100 mg as needed  Side effects re discussed, teratogenicity noted, overall I think will continue to be well tolerated  Follow up in 3-4 months         Charles Vega MD   of Neurology   [facility]      Interval history:     Topamax did help eventually  100 mg BID now.    Nortriptyline 50 mg at bedtime --same dose  Frovatriptan didn't help.  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   Initially more frequent periods.    Discussed BC risk/migraines, likely OK with newer BC  Still using nurtec quite a bit.  Still helps usually but not helping with this one.    Most helpful of all of the rescue medications overall  ~16-17 headache days per month.    Had failed imitrex and maxalt previously.    Back up plan --going higher on topamax or nortriptyline, discussed    A/P at previous visit  Deni Simental is a very pleasant 43 year old female seen in follow up for migraine headaches.  They have worsened overall since our last visit but specifically catamenially are much worse.  She may think about hormone therapy in this regard.  Discussed risk of clotting/stroke correlates with estrogen content, as a non smoker without much  migraine aura she is lower risk overall but data on this is mixed/not an absolute contraindications.  For migraines will make changes as below, I'm optimistic we can get her better controlled again.  Side effects of topiramate re discussed.      Plan:  Increase topamax to 50 mg qam 100 mg at bedtime x2 weeks then go to 100 mg twice a day  Keep nortriptyline at same dose (50 mg)  Continue nurtec PRN  Add frovatriptan starting day before period twice a day x 4-6 days as needed/tolerated     Future ideas: Nurtec preventatively, Ubrelvy as needed.  Medrol dose pack to break cycle      Follow up in ~3 months to reach out sooner if not better      Past Medical History:     Patient Active Problem List   Diagnosis    CARDIOVASCULAR SCREENING; LDL GOAL LESS THAN 160    Migraine with aura and without status migrainosus, not intractable    Hashimoto's thyroiditis    Thyroglobulin antibody positive    Alopecia    Fatigue, unspecified type    Poor sleep hygiene    Thyroid nodule    Adult ADHD     Past Medical History:   Diagnosis Date    Allergy     multiple    Ankle sprain 4/10    Arthritis     Depression     in teens -     Hashimoto's thyroiditis     IUD (intrauterine device) in place     mirena 05    Uncomplicated asthma         Past Surgical History:     Past Surgical History:   Procedure Laterality Date    HC TOOTH EXTRACTION W/FORCEP          Social History:     Social History     Tobacco Use    Smoking status: Former     Current packs/day: 0.00     Types: Cigarettes     Quit date: 2011     Years since quittin.6    Smokeless tobacco: Never    Tobacco comments:     Lives in smoke free household   Vaping Use    Vaping status: Never Used   Substance Use Topics    Alcohol use: No    Drug use: No        Family History:     Family History   Problem Relation Age of Onset    Alcohol/Drug Mother          MVA alcohol related    Diabetes Father         hypoglycemia    Hypertension Father     Psoriatic Arthritis  Father     Psoriasis Father     Graves' disease Sister         Graves, tumor    Cerebrovascular Disease Maternal Grandmother     Hypothyroidism Paternal Grandmother     Cancer Paternal Grandfather         lung cancer    C.A.D. No family hx of     Depression No family hx of     Gastrointestinal Disease No family hx of     Heart Disease No family hx of     Lipids No family hx of     Neurologic Disorder No family hx of     Respiratory No family hx of     Glaucoma No family hx of     Macular Degeneration No family hx of         Medications:     Current Outpatient Medications   Medication Sig Dispense Refill    albuterol (PROAIR HFA/PROVENTIL HFA/VENTOLIN HFA) 108 (90 Base) MCG/ACT inhaler Inhale 1-2 puffs into the lungs every 4 hours as needed for shortness of breath, wheezing or cough 18 g 3    albuterol (PROVENTIL) (2.5 MG/3ML) 0.083% neb solution Inhale 2.5 mg into the lungs as needed      amphetamine-dextroamphetamine (ADDERALL XR) 20 MG 24 hr capsule Take 1 capsule (20 mg) by mouth daily 30 capsule 0    frovatriptan (FROVA) 2.5 MG tablet take day before cycle take twice a day x5-6 days 12 tablet 5    levothyroxine (SYNTHROID/LEVOTHROID) 25 MCG tablet Take 1 tablet (25 mcg) by mouth daily 90 tablet 1    nabumetone (RELAFEN) 750 MG tablet TAKE 1 TABLET BY MOUTH TWICE A  tablet 1    norgestrel-ethinyl estradiol (LO/OVRAL) 0.3-30 MG-MCG tablet Skip week 4 of packs 1-3, then take week 4 of pack 4 112 tablet 4    nortriptyline (PAMELOR) 50 MG capsule TAKE 1 CAPSULE BY MOUTH AT BEDTIME 90 capsule 2    rimegepant (NURTEC) 75 MG ODT tablet Take 1 tablet (75 mg) by mouth daily as needed (headache) 9 tablet 8    topiramate (TOPAMAX) 100 MG tablet Go to 50 mg qam 100 mg at night x2 weeks then go to 100 mg  tablet 1     No current facility-administered medications for this visit.        Allergies:     Allergies   Allergen Reactions    Wasp Venom Hives     Swelling past two joints    [place] Flavor Hives    Red Dye  Hives    Retin-A [Tretinoin] Hives        Review of Systems:   As noted above     Physical Exam:   General: Seated comfortably in no acute distress.  Neurologic:     Mental Status: Fully alert, attentive and oriented. Speech clear and fluent, no paraphasic errors.     Cranial Nerves: EOM appear intact. Facial movements symmetric. Hearing not formally tested but intact to conversation.  No dysarthria.     Motor: No tremors or other abnormal movements observed.      Sensory:Not able to be tested virtually                 The longitudinal plan of care for migraine headaches was addressed during this visit. Due to the added complexity in care, I will continue to support Ms Simental in the subsequent management of this condition(s) and with the ongoing continuity of care of this condition(s).

## 2024-05-02 ENCOUNTER — VIRTUAL VISIT (OUTPATIENT)
Dept: NEUROLOGY | Facility: CLINIC | Age: 44
End: 2024-05-02
Payer: COMMERCIAL

## 2024-05-02 DIAGNOSIS — G43.019 INTRACTABLE MIGRAINE WITHOUT AURA AND WITHOUT STATUS MIGRAINOSUS: Primary | ICD-10-CM

## 2024-05-02 PROCEDURE — G2211 COMPLEX E/M VISIT ADD ON: HCPCS | Performed by: STUDENT IN AN ORGANIZED HEALTH CARE EDUCATION/TRAINING PROGRAM

## 2024-05-02 PROCEDURE — 99214 OFFICE O/P EST MOD 30 MIN: CPT | Mod: 95 | Performed by: STUDENT IN AN ORGANIZED HEALTH CARE EDUCATION/TRAINING PROGRAM

## 2024-05-02 NOTE — PROGRESS NOTES
Deni is a 43 year old who is being evaluated via a billable video visit.    How would you like to obtain your AVS? MyChart  If the video visit is dropped, the invitation should be resent by: Text to cell phone: [phone]  Will anyone else be joining your video visit? No    Video-Visit Details    Type of service:  Video Visit   Originating Location (pt. Location): Home    Distant Location (provider location):  On-site  Platform used for Video Visit:   ELPIDIO Carreno, GINA ([facility])    Video data 122-134 PM

## 2024-05-02 NOTE — PATIENT INSTRUCTIONS
Continue topamax 100 mg twice a day  Continue nortriptyline 50 mg at bedtime  Change Nurtec to every other day  Add Ubrelvy 100 mg as needed    Follow up in 3-4 months

## 2024-05-02 NOTE — LETTER
5/2/2024         RE: Deni Simental  [address] [facility] [address]        Dear Colleague,    Thank you for referring your patient, Deni Simental, to the [facility] [place]. Please see a copy of my visit note below.    [facility]  Section of General Neurology  Return Patient  Virtual Visit    Deni Simental MRN# [id number]   Age: 43 year old YOB: 1980            Assessment and Plan:   Assessment:  Deni Simental is a very pleasant 43 year old female seen in follow up for migraine headaches.   They remain worse than previously but are improving after starting birth control, higher dose of topiramate may be kicking in, she notes.  Discussed options, will keep current meds the same, change Nurtec to preventative and add Ubrelvy as noted.  All questions answered.        Plan:  Continue topamax 100 mg twice a day  Continue nortriptyline 50 mg at bedtime  Change Nurtec to every other day  Add Ubrelvy 100 mg as needed  Side effects re discussed, teratogenicity noted, overall I think will continue to be well tolerated  Follow up in 3-4 months         Charles Vega MD   of Neurology   [facility]      Interval history:     Topamax did help eventually  100 mg BID now.    Nortriptyline 50 mg at bedtime --same dose  Frovatriptan didn't help.  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   Initially more frequent periods.    Discussed BC risk/migraines, likely OK with newer BC  Still using nurtec quite a bit.  Still helps usually but not helping with this one.    Most helpful of all of the rescue medications overall  ~16-17 headache days per month.    Had failed imitrex and maxalt previously.    Back up plan --going higher on topamax or nortriptyline, discussed    A/P at previous visit  Deni Simental is a very pleasant 43 year old female seen in follow  up for migraine headaches.  They have worsened overall since our last visit but specifically catamenially are much worse.  She may think about hormone therapy in this regard.  Discussed risk of clotting/stroke correlates with estrogen content, as a non smoker without much migraine aura she is lower risk overall but data on this is mixed/not an absolute contraindications.  For migraines will make changes as below, I'm optimistic we can get her better controlled again.  Side effects of topiramate re discussed.      Plan:  Increase topamax to 50 mg qam 100 mg at bedtime x2 weeks then go to 100 mg twice a day  Keep nortriptyline at same dose (50 mg)  Continue nurtec PRN  Add frovatriptan starting day before period twice a day x 4-6 days as needed/tolerated     Future ideas: Nurtec preventatively, Ubrelvy as needed.  Medrol dose pack to break cycle      Follow up in ~3 months to reach out sooner if not better      Past Medical History:     Patient Active Problem List   Diagnosis     CARDIOVASCULAR SCREENING; LDL GOAL LESS THAN 160     Migraine with aura and without status migrainosus, not intractable     Hashimoto's thyroiditis     Thyroglobulin antibody positive     Alopecia     Fatigue, unspecified type     Poor sleep hygiene     Thyroid nodule     Adult ADHD     Past Medical History:   Diagnosis Date     Allergy     multiple     Ankle sprain 4/10     Arthritis      Depression     in teens -      Hashimoto's thyroiditis      IUD (intrauterine device) in place     mirena 05     Uncomplicated asthma         Past Surgical History:     Past Surgical History:   Procedure Laterality Date     HC TOOTH EXTRACTION W/FORCEP          Social History:     Social History     Tobacco Use     Smoking status: Former     Current packs/day: 0.00     Types: Cigarettes     Quit date: 2011     Years since quittin.6     Smokeless tobacco: Never     Tobacco comments:     Lives in smoke free household   Vaping Use     Vaping  status: Never Used   Substance Use Topics     Alcohol use: No     Drug use: No        Family History:     Family History   Problem Relation Age of Onset     Alcohol/Drug Mother          MVA alcohol related     Diabetes Father         hypoglycemia     Hypertension Father      Psoriatic Arthritis Father      Psoriasis Father      Graves' disease Sister         Graves, tumor     Cerebrovascular Disease Maternal Grandmother      Hypothyroidism Paternal Grandmother      Cancer Paternal Grandfather         lung cancer     C.A.D. No family hx of      Depression No family hx of      Gastrointestinal Disease No family hx of      Heart Disease No family hx of      Lipids No family hx of      Neurologic Disorder No family hx of      Respiratory No family hx of      Glaucoma No family hx of      Macular Degeneration No family hx of         Medications:     Current Outpatient Medications   Medication Sig Dispense Refill     albuterol (PROAIR HFA/PROVENTIL HFA/VENTOLIN HFA) 108 (90 Base) MCG/ACT inhaler Inhale 1-2 puffs into the lungs every 4 hours as needed for shortness of breath, wheezing or cough 18 g 3     albuterol (PROVENTIL) (2.5 MG/3ML) 0.083% neb solution Inhale 2.5 mg into the lungs as needed       amphetamine-dextroamphetamine (ADDERALL XR) 20 MG 24 hr capsule Take 1 capsule (20 mg) by mouth daily 30 capsule 0     frovatriptan (FROVA) 2.5 MG tablet take day before cycle take twice a day x5-6 days 12 tablet 5     levothyroxine (SYNTHROID/LEVOTHROID) 25 MCG tablet Take 1 tablet (25 mcg) by mouth daily 90 tablet 1     nabumetone (RELAFEN) 750 MG tablet TAKE 1 TABLET BY MOUTH TWICE A  tablet 1     norgestrel-ethinyl estradiol (LO/OVRAL) 0.3-30 MG-MCG tablet Skip week 4 of packs 1-3, then take week 4 of pack 4 112 tablet 4     nortriptyline (PAMELOR) 50 MG capsule TAKE 1 CAPSULE BY MOUTH AT BEDTIME 90 capsule 2     rimegepant (NURTEC) 75 MG ODT tablet Take 1 tablet (75 mg) by mouth daily as needed (headache) 9  tablet 8     topiramate (TOPAMAX) 100 MG tablet Go to 50 mg qam 100 mg at night x2 weeks then go to 100 mg  tablet 1     No current facility-administered medications for this visit.        Allergies:     Allergies   Allergen Reactions     Wasp Venom Hives     Swelling past two joints     [place] Flavor Hives     Red Dye Hives     Retin-A [Tretinoin] Hives        Review of Systems:   As noted above     Physical Exam:   General: Seated comfortably in no acute distress.  Neurologic:     Mental Status: Fully alert, attentive and oriented. Speech clear and fluent, no paraphasic errors.     Cranial Nerves: EOM appear intact. Facial movements symmetric. Hearing not formally tested but intact to conversation.  No dysarthria.     Motor: No tremors or other abnormal movements observed.      Sensory:Not able to be tested virtually                 The longitudinal plan of care for migraine headaches was addressed during this visit. Due to the added complexity in care, I will continue to support Ms Simental in the subsequent management of this condition(s) and with the ongoing continuity of care of this condition(s).      Deni is a 43 year old who is being evaluated via a billable video visit.    How would you like to obtain your AVS? MyChart  If the video visit is dropped, the invitation should be resent by: Text to cell phone: [phone]  Will anyone else be joining your video visit? No    Video-Visit Details    Type of service:  Video Visit   Originating Location (pt. Location): Home    Distant Location (provider location):  On-site  Platform used for Video Visit:   ELPIDIO Carreno, GINA ([facility])    Video data 122-134 PM      Again, thank you for allowing me to participate in the care of your patient.        Sincerely,        Lev Vega MD

## 2024-05-14 DIAGNOSIS — E06.3 HASHIMOTO'S THYROIDITIS: ICD-10-CM

## 2024-05-14 RX ORDER — LEVOTHYROXINE SODIUM 25 UG/1
25 TABLET ORAL DAILY
Qty: 90 TABLET | Refills: 2 | Status: SHIPPED | OUTPATIENT
Start: 2024-05-14 | End: 2024-08-28

## 2024-06-12 ENCOUNTER — TELEPHONE (OUTPATIENT)
Dept: NEUROLOGY | Facility: CLINIC | Age: 44
End: 2024-06-12
Payer: COMMERCIAL

## 2024-06-12 DIAGNOSIS — G43.019 INTRACTABLE MIGRAINE WITHOUT AURA AND WITHOUT STATUS MIGRAINOSUS: Primary | ICD-10-CM

## 2024-06-12 RX ORDER — METHYLPREDNISOLONE 4 MG
TABLET, DOSE PACK ORAL
Qty: 21 TABLET | Refills: 0 | Status: SHIPPED | OUTPATIENT
Start: 2024-06-12 | End: 2024-08-28

## 2024-06-12 NOTE — TELEPHONE ENCOUNTER
Prior Authorization Retail Medication Request    Medication/Dose: galcanezumab-gnlm (EMGALITY) 120 MG/ML injection   Diagnosis and ICD code (if different than what is on RX):  Intractable migraine without aura and without status migrainosus [G43.019]  - Primary   New/renewal/insurance change PA/secondary ins. PA:  Previously Tried and Failed:  see chart  Rationale:  see chart    Insurance   Primary: \[facility]\"" [employer]   Insurance ID:  [id number]     Secondary (if applicable):[employer]   Insurance ID:  [id number]     Pharmacy Information (if different than what is on RX)  [facility] [place]  Phone:  [phone]   Fax:[phone]

## 2024-06-20 NOTE — TELEPHONE ENCOUNTER
Prior Authorization Not Needed per Insurance    Medication: EMGALITY 120 MG/ML SC SOAJ  Insurance Company: [facility] [employer]-Specialty PA's - Phone [phone] Fax [phone]  Expected CoPay: $    [facility] [id number] [place] [facility] [place] [place] [facility] [address] [address] [facility]  Pharmacy Notified: YES  Patient Notified: **Instructed pharmacy to notify patient when script is ready to /ship.**    Already approved via epic epa

## 2024-07-25 ENCOUNTER — ANCILLARY PROCEDURE (OUTPATIENT)
Dept: MAMMOGRAPHY | Facility: CLINIC | Age: 44
End: 2024-07-25
Attending: FAMILY MEDICINE
Payer: COMMERCIAL

## 2024-07-25 DIAGNOSIS — Z12.31 VISIT FOR SCREENING MAMMOGRAM: ICD-10-CM

## 2024-07-25 PROCEDURE — 77063 BREAST TOMOSYNTHESIS BI: CPT | Mod: TC | Performed by: RADIOLOGY

## 2024-07-25 PROCEDURE — 77067 SCR MAMMO BI INCL CAD: CPT | Mod: TC | Performed by: RADIOLOGY

## 2024-07-27 DIAGNOSIS — Z79.1 NSAID LONG-TERM USE: ICD-10-CM

## 2024-07-27 DIAGNOSIS — M79.642 PAIN IN BOTH HANDS: ICD-10-CM

## 2024-07-27 DIAGNOSIS — E06.3 HASHIMOTO'S THYROIDITIS: Primary | ICD-10-CM

## 2024-07-27 DIAGNOSIS — M79.641 PAIN IN BOTH HANDS: ICD-10-CM

## 2024-07-28 RX ORDER — RIMEGEPANT SULFATE 75 MG/75MG
TABLET, ORALLY DISINTEGRATING ORAL
COMMUNITY
Start: 2024-07-01

## 2024-07-30 DIAGNOSIS — G43.019 INTRACTABLE MIGRAINE WITHOUT AURA AND WITHOUT STATUS MIGRAINOSUS: ICD-10-CM

## 2024-07-30 RX ORDER — TOPIRAMATE 100 MG/1
TABLET, FILM COATED ORAL
Qty: 180 TABLET | Refills: 1 | Status: SHIPPED | OUTPATIENT
Start: 2024-07-30

## 2024-07-30 NOTE — TELEPHONE ENCOUNTER
Pending Prescriptions:                       Disp   Refills    topiramate (TOPAMAX) 100 MG tablet        180 ta*1            Sig: Go to 50 mg qam 100 mg at night x2 weeks then go           to 100 mg BID       Requested Pharmacy: [facility] [id number] [place] [facility] [place] - [address] [address] [place] [address]      Pt's last office visit: 05/02/2024  Next scheduled office visit: 08/28/2024      Per the RN/LPN medication refill protocol, writer is unable to refill this request.

## 2024-08-02 ENCOUNTER — ANCILLARY PROCEDURE (OUTPATIENT)
Dept: MAMMOGRAPHY | Facility: CLINIC | Age: 44
End: 2024-08-02
Attending: FAMILY MEDICINE
Payer: COMMERCIAL

## 2024-08-02 DIAGNOSIS — R92.8 ABNORMAL MAMMOGRAM: ICD-10-CM

## 2024-08-02 PROCEDURE — 77065 DX MAMMO INCL CAD UNI: CPT | Mod: LT | Performed by: STUDENT IN AN ORGANIZED HEALTH CARE EDUCATION/TRAINING PROGRAM

## 2024-08-02 PROCEDURE — G0279 TOMOSYNTHESIS, MAMMO: HCPCS | Performed by: STUDENT IN AN ORGANIZED HEALTH CARE EDUCATION/TRAINING PROGRAM

## 2024-08-24 ENCOUNTER — LAB (OUTPATIENT)
Dept: LAB | Facility: CLINIC | Age: 44
End: 2024-08-24
Payer: COMMERCIAL

## 2024-08-24 DIAGNOSIS — E06.3 HASHIMOTO'S THYROIDITIS: ICD-10-CM

## 2024-08-24 DIAGNOSIS — Z79.1 NSAID LONG-TERM USE: ICD-10-CM

## 2024-08-24 LAB
ANION GAP SERPL CALCULATED.3IONS-SCNC: 13 MMOL/L (ref 7–15)
BUN SERPL-MCNC: 8.2 MG/DL (ref 6–20)
CALCIUM SERPL-MCNC: 9.2 MG/DL (ref 8.8–10.4)
CHLORIDE SERPL-SCNC: 106 MMOL/L (ref 98–107)
CREAT SERPL-MCNC: 0.83 MG/DL (ref 0.51–0.95)
EGFRCR SERPLBLD CKD-EPI 2021: 90 ML/MIN/1.73M2
GLUCOSE SERPL-MCNC: 90 MG/DL (ref 70–99)
HCO3 SERPL-SCNC: 20 MMOL/L (ref 22–29)
POTASSIUM SERPL-SCNC: 3.4 MMOL/L (ref 3.4–5.3)
SODIUM SERPL-SCNC: 139 MMOL/L (ref 135–145)
TSH SERPL DL<=0.005 MIU/L-ACNC: 1.7 UIU/ML (ref 0.3–4.2)

## 2024-08-24 PROCEDURE — 80048 BASIC METABOLIC PNL TOTAL CA: CPT

## 2024-08-24 PROCEDURE — 84443 ASSAY THYROID STIM HORMONE: CPT

## 2024-08-24 PROCEDURE — 36415 COLL VENOUS BLD VENIPUNCTURE: CPT

## 2024-08-25 SDOH — HEALTH STABILITY: PHYSICAL HEALTH: ON AVERAGE, HOW MANY DAYS PER WEEK DO YOU ENGAGE IN MODERATE TO STRENUOUS EXERCISE (LIKE A BRISK WALK)?: 2 DAYS

## 2024-08-25 SDOH — HEALTH STABILITY: PHYSICAL HEALTH: ON AVERAGE, HOW MANY MINUTES DO YOU ENGAGE IN EXERCISE AT THIS LEVEL?: 20 MIN

## 2024-08-25 ASSESSMENT — SOCIAL DETERMINANTS OF HEALTH (SDOH): HOW OFTEN DO YOU GET TOGETHER WITH FRIENDS OR RELATIVES?: PATIENT DECLINED

## 2024-08-28 ENCOUNTER — VIRTUAL VISIT (OUTPATIENT)
Dept: NEUROLOGY | Facility: CLINIC | Age: 44
End: 2024-08-28
Payer: COMMERCIAL

## 2024-08-28 ENCOUNTER — OFFICE VISIT (OUTPATIENT)
Dept: FAMILY MEDICINE | Facility: CLINIC | Age: 44
End: 2024-08-28
Payer: COMMERCIAL

## 2024-08-28 VITALS
TEMPERATURE: 97.3 F | HEIGHT: 67 IN | BODY MASS INDEX: 34.21 KG/M2 | SYSTOLIC BLOOD PRESSURE: 130 MMHG | OXYGEN SATURATION: 98 % | DIASTOLIC BLOOD PRESSURE: 88 MMHG | HEART RATE: 96 BPM | WEIGHT: 218 LBS | RESPIRATION RATE: 16 BRPM

## 2024-08-28 DIAGNOSIS — G43.019 INTRACTABLE MIGRAINE WITHOUT AURA AND WITHOUT STATUS MIGRAINOSUS: ICD-10-CM

## 2024-08-28 DIAGNOSIS — Z00.00 ROUTINE GENERAL MEDICAL EXAMINATION AT A HEALTH CARE FACILITY: Primary | ICD-10-CM

## 2024-08-28 DIAGNOSIS — J45.20 MILD INTERMITTENT ASTHMA WITHOUT COMPLICATION: ICD-10-CM

## 2024-08-28 DIAGNOSIS — F90.9 ADULT ADHD: ICD-10-CM

## 2024-08-28 DIAGNOSIS — G43.E09 CHRONIC MIGRAINE WITH AURA WITHOUT STATUS MIGRAINOSUS, NOT INTRACTABLE: ICD-10-CM

## 2024-08-28 DIAGNOSIS — N92.1 MENORRHAGIA WITH IRREGULAR CYCLE: ICD-10-CM

## 2024-08-28 PROCEDURE — G2211 COMPLEX E/M VISIT ADD ON: HCPCS | Performed by: STUDENT IN AN ORGANIZED HEALTH CARE EDUCATION/TRAINING PROGRAM

## 2024-08-28 PROCEDURE — 99396 PREV VISIT EST AGE 40-64: CPT | Performed by: FAMILY MEDICINE

## 2024-08-28 PROCEDURE — 99214 OFFICE O/P EST MOD 30 MIN: CPT | Mod: 95 | Performed by: STUDENT IN AN ORGANIZED HEALTH CARE EDUCATION/TRAINING PROGRAM

## 2024-08-28 PROCEDURE — 99214 OFFICE O/P EST MOD 30 MIN: CPT | Mod: 25 | Performed by: FAMILY MEDICINE

## 2024-08-28 RX ORDER — LEVONORGESTREL AND ETHINYL ESTRADIOL 0.15-0.03
1 KIT ORAL DAILY
Qty: 112 TABLET | Refills: 1 | Status: SHIPPED | OUTPATIENT
Start: 2024-08-28

## 2024-08-28 RX ORDER — GUANFACINE 2 MG/1
2 TABLET, EXTENDED RELEASE ORAL AT BEDTIME
Qty: 30 TABLET | Refills: 0 | Status: SHIPPED | OUTPATIENT
Start: 2024-08-28

## 2024-08-28 RX ORDER — NORTRIPTYLINE HYDROCHLORIDE 75 MG/1
CAPSULE ORAL
Qty: 90 CAPSULE | Refills: 1 | Status: SHIPPED | OUTPATIENT
Start: 2024-08-28

## 2024-08-28 RX ORDER — ALBUTEROL SULFATE 90 UG/1
1-2 AEROSOL, METERED RESPIRATORY (INHALATION) EVERY 4 HOURS PRN
Qty: 18 G | Refills: 3 | Status: SHIPPED | OUTPATIENT
Start: 2024-08-28

## 2024-08-28 ASSESSMENT — PAIN SCALES - GENERAL: PAINLEVEL: MODERATE PAIN (5)

## 2024-08-28 NOTE — PROGRESS NOTES
[facility]/[place]  Section of General Neurology  Return Patient  Virtual Visit    Deni Simental MRN# [id number]   Age: 43 year old YOB: 1980            Assessment and Plan:   Assessment:  Deni Simental is a very pleasant 43 year old female seen in follow up for migraine headaches.   They remain worse than previously after being pretty well controlled.   Emgality in fact appears to have made things worse though there was a miscommunication and she was on both this and Nurtec preventatively which may have been a factor.  Discussed options.  I think the time has come to trial botox additionally given they are still >15 days per month and not responding to many treatment modalities.   Discussed risk, benefit, side effects/teratogenicity etc considerations.  Will make changes as below.       Plan:  Increase nortriptyline to 75 mg at bedtime  Continue topamax 100 mg BID  Continue nurtec 75 mg EOD   Stop Emgality  Continue Ubrelvy 100 mg PRN  Headache clinic referral for botox consideration  Follow up with me in 2-3 months virtually         Charles Vega MD   of Neurology   [facility]      Interval history:   Emgality---20-30 minutes of injection got cramps this month, in month's past she wonders if triggered period.   On birth control which usually helps periods and stay on a schedule.    Ubrelvy rescue   Remained on emgality and nurtec both --miscommunication  Overall headaches are the same.    Open to botox.   ~15-17 headache days per month still.   Tolerating nortriptyline, topamax   Medrol dose pack--didn't help.   Discussed toradol as a future option     A/P at last visit  Deni Simental is a very pleasant 43 year old female seen in follow up for migraine headaches.   They remain worse than previously but are improving after starting birth control, higher dose of topiramate may be kicking in, she notes.  Discussed options,  will keep current meds the same, change Nurtec to preventative and add Ubrelvy as noted.  All questions answered.        Plan:  Continue topamax 100 mg twice a day  Continue nortriptyline 50 mg at bedtime  Change Nurtec to every other day  Add Ubrelvy 100 mg as needed  Side effects re discussed, teratogenicity noted, overall I think will continue to be well tolerated  Follow up in 3-4 months          Past Medical History:     Patient Active Problem List   Diagnosis    CARDIOVASCULAR SCREENING; LDL GOAL LESS THAN 160    Migraine with aura and without status migrainosus, not intractable    Hashimoto's thyroiditis    Thyroglobulin antibody positive    Alopecia    Fatigue, unspecified type    Poor sleep hygiene    Thyroid nodule    Adult ADHD     Past Medical History:   Diagnosis Date    Allergy     multiple    Ankle sprain 4/10    Arthritis     Depression     in teens -     Hashimoto's thyroiditis     IUD (intrauterine device) in place     mirena 05    Uncomplicated asthma         Past Surgical History:     Past Surgical History:   Procedure Laterality Date    HC TOOTH EXTRACTION W/FORCEP          Social History:     Social History     Tobacco Use    Smoking status: Former     Current packs/day: 0.00     Types: Cigarettes     Quit date: 2011     Years since quittin.9    Smokeless tobacco: Never    Tobacco comments:     Lives in smoke free household   Vaping Use    Vaping status: Never Used   Substance Use Topics    Alcohol use: No    Drug use: No        Family History:     Family History   Problem Relation Age of Onset    Alcohol/Drug Mother          MVA alcohol related    Diabetes Father         hypoglycemia    Hypertension Father     Psoriatic Arthritis Father     Psoriasis Father     Graves' disease Sister         Graves, tumor    Cerebrovascular Disease Maternal Grandmother     Hypothyroidism Paternal Grandmother     Cancer Paternal Grandfather         lung cancer    C.A.D. No family hx of      Depression No family hx of     Gastrointestinal Disease No family hx of     Heart Disease No family hx of     Lipids No family hx of     Neurologic Disorder No family hx of     Respiratory No family hx of     Glaucoma No family hx of     Macular Degeneration No family hx of         Medications:     Current Outpatient Medications   Medication Sig Dispense Refill    albuterol (PROAIR HFA/PROVENTIL HFA/VENTOLIN HFA) 108 (90 Base) MCG/ACT inhaler Inhale 1-2 puffs into the lungs every 4 hours as needed for shortness of breath, wheezing or cough 18 g 3    albuterol (PROVENTIL) (2.5 MG/3ML) 0.083% neb solution Inhale 2.5 mg into the lungs as needed      amphetamine-dextroamphetamine (ADDERALL XR) 20 MG 24 hr capsule Take 1 capsule (20 mg) by mouth daily 30 capsule 0    frovatriptan (FROVA) 2.5 MG tablet take day before cycle take twice a day x5-6 days 12 tablet 5    galcanezumab-gnlm (EMGALITY) 120 MG/ML injection Inject 2 mLs (240 mg) Subcutaneous every 28 days 2 mL 0    galcanezumab-gnlm (EMGALITY) 120 MG/ML injection Inject 1 mL (120 mg) Subcutaneous every 28 days 1 mL 11    levothyroxine (SYNTHROID/LEVOTHROID) 25 MCG tablet TAKE 1 TABLET BY MOUTH EVERY DAY 90 tablet 2    methylPREDNISolone (MEDROL DOSEPAK) 4 MG tablet therapy pack Follow Package Directions 21 tablet 0    nabumetone (RELAFEN) 750 MG tablet TAKE 1 TABLET BY MOUTH TWICE A  tablet 0    norgestrel-ethinyl estradiol (LO/OVRAL) 0.3-30 MG-MCG tablet Skip week 4 of packs 1-3, then take week 4 of pack 4 112 tablet 4    nortriptyline (PAMELOR) 50 MG capsule TAKE 1 CAPSULE BY MOUTH AT BEDTIME 90 capsule 2    NURTEC 75 MG ODT tablet PLACE 1 TABLET (75 MG) UNDER THE TONGUE EVERY 48 HOURS      topiramate (TOPAMAX) 100 MG tablet Go to 50 mg qam 100 mg at night x2 weeks then go to 100 mg  tablet 1    ubrogepant (UBRELVY) 100 MG tablet Take 1 tablet (100 mg) by mouth at onset of headache 8 tablet 5     No current facility-administered medications for this  visit.        Allergies:     Allergies   Allergen Reactions    Wasp Venom Hives     Swelling past two joints    Higinio Flavor Hives    Red Dye #40 (Allura Red) Hives    Retin-A [Tretinoin] Hives        Review of Systems:   As noted above     Physical Exam:   General: Seated comfortably in no acute distress.  Neurologic:     Mental Status: Fully alert, attentive and oriented. Speech clear and fluent, no paraphasic errors.     Cranial Nerves:  Facial movements symmetric. Hearing not formally tested but intact to conversation.  No dysarthria.     Motor: No tremors or other abnormal movements observed.      Sensory:Not able to be tested virtually          The longitudinal plan of care for migraine headaches was addressed during this visit. Due to the added complexity in care, I will continue to support Ms Simental in the subsequent management of this condition(s) and with the ongoing continuity of care of this condition(s).

## 2024-08-28 NOTE — PATIENT INSTRUCTIONS
Patient Education   Preventive Care Advice   This is general advice given by our system to help you stay healthy. However, your care team may have specific advice just for you. Please talk to your care team about your preventive care needs.  Nutrition  Eat 5 or more servings of fruits and vegetables each day.  Try wheat bread, brown rice and whole grain pasta (instead of white bread, rice, and pasta).  Get enough calcium and vitamin D. Check the label on foods and aim for 100% of the RDA (recommended daily allowance).  Lifestyle  Exercise at least 150 minutes each week  (30 minutes a day, 5 days a week).  Do muscle strengthening activities 2 days a week. These help control your weight and prevent disease.  No smoking.  Wear sunscreen to prevent skin cancer.  Have a dental exam and cleaning every 6 months.  Yearly exams  See your health care team every year to talk about:  Any changes in your health.  Any medicines your care team has prescribed.  Preventive care, family planning, and ways to prevent chronic diseases.  Shots (vaccines)   HPV shots (up to age 26), if you've never had them before.  Hepatitis B shots (up to age 59), if you've never had them before.  COVID-19 shot: Get this shot when it's due.  Flu shot: Get a flu shot every year.  Tetanus shot: Get a tetanus shot every 10 years.  Pneumococcal, hepatitis A, and RSV shots: Ask your care team if you need these based on your risk.  Shingles shot (for age 50 and up)  General health tests  Diabetes screening:  Starting at age 35, Get screened for diabetes at least every 3 years.  If you are younger than age 35, ask your care team if you should be screened for diabetes.  Cholesterol test: At age 39, start having a cholesterol test every 5 years, or more often if advised.  Bone density scan (DEXA): At age 50, ask your care team if you should have this scan for osteoporosis (brittle bones).  Hepatitis C: Get tested at least once in your life.  STIs (sexually  transmitted infections)  Before age 24: Ask your care team if you should be screened for STIs.  After age 24: Get screened for STIs if you're at risk. You are at risk for STIs (including HIV) if:  You are sexually active with more than one person.  You don't use condoms every time.  You or a partner was diagnosed with a sexually transmitted infection.  If you are at risk for HIV, ask about PrEP medicine to prevent HIV.  Get tested for HIV at least once in your life, whether you are at risk for HIV or not.  Cancer screening tests  Cervical cancer screening: If you have a cervix, begin getting regular cervical cancer screening tests starting at age 21.  Breast cancer scan (mammogram): If you've ever had breasts, begin having regular mammograms starting at age 40. This is a scan to check for breast cancer.  Colon cancer screening: It is important to start screening for colon cancer at age 45.  Have a colonoscopy test every 10 years (or more often if you're at risk) Or, ask your provider about stool tests like a FIT test every year or Cologuard test every 3 years.  To learn more about your testing options, visit:   .  For help making a decision, visit:   https://bit.ly/bl39884.  Prostate cancer screening test: If you have a prostate, ask your care team if a prostate cancer screening test (PSA) at age 55 is right for you.  Lung cancer screening: If you are a current or former smoker ages 50 to 80, ask your care team if ongoing lung cancer screenings are right for you.  For informational purposes only. Not to replace the advice of your health care provider. Copyright   2023 [employer]. All rights reserved. Clinically reviewed by the [facility] Transitions Program. [employer] [id number] - REV 01/24.

## 2024-08-28 NOTE — LETTER
8/28/2024      Deni Simental  [address] [facility] [address] [address]      Dear Colleague,    Thank you for referring your patient, Deni Simental, to the [facility] [place]. Please see a copy of my visit note below.    [facility]/[place]  Section of General Neurology  Return Patient  Virtual Visit    Deni Simental MRN# [id number]   Age: 43 year old YOB: 1980            Assessment and Plan:   Assessment:  Deni Simental is a very pleasant 43 year old female seen in follow up for migraine headaches.   They remain worse than previously after being pretty well controlled.   Emgality in fact appears to have made things worse though there was a miscommunication and she was on both this and Nurtec preventatively which may have been a factor.  Discussed options.  I think the time has come to trial botox additionally given they are still >15 days per month and not responding to many treatment modalities.   Discussed risk, benefit, side effects/teratogenicity etc considerations.  Will make changes as below.       Plan:  Increase nortriptyline to 75 mg at bedtime  Continue topamax 100 mg BID  Continue nurtec 75 mg EOD   Stop Emgality  Continue Ubrelvy 100 mg PRN  Headache clinic referral for botox consideration  Follow up with me in 2-3 months virtually         Charles Vega MD   of Neurology   [facility]      Interval history:   Emgality---20-30 minutes of injection got cramps this month, in month's past she wonders if triggered period.   On birth control which usually helps periods and stay on a schedule.    Ubrelvy rescue   Remained on emgality and nurtec both --miscommunication  Overall headaches are the same.    Open to botox.   ~15-17 headache days per month still.   Tolerating nortriptyline, topamax   Medrol dose pack--didn't help.   Discussed toradol as a future option     A/P at last  visit  Deni Simental is a very pleasant 43 year old female seen in follow up for migraine headaches.   They remain worse than previously but are improving after starting birth control, higher dose of topiramate may be kicking in, she notes.  Discussed options, will keep current meds the same, change Nurtec to preventative and add Ubrelvy as noted.  All questions answered.        Plan:  Continue topamax 100 mg twice a day  Continue nortriptyline 50 mg at bedtime  Change Nurtec to every other day  Add Ubrelvy 100 mg as needed  Side effects re discussed, teratogenicity noted, overall I think will continue to be well tolerated  Follow up in 3-4 months          Past Medical History:     Patient Active Problem List   Diagnosis     CARDIOVASCULAR SCREENING; LDL GOAL LESS THAN 160     Migraine with aura and without status migrainosus, not intractable     Hashimoto's thyroiditis     Thyroglobulin antibody positive     Alopecia     Fatigue, unspecified type     Poor sleep hygiene     Thyroid nodule     Adult ADHD     Past Medical History:   Diagnosis Date     Allergy     multiple     Ankle sprain 4/10     Arthritis      Depression     in teens -      Hashimoto's thyroiditis      IUD (intrauterine device) in place     mirena 05     Uncomplicated asthma         Past Surgical History:     Past Surgical History:   Procedure Laterality Date     HC TOOTH EXTRACTION W/FORCEP          Social History:     Social History     Tobacco Use     Smoking status: Former     Current packs/day: 0.00     Types: Cigarettes     Quit date: 2011     Years since quittin.9     Smokeless tobacco: Never     Tobacco comments:     Lives in smoke free household   Vaping Use     Vaping status: Never Used   Substance Use Topics     Alcohol use: No     Drug use: No        Family History:     Family History   Problem Relation Age of Onset     Alcohol/Drug Mother          MVA alcohol related     Diabetes Father         hypoglycemia      Hypertension Father      Psoriatic Arthritis Father      Psoriasis Father      Graves' disease Sister         Graves, tumor     Cerebrovascular Disease Maternal Grandmother      Hypothyroidism Paternal Grandmother      Cancer Paternal Grandfather         lung cancer     C.A.D. No family hx of      Depression No family hx of      Gastrointestinal Disease No family hx of      Heart Disease No family hx of      Lipids No family hx of      Neurologic Disorder No family hx of      Respiratory No family hx of      Glaucoma No family hx of      Macular Degeneration No family hx of         Medications:     Current Outpatient Medications   Medication Sig Dispense Refill     albuterol (PROAIR HFA/PROVENTIL HFA/VENTOLIN HFA) 108 (90 Base) MCG/ACT inhaler Inhale 1-2 puffs into the lungs every 4 hours as needed for shortness of breath, wheezing or cough 18 g 3     albuterol (PROVENTIL) (2.5 MG/3ML) 0.083% neb solution Inhale 2.5 mg into the lungs as needed       amphetamine-dextroamphetamine (ADDERALL XR) 20 MG 24 hr capsule Take 1 capsule (20 mg) by mouth daily 30 capsule 0     frovatriptan (FROVA) 2.5 MG tablet take day before cycle take twice a day x5-6 days 12 tablet 5     galcanezumab-gnlm (EMGALITY) 120 MG/ML injection Inject 2 mLs (240 mg) Subcutaneous every 28 days 2 mL 0     galcanezumab-gnlm (EMGALITY) 120 MG/ML injection Inject 1 mL (120 mg) Subcutaneous every 28 days 1 mL 11     levothyroxine (SYNTHROID/LEVOTHROID) 25 MCG tablet TAKE 1 TABLET BY MOUTH EVERY DAY 90 tablet 2     methylPREDNISolone (MEDROL DOSEPAK) 4 MG tablet therapy pack Follow Package Directions 21 tablet 0     nabumetone (RELAFEN) 750 MG tablet TAKE 1 TABLET BY MOUTH TWICE A  tablet 0     norgestrel-ethinyl estradiol (LO/OVRAL) 0.3-30 MG-MCG tablet Skip week 4 of packs 1-3, then take week 4 of pack 4 112 tablet 4     nortriptyline (PAMELOR) 50 MG capsule TAKE 1 CAPSULE BY MOUTH AT BEDTIME 90 capsule 2     NURTEC 75 MG ODT tablet PLACE 1  TABLET (75 MG) UNDER THE TONGUE EVERY 48 HOURS       topiramate (TOPAMAX) 100 MG tablet Go to 50 mg qam 100 mg at night x2 weeks then go to 100 mg  tablet 1     ubrogepant (UBRELVY) 100 MG tablet Take 1 tablet (100 mg) by mouth at onset of headache 8 tablet 5     No current facility-administered medications for this visit.        Allergies:     Allergies   Allergen Reactions     Wasp Venom Hives     Swelling past two joints     [place]on Flavor Hives     Red Dye #40 (Allura Red) Hives     Retin-A [Tretinoin] Hives        Review of Systems:   As noted above     Physical Exam:   General: Seated comfortably in no acute distress.  Neurologic:     Mental Status: Fully alert, attentive and oriented. Speech clear and fluent, no paraphasic errors.     Cranial Nerves:  Facial movements symmetric. Hearing not formally tested but intact to conversation.  No dysarthria.     Motor: No tremors or other abnormal movements observed.      Sensory:Not able to be tested virtually          The longitudinal plan of care for migraine headaches was addressed during this visit. Due to the added complexity in care, I will continue to support Ms Simental in the subsequent management of this condition(s) and with the ongoing continuity of care of this condition(s).      Deni is a 43 year old who is being evaluated via a billable video visit.    What phone number would you like to be contacted at? [phone]   How would you like to obtain your AVS? [email or web address]    Video-Visit Details    Type of service:  Video Visit   Originating Location (pt. Location): Home    Distant Location (provider location):  on site  Platform used for Video Visit:  ELPIDIO Carias, [facility] ([facility])    [id number] AM      Again, thank you for allowing me to participate in the care of your patient.        Sincerely,        Lev Vega MD

## 2024-08-28 NOTE — PROGRESS NOTES
Deni is a 43 year old who is being evaluated via a billable video visit.    What phone number would you like to be contacted at? [phone]   How would you like to obtain your AVS? MyChart    Video-Visit Details    Type of service:  Video Visit   Originating Location (pt. Location): Home    Distant Location (provider location):  on site  Platform used for Video Visit:  ELPIDIO Carias, [facility] ([facility])    [id number] AM

## 2024-08-28 NOTE — PATIENT INSTRUCTIONS
Stop emgality      Continue nurtec every other day  Continue topamax 100 mg twice a day--a next step pushing this higher  Increase nortriptyline to 75 mg at night    Ubrelvy 100 mg as needed    Botox/headache clinic referral     2-3 month video visit

## 2024-08-28 NOTE — PROGRESS NOTES
"Preventive Care Visit  [facility]  Radha Lay MD, Family Medicine  Aug 28, 2024      Assessment & Plan     (Z00.00) Routine general medical examination at a health care facility  (primary encounter diagnosis)  Comment: preventive needs reviewed   Plan: see orders in Epic.     (G43.E09) Chronic migraine with aura without status migrainosus, not intractable  Comment: some improvement with continuous ocp  Plan: levonorgestrel-ethinyl estradiol (NORDETTE)         0.15-30 MG-MCG tablet        Following with neurology, emgality seems to trigger period that then triggers ha    (N92.1) Menorrhagia with irregular cycle  Comment: controlled  Plan: levonorgestrel-ethinyl estradiol (NORDETTE)         0.15-30 MG-MCG tablet        Always hot, trial of different progestin to see if that helps with feeling hot   Refill x 6 months     (J45.20) Mild intermittent asthma without complication  Comment: stable controlled  Plan: albuterol (PROAIR HFA/PROVENTIL HFA/VENTOLIN         HFA) 108 (90 Base) MCG/ACT inhaler        Refill x 1 yr     (F90.9) Adult ADHD  Comment: stimulants not very helpful  Plan: guanFACINE (INTUNIV) 2 MG TB24 24 hr tablet        Trial of guanfacine  Follow-up via [employer] message            BMI  Estimated body mass index is 34.66 kg/m  as calculated from the following:    Height as of this encounter: 1.689 m (5' 6.5\").    Weight as of this encounter: 98.9 kg (218 lb).   Weight management plan: Discussed healthy diet and exercise guidelines    Counseling  Appropriate preventive services were addressed with this patient via screening, questionnaire, or discussion as appropriate for fall prevention, nutrition, physical activity, Tobacco-use cessation, social engagement, weight loss and cognition.  Checklist reviewing preventive services available has been given to the patient.  Reviewed patient's diet, addressing concerns and/or questions.   She is at risk for lack of exercise and has been " provided with information to increase physical activity for the benefit of her well-being.       See Patient Instructions    Leora Cheema is a 43 year old, presenting for the following:  Physical        8/28/2024     8:01 AM   Additional Questions   Roomed by Dieudonne CALLAWAY MA        Health Care Directive  Patient does not have a Health Care Directive or Living Will: Patient states has Advance Directive and will bring in a copy to clinic.    HPI  Migraines still an issue, she has been following with the neurology.  She is started on continuous OCPs for the menstrual aspect of her migraines and that seemed to help some.  Then started by neurology on Nurtec which helps with the acute headaches.  Had started on Emgality feels like every time she has an injection it triggers a.  Which then triggers a headache.  She continues on Topamax and nortriptyline follow-up today with neurology.    She stopped taking the levothyroxine several months ago and her recent TSH is in normal range.    She continues to complain of always feeling hot she has the thermostat set very low at home and freezing out her family.  She has difficulty sleeping because she is hot.  She does not have night sweats or hot flashes just feels hot.              8/25/2024   General Health   How would you rate your overall physical health? (!) FAIR   Feel stress (tense, anxious, or unable to sleep) Not at all            8/25/2024   Nutrition   Three or more servings of calcium each day? (!) NO   Diet: Gluten-free/reduced   How many servings of fruit and vegetables per day? (!) 0-1   How many sweetened beverages each day? (!) 2            8/25/2024   Exercise   Days per week of moderate/strenous exercise 2 days   Average minutes spent exercising at this level 20 min      (!) EXERCISE CONCERN      8/25/2024   Social Factors   Frequency of gathering with friends or relatives Patient declined   Worry food won't last until get money to buy more No   Food not  last or not have enough money for food? No   Do you have housing? (Housing is defined as stable permanent housing and does not include staying ouside in a car, in a tent, in an abandoned building, in an overnight shelter, or couch-surfing.) No   Are you worried about losing your housing? No   Lack of transportation? No   Unable to get utilities (heat,electricity)? No   Want help with housing or utility concern? No      (!) HOUSING CONCERN PRESENT      2024   Dental   Dentist two times every year? Yes            2024   TB Screening   Were you born outside of the [place]? Yes              Today's PHQ-2 Score:       2024    10:33 AM   PHQ-2 (  [employer])   Q1: Little interest or pleasure in doing things 0   Q2: Feeling down, depressed or hopeless 0   PHQ-2 Score 0         2024   Substance Use   Alcohol more than 3/day or more than 7/wk No   Do you use any other substances recreationally? No        Social History     Tobacco Use    Smoking status: Former     Current packs/day: 0.00     Types: Cigarettes     Quit date: 2011     Years since quittin.9    Smokeless tobacco: Never    Tobacco comments:     Lives in smoke free household   Vaping Use    Vaping status: Never Used   Substance Use Topics    Alcohol use: No    Drug use: No           2024   LAST FHS-7 RESULTS   1st degree relative breast or ovarian cancer No   Any relative bilateral breast cancer No   Any male have breast cancer No   Any ONE woman have BOTH breast AND ovarian cancer No   Any woman with breast cancer before 50yrs No   2 or more relatives with breast AND/OR ovarian cancer No   2 or more relatives with breast AND/OR bowel cancer No           Mammogram Screening - Mammogram every 1-2 years updated in Health Maintenance based on mutual decision making    G 3 P 2   Patient's last menstrual period was 2024 (exact date).     Fasting: No   Td: tdap 2024       Flu: declined      Covid: 2021      Shingrix:  NA      PPV: discussed       RSV: NA             Cholesterol:   Lab Results   Component Value Date    CHOL 220 2020     Lab Results   Component Value Date    HDL 48 2020     Lab Results   Component Value Date     2020     Lab Results   Component Value Date    TRIG 129 2020     Lab Results   Component Value Date    CHOLHDLRATIO 4.4 2010         MM2024  Dexa:  NA     Flex/colo: NA      Seat Belt: Yes    Sunscreen use: Yes   Calcium Intake: adeq  Health Care Directive: Yes   Sexually Active: Yes     Current contraception: OCP  History of abnormal Pap smear: No  Family history of colon/breast/ovarian cancer: No  Regular self breast exam: Yes  History of abnormal mammogram: No          2024   STI Screening   New sexual partner(s) since last STI/HIV test? No        History of abnormal Pap smear: No - age 30- 64 PAP with HPV every 5 years recommended        Latest Ref Rng & Units 2023     9:32 AM 10/1/2018     9:50 AM 10/1/2018     9:45 AM   PAP / HPV   PAP  Negative for Intraepithelial Lesion or Malignancy (NILM)      PAP (Historical)    NIL    HPV 16 DNA Negative Negative  Negative     HPV 18 DNA Negative Negative  Negative     Other HR HPV Negative Negative  Negative       ASCVD Risk   The 10-year ASCVD risk score (Raphael SALAS, et al., 2019) is: 1%    Values used to calculate the score:      Age: 43 years      Sex: Female      Is Non- : No      Diabetic: No      Tobacco smoker: No      Systolic Blood Pressure: 130 mmHg      Is BP treated: No      HDL Cholesterol: 48 mg/dL      Total Cholesterol: 220 mg/dL        2024   Contraception/Family Planning   Questions about contraception or family planning No           Reviewed and updated as needed this visit by Provider                    Labs reviewed in EPIC  BP Readings from Last 3 Encounters:   24 130/88   24 (!) 141/94   23 128/85    Wt Readings from Last 3  Encounters:   24 98.9 kg (218 lb)   24 99.9 kg (220 lb 3.2 oz)   23 105.7 kg (233 lb)                  Patient Active Problem List   Diagnosis    CARDIOVASCULAR SCREENING; LDL GOAL LESS THAN 160    Migraine with aura and without status migrainosus, not intractable    Hashimoto's thyroiditis    Thyroglobulin antibody positive    Alopecia    Fatigue, unspecified type    Poor sleep hygiene    Thyroid nodule    Adult ADHD     Past Surgical History:   Procedure Laterality Date    HC TOOTH EXTRACTION W/FORCEP         Social History     Tobacco Use    Smoking status: Former     Current packs/day: 0.00     Types: Cigarettes     Quit date: 2011     Years since quittin.9    Smokeless tobacco: Never    Tobacco comments:     Lives in smoke free household   Substance Use Topics    Alcohol use: No     Family History   Problem Relation Age of Onset    Alcohol/Drug Mother          MVA alcohol related    Diabetes Father         hypoglycemia    Hypertension Father     Psoriatic Arthritis Father     Psoriasis Father     Graves' disease Sister         Graves, tumor    Cerebrovascular Disease Maternal Grandmother     Hypothyroidism Paternal Grandmother     Cancer Paternal Grandfather         lung cancer    C.A.D. No family hx of     Depression No family hx of     Gastrointestinal Disease No family hx of     Heart Disease No family hx of     Lipids No family hx of     Neurologic Disorder No family hx of     Respiratory No family hx of     Glaucoma No family hx of     Macular Degeneration No family hx of          Current Outpatient Medications   Medication Sig Dispense Refill    albuterol (PROAIR HFA/PROVENTIL HFA/VENTOLIN HFA) 108 (90 Base) MCG/ACT inhaler Inhale 1-2 puffs into the lungs every 4 hours as needed for shortness of breath, wheezing or cough. 18 g 3    albuterol (PROVENTIL) (2.5 MG/3ML) 0.083% neb solution Inhale 2.5 mg into the lungs as needed      galcanezumab-gnlm (EMGALITY) 120 MG/ML  "injection Inject 1 mL (120 mg) Subcutaneous every 28 days 1 mL 11    guanFACINE (INTUNIV) 2 MG TB24 24 hr tablet Take 1 tablet (2 mg) by mouth at bedtime. 30 tablet 0    levonorgestrel-ethinyl estradiol (NORDETTE) 0.15-30 MG-MCG tablet Take 1 tablet by mouth daily. 112 tablet 1    nabumetone (RELAFEN) 750 MG tablet TAKE 1 TABLET BY MOUTH TWICE A  tablet 0    NURTEC 75 MG ODT tablet PLACE 1 TABLET (75 MG) UNDER THE TONGUE EVERY 48 HOURS      topiramate (TOPAMAX) 100 MG tablet Go to 50 mg qam 100 mg at night x2 weeks then go to 100 mg  tablet 1    ubrogepant (UBRELVY) 100 MG tablet Take 1 tablet (100 mg) by mouth at onset of headache 8 tablet 5    nortriptyline (PAMELOR) 75 MG capsule TAKE 1 CAPSULE BY MOUTH AT BEDTIME 90 capsule 1         Review of Systems  Constitutional, neuro, ENT, endocrine, pulmonary, cardiac, gastrointestinal, genitourinary, musculoskeletal, integument and psychiatric systems are negative, except as otherwise noted.     Objective    Exam  /88   Pulse 96   Temp 97.3  F (36.3  C) (Tympanic)   Resp 16   Ht 1.689 m (5' 6.5\")   Wt 98.9 kg (218 lb)   LMP 08/24/2024 (Exact Date)   SpO2 98%   Breastfeeding No   BMI 34.66 kg/m     Estimated body mass index is 34.66 kg/m  as calculated from the following:    Height as of this encounter: 1.689 m (5' 6.5\").    Weight as of this encounter: 98.9 kg (218 lb).    Physical Exam  GENERAL: alert and no distress  EYES: Eyes grossly normal to inspection, PERRL and conjunctivae and sclerae normal  HENT: ear canals and TM's normal, nose and mouth without ulcers or lesions  NECK: no adenopathy, no asymmetry, masses, or scars  RESP: lungs clear to auscultation - no rales, rhonchi or wheezes  BREAST: normal without masses, tenderness or nipple discharge and no palpable axillary masses or adenopathy  CV: regular rate and rhythm, normal S1 S2, no S3 or S4, no murmur, click or rub, no peripheral edema  ABDOMEN: soft, nontender, no " hepatosplenomegaly, no masses and bowel sounds normal  MS: no gross musculoskeletal defects noted, no edema  SKIN: no suspicious lesions or rashes  NEURO: Normal strength and tone, mentation intact and speech normal  PSYCH: mentation appears normal, affect normal/bright        Signed Electronically by: Radha Lay MD

## 2024-10-14 ENCOUNTER — NURSE TRIAGE (OUTPATIENT)
Dept: FAMILY MEDICINE | Facility: CLINIC | Age: 44
End: 2024-10-14
Payer: COMMERCIAL

## 2024-10-14 NOTE — TELEPHONE ENCOUNTER
Patient is calling asking if we make appointments for Toradol injections.  She has had a migraine for about 3 weeks. Patient report that she has had headache, nausea, diarrhea and sensitivity to light and sound. She reports that she does have some facial numbness, but this is not new as she does get this with her migraines.   The headaches is currently rated at an 8/10    NURTEC 75 MG ODT tablet - she has been taking daily for the last 3 days. It is usually every other day. Rescue med.   nortriptyline (PAMELOR) 75 MG capsule - controller medication  topiramate (TOPAMAX) 100 MG tablet - controller medication    Nursing advise: Due to the length of time she has had this headache and the severity of her headache she is to be assessed now at the E.R. Patient verbalized good understanding, agrees with plan and needs no further support.  Thank you. Kaycee Ansari R.N.    Reason for Disposition    SEVERE headache (e.g., excruciating) and has had severe headaches before    Additional Information    Negative: ACUTE NEUROLOGIC SYMPTOM and symptom present now    Negative: Knocked out (unconscious) > 1 minute    Negative: Difficult to awaken or acting confused (e.g., disoriented, slurred speech)    Negative: Weakness of the face, arm or leg on one side of the body and new-onset    Negative: Numbness of the face, arm or leg on one side of the body and new-onset    Negative: Loss of speech or garbled speech and new-onset    Negative: Passed out (i.e., fainted, collapsed and was not responding)    Negative: Sounds like a life-threatening emergency to the triager    Negative: Unable to walk without falling    Negative: Stiff neck (can't touch chin to chest)    Negative: Possibility of carbon monoxide exposure    Negative: SEVERE headache, states 'worst headache' of life    Negative: SEVERE headache, sudden-onset (i.e., reaching maximum intensity within seconds to 1 hour)    Negative: Severe pain in one eye    Negative: Loss of vision  or double vision  (Exception: Same as prior migraines.)    Negative: Patient sounds very sick or weak to the triager    Negative: Fever > 103 F (39.4 C)    Negative: Fever > 100.0 F (37.8 C) and has diabetes mellitus or a weak immune system (e.g., HIV positive, cancer chemotherapy, organ transplant, splenectomy, chronic steroids)    Protocols used: Head Injury-A-OH, Headache-A-OH

## 2024-10-14 NOTE — TELEPHONE ENCOUNTER
Provider: It states call back from PCP in 1 hour, but I have advised the patient to go to the E.R. due to the length of time she has had the headache and the severity  of her headache. I am not sure urgent care is appropriate for her.  Do you advise anything differently?  Thank you. Kaycee Ansari R.N.

## 2024-10-21 DIAGNOSIS — M79.641 PAIN IN BOTH HANDS: ICD-10-CM

## 2024-10-21 DIAGNOSIS — F90.9 ADULT ADHD: ICD-10-CM

## 2024-10-21 DIAGNOSIS — M79.642 PAIN IN BOTH HANDS: ICD-10-CM

## 2024-10-21 RX ORDER — GUANFACINE 2 MG/1
2 TABLET, EXTENDED RELEASE ORAL AT BEDTIME
Qty: 30 TABLET | Refills: 0 | Status: SHIPPED | OUTPATIENT
Start: 2024-10-21

## 2024-10-23 DIAGNOSIS — G43.019 INTRACTABLE MIGRAINE WITHOUT AURA AND WITHOUT STATUS MIGRAINOSUS: ICD-10-CM

## 2024-10-23 RX ORDER — TOPIRAMATE 100 MG/1
TABLET, FILM COATED ORAL
Qty: 270 TABLET | Refills: 1 | Status: SHIPPED | OUTPATIENT
Start: 2024-10-23

## 2024-10-23 NOTE — TELEPHONE ENCOUNTER
See last mychart message dose was increased      Pending Prescriptions:                       Disp   Refills    topiramate (TOPAMAX) 100 MG tablet        180 ta*1            Sig: Go to 50 mg qam 100 mg at night x2 weeks then go           to 100 mg BID       Requested Pharmacy: [facility] [id number] [place] [facility] [place] - [address] [place] [address]     Pt's last office visit: 08/28/2024  Next scheduled office visit: 11/11/2024      Per the RN/LPN medication refill protocol, writer is unable to refill this request.

## 2024-11-04 ENCOUNTER — OFFICE VISIT (OUTPATIENT)
Dept: FAMILY MEDICINE | Facility: CLINIC | Age: 44
End: 2024-11-04
Payer: COMMERCIAL

## 2024-11-04 ENCOUNTER — ANCILLARY PROCEDURE (OUTPATIENT)
Dept: GENERAL RADIOLOGY | Facility: CLINIC | Age: 44
End: 2024-11-04
Attending: FAMILY MEDICINE
Payer: COMMERCIAL

## 2024-11-04 VITALS
HEART RATE: 111 BPM | WEIGHT: 215 LBS | RESPIRATION RATE: 20 BRPM | OXYGEN SATURATION: 99 % | HEIGHT: 67 IN | DIASTOLIC BLOOD PRESSURE: 92 MMHG | SYSTOLIC BLOOD PRESSURE: 136 MMHG | BODY MASS INDEX: 33.74 KG/M2 | TEMPERATURE: 98.4 F

## 2024-11-04 DIAGNOSIS — M25.579 PAIN IN JOINT, ANKLE AND FOOT, UNSPECIFIED LATERALITY: Primary | ICD-10-CM

## 2024-11-04 DIAGNOSIS — M25.50 ARTHRALGIA, UNSPECIFIED JOINT: ICD-10-CM

## 2024-11-04 DIAGNOSIS — M25.579 PAIN IN JOINT, ANKLE AND FOOT, UNSPECIFIED LATERALITY: ICD-10-CM

## 2024-11-04 PROCEDURE — G2211 COMPLEX E/M VISIT ADD ON: HCPCS | Performed by: FAMILY MEDICINE

## 2024-11-04 PROCEDURE — 73610 X-RAY EXAM OF ANKLE: CPT | Mod: TC | Performed by: RADIOLOGY

## 2024-11-04 PROCEDURE — 99213 OFFICE O/P EST LOW 20 MIN: CPT | Performed by: FAMILY MEDICINE

## 2024-11-04 ASSESSMENT — PAIN SCALES - GENERAL: PAINLEVEL_OUTOF10: MODERATE PAIN (4)

## 2024-11-04 NOTE — PROGRESS NOTES
Assessment & Plan     (M25.579) Pain in joint, ankle and foot, unspecified laterality  (primary encounter diagnosis)  Comment: unknown etiology, consider lipoma as cause of confined edema  Plan: XR Ankle Bilateral G/E 3 Views, Orthopedic          Referral        Awaiting radiologist read of x-rays    (M25.50) Arthralgia, unspecified joint  Comment: suspect inflammatory arthritis of hands, repeated positive MARISELA   Plan: Adult Rheumatology  Referral    The longitudinal plan of care for the diagnosis(es)/condition(s) as documented were addressed during this visit. Due to the added complexity in care, I will continue to support Deni in the subsequent management and with ongoing continuity of care.      Leora Cheema is a 43 year old, presenting for the following health issues:  Ankle Pain and Edema        11/4/2024    10:41 AM   Additional Questions   Roomed by Ike     History of Present Illness       Reason for visit:  Both ankles hurt and are swollen  Symptom onset:  1-2 weeks ago  Symptoms include:  Painful swollen ankles (swelling in ankles present at all times.  Swelling has started intermittently in both hands at first finger)  Symptom intensity:  Moderate  Symptom progression:  Staying the same  Had these symptoms before:  No  What makes it worse:  Walking and standing  What makes it better:  No   She is taking medications regularly.         Deni presents with bilateral lower extremity edema and ankle pain. Reports ongoing for 3 weeks. Edema is described as pitting, feels warm to the touch. Occurs only on the medial aspect of the lower leg, above the malleolus, extending about 2 inches up the leg. Denies changes in skin color or numbness/tingling associated with edema. Reports baseline generalized joint pain not previously in ankles. Has been wearing compression stockings at work with no change or relief of symptoms. Elevation, epsom salt soaks, and heat application are not  "effective. No known injury to the area. No edema, redness, pain, or numbness/tingling in the feet.  Also notes swelling and pain of bilateral first finger joints with associated redness and heat.    Review of Systems  Constitutional, HEENT, cardiovascular, pulmonary, gi and gu systems are negative, except as otherwise noted.      Objective    BP (!) 136/92   Pulse 111   Temp 98.4  F (36.9  C) (Oral)   Resp 20   Ht 1.689 m (5' 6.5\")   Wt 97.5 kg (215 lb)   LMP 10/30/2024 (Exact Date)   SpO2 99%   BMI 34.18 kg/m    Body mass index is 34.18 kg/m .  Physical Exam   GENERAL: alert and no distress  RESP: lungs clear to auscultation - no rales, rhonchi or wheezes  CV: regular rate and rhythm, normal S1 S2, no S3 or S4, no murmur, click or rub, no peripheral edema  MS: no gross musculoskeletal defects noted. Swelling and redness noted on bilateral MCP joint of first fingers. Palpable areas of edema on medial aspects of bilateral lower legs above the malleolus, L>R. Areas are mass like - possible lipomas?  NEURO: Normal strength and tone, mentation intact and speech normal  PSYCH: mentation appears normal, affect normal/bright    X-ray of bilateral ankles in process, awaiting radiologist read.        Signed Electronically by: Radha Lay MD    "

## 2024-11-05 ENCOUNTER — PATIENT OUTREACH (OUTPATIENT)
Dept: CARE COORDINATION | Facility: CLINIC | Age: 44
End: 2024-11-05
Payer: COMMERCIAL

## 2024-11-10 ASSESSMENT — HEADACHE IMPACT TEST (HIT 6)
WHEN YOU HAVE A HEADACHE HOW OFTEN DO YOU WISH YOU COULD LIE DOWN: ALWAYS
HOW OFTEN HAVE YOU FELT FED UP OR IRRITATED BECAUSE OF YOUR HEADACHES: VERY OFTEN
HOW OFTEN DO HEADACHES LIMIT YOUR DAILY ACTIVITIES: VERY OFTEN
HOW OFTEN HAVE YOU FELT TOO TIRED TO WORK BECAUSE OF YOUR HEADACHES: ALWAYS
HIT6 TOTAL SCORE: 70
WHEN YOU HAVE HEADACHES HOW OFTEN IS THE PAIN SEVERE: VERY OFTEN
HOW OFTEN DID HEADACHS LIMIT CONCENTRATION ON WORK OR DAILY ACTIVITY: VERY OFTEN

## 2024-11-10 ASSESSMENT — MIGRAINE DISABILITY ASSESSMENT (MIDAS)
HOW OFTEN WERE SOCIAL ACTIVITIES MISSED DUE TO HEADACHES: 20
HOW MANY DAYS WAS HOUSEWORK PRODUCTIVITY CUT IN HALF DUE TO HEADACHES: 10
ON A SCALE FROM 0-10 ON AVERAGE HOW PAINFUL WERE HEADACHES: 6
HOW MANY DAYS DID YOU MISS WORK OR SCHOOL BECAUSE OF HEADACHES: 9
HOW MANY DAYS IN THE PAST 3 MONTHS HAVE YOU HAD A HEADACHE: 80
HOW MANY DAYS DID YOU NOT DO HOUSEWORK BECAUSE OF HEADACHES: 75
TOTAL SCORE: 114
HOW MANY DAYS WAS YOUR PRODUCTIVITY CUT IN HALF BECAUSE OF HEADACHES: 0

## 2024-11-11 ENCOUNTER — VIRTUAL VISIT (OUTPATIENT)
Dept: NEUROLOGY | Facility: CLINIC | Age: 44
End: 2024-11-11
Payer: COMMERCIAL

## 2024-11-11 DIAGNOSIS — G43.839 INTRACTABLE MENSTRUAL MIGRAINE WITHOUT STATUS MIGRAINOSUS: ICD-10-CM

## 2024-11-11 DIAGNOSIS — R51.9 WORSENING HEADACHES: Primary | ICD-10-CM

## 2024-11-11 PROCEDURE — G2211 COMPLEX E/M VISIT ADD ON: HCPCS | Performed by: STUDENT IN AN ORGANIZED HEALTH CARE EDUCATION/TRAINING PROGRAM

## 2024-11-11 PROCEDURE — 99214 OFFICE O/P EST MOD 30 MIN: CPT | Mod: 95 | Performed by: STUDENT IN AN ORGANIZED HEALTH CARE EDUCATION/TRAINING PROGRAM

## 2024-11-11 RX ORDER — PREDNISONE 50 MG/1
50 TABLET ORAL DAILY
Qty: 5 TABLET | Refills: 0 | Status: SHIPPED | OUTPATIENT
Start: 2024-11-11

## 2024-11-11 RX ORDER — PROPRANOLOL HYDROCHLORIDE 60 MG/1
60 CAPSULE, EXTENDED RELEASE ORAL DAILY
Qty: 90 CAPSULE | Refills: 1 | Status: SHIPPED | OUTPATIENT
Start: 2024-11-11

## 2024-11-11 NOTE — PROGRESS NOTES
Deni is a 44 year old who is being evaluated via a billable video visit.    How would you like to obtain your AVS? [employer]  If the video visit is dropped, the invitation should be resent by: Text to cell phone: [phone]  Will anyone else be joining your video visit? No    Video-Visit Details    Type of service:  Video Visit   Originating Location (pt. Location): Home    Distant Location (provider location):  Off-site  Platform used for Video Visit: [employer]   Timing 1249-:102 PM

## 2024-11-11 NOTE — PROGRESS NOTES
[facility]/[place]  Section of General Neurology  Return Patient  Virtual Visit    Deni Simental MRN# [id number]   Age: 44 year old YOB: 1980            Assessment and Plan:   Deni Simental is a very pleasant 44 year old female seen in follow up for migraine headaches.   They remain worse than previously after being pretty well controlled.  Discussed options.  I do think we should rule out secondary causes further as below given how long this has gone on for.  Long term hopefully when doing better can wean her lower or off some of these.  For now to discussion given how much they are limiting her life/often essentially daily will continue as below.      Plan:  Continue nortriptyline to 75 mg at bedtime  Continue topamax 100 qam /200 QHS  Continue nurtec 75 mg EOD   Continue Ubrelvy 100 mg PRN  Headache clinic appointment tomorrow to discuss botox among other options  MRI brain w/w/o to ensure no secondary cause given how refractory this has been  Addition of propranolol 60 mg daily, side effects reviewed  Prednisone 50 mg x5 days to break headache cycle  Follow up with me in ~3 months virtually or in person           Charles Vega MD   of Neurology   [facility]      Interval history:     Subsequently required ER visit--helped minimally  Has ER visit with Martha Kwon tomorrow   Current medications  Topiramate-- 100/200   Nortriptyline 75 mg  Nurtec every other day  Steroids haven't helped anything  Toradol didn't help  Ubrelvy not helping  Pretty much daily.   Slept most of yesterday     A/P at last visit  Deni Simental is a very pleasant 43 year old female seen in follow up for migraine headaches.   They remain worse than previously after being pretty well controlled.   Emgality in fact appears to have made things worse though there was a miscommunication and she was on both this and Nurtec preventatively which may have been  a factor.  Discussed options.  I think the time has come to trial botox additionally given they are still >15 days per month and not responding to many treatment modalities.   Discussed risk, benefit, side effects/teratogenicity etc considerations.  Will make changes as below.       Plan:  Increase nortriptyline to 75 mg at bedtime  Continue topamax 100 mg BID  Continue nurtec 75 mg EOD   Stop Emgality  Continue Ubrelvy 100 mg PRN  Headache clinic referral for botox consideration  Follow up with me in 2-3 months virtually        Past Medical History:     Patient Active Problem List   Diagnosis    CARDIOVASCULAR SCREENING; LDL GOAL LESS THAN 160    Migraine with aura and without status migrainosus, not intractable    Hashimoto's thyroiditis    Thyroglobulin antibody positive    Alopecia    Fatigue, unspecified type    Poor sleep hygiene    Thyroid nodule    Adult ADHD     Past Medical History:   Diagnosis Date    Allergy     multiple    Ankle sprain 4/10    Arthritis     Depression     in teens -     Hashimoto's thyroiditis     IUD (intrauterine device) in place     mirena 05    Uncomplicated asthma         Past Surgical History:     Past Surgical History:   Procedure Laterality Date    HC TOOTH EXTRACTION W/FORCEP          Social History:     Social History     Tobacco Use    Smoking status: Former     Current packs/day: 0.00     Types: Cigarettes     Quit date: 2011     Years since quittin.1    Smokeless tobacco: Never    Tobacco comments:     Lives in smoke free household   Vaping Use    Vaping status: Never Used   Substance Use Topics    Alcohol use: No    Drug use: No        Family History:     Family History   Problem Relation Age of Onset    Alcohol/Drug Mother          MVA alcohol related    Diabetes Father         hypoglycemia    Hypertension Father     Psoriatic Arthritis Father     Psoriasis Father     Graves' disease Sister         Graves, tumor    Cerebrovascular Disease Maternal  Grandmother     Hypothyroidism Paternal Grandmother     Cancer Paternal Grandfather         lung cancer    C.A.D. No family hx of     Depression No family hx of     Gastrointestinal Disease No family hx of     Heart Disease No family hx of     Lipids No family hx of     Neurologic Disorder No family hx of     Respiratory No family hx of     Glaucoma No family hx of     Macular Degeneration No family hx of         Medications:     Current Outpatient Medications   Medication Sig Dispense Refill    albuterol (PROAIR HFA/PROVENTIL HFA/VENTOLIN HFA) 108 (90 Base) MCG/ACT inhaler Inhale 1-2 puffs into the lungs every 4 hours as needed for shortness of breath, wheezing or cough. 18 g 3    albuterol (PROVENTIL) (2.5 MG/3ML) 0.083% neb solution Inhale 2.5 mg into the lungs as needed      guanFACINE (INTUNIV) 2 MG TB24 24 hr tablet TAKE 1 TABLET BY MOUTH AT BEDTIME 30 tablet 0    levonorgestrel-ethinyl estradiol (NORDETTE) 0.15-30 MG-MCG tablet Take 1 tablet by mouth daily. 112 tablet 1    nabumetone (RELAFEN) 750 MG tablet TAKE 1 TABLET BY MOUTH TWICE A  tablet 0    nortriptyline (PAMELOR) 75 MG capsule TAKE 1 CAPSULE BY MOUTH AT BEDTIME 90 capsule 1    NURTEC 75 MG ODT tablet PLACE 1 TABLET (75 MG) UNDER THE TONGUE EVERY 48 HOURS      topiramate (TOPAMAX) 100 MG tablet 100 mg qam 200 mg at bedtime 270 tablet 1     No current facility-administered medications for this visit.        Allergies:     Allergies   Allergen Reactions    Wasp Venom Hives     Swelling past two joints    Higinio Flavor Hives    Red Dye #40 (Allura Red) Hives    Retin-A [Tretinoin] Hives        Review of Systems:   As noted above     Physical Exam:   General: Seated comfortably in no acute distress.  Neurologic:     Mental Status: Fully alert, attentive and oriented. Speech clear and fluent, no paraphasic errors.     Cranial Nerves:. Facial movements symmetric. Hearing not formally tested but intact to conversation.  No dysarthria.     Motor: No  tremors or other abnormal movements observed.                          The total time of this encounter today amounted to 32 minutes in total. This time included time spent with the patient, prep work, ordering medications, and performing post visit documentation.    The longitudinal plan of care for migraine headaches was addressed during this visit. Due to the added complexity in care, I will continue to support Ms Simental in the subsequent management of this condition(s) and with the ongoing continuity of care of this condition(s).

## 2024-11-11 NOTE — LETTER
11/11/2024      Deni Simental  [address] [place] [id number]      Dear Colleague,    Thank you for referring your patient, Deni Simental, to the [facility] [place]. Please see a copy of my visit note below.    [facility]/[place]  Section of General Neurology  Return Patient  Virtual Visit    Deni Simental MRN# [id number]   Age: 44 year old YOB: 1980            Assessment and Plan:   Deni Simental is a very pleasant 44 year old female seen in follow up for migraine headaches.   They remain worse than previously after being pretty well controlled.  Discussed options.  I do think we should rule out secondary causes further as below given how long this has gone on for.  Long term hopefully when doing better can wean her lower or off some of these.  For now to discussion given how much they are limiting her life/often essentially daily will continue as below.      Plan:  Continue nortriptyline to 75 mg at bedtime  Continue topamax 100 qam /200 QHS  Continue nurtec 75 mg EOD   Continue Ubrelvy 100 mg PRN  Headache clinic appointment tomorrow to discuss botox among other options  MRI brain w/w/o to ensure no secondary cause given how refractory this has been  Addition of propranolol 60 mg daily, side effects reviewed  Prednisone 50 mg x5 days to break headache cycle  Follow up with me in ~3 months virtually or in person           Charles Vega MD   of Neurology   [facility]      Interval history:     Subsequently required ER visit--helped minimally  Has ER visit with Martha Kwon tomorrow   Current medications  Topiramate-- 100/200   Nortriptyline 75 mg  Nurtec every other day  Steroids haven't helped anything  Toradol didn't help  Ubrelvy not helping  Pretty much daily.   Slept most of yesterday     A/P at last visit  Deni Simental is a very pleasant 43 year old female seen in  follow up for migraine headaches.   They remain worse than previously after being pretty well controlled.   Emgality in fact appears to have made things worse though there was a miscommunication and she was on both this and Nurtec preventatively which may have been a factor.  Discussed options.  I think the time has come to trial botox additionally given they are still >15 days per month and not responding to many treatment modalities.   Discussed risk, benefit, side effects/teratogenicity etc considerations.  Will make changes as below.       Plan:  Increase nortriptyline to 75 mg at bedtime  Continue topamax 100 mg BID  Continue nurtec 75 mg EOD   Stop Emgality  Continue Ubrelvy 100 mg PRN  Headache clinic referral for botox consideration  Follow up with me in 2-3 months virtually        Past Medical History:     Patient Active Problem List   Diagnosis     CARDIOVASCULAR SCREENING; LDL GOAL LESS THAN 160     Migraine with aura and without status migrainosus, not intractable     Hashimoto's thyroiditis     Thyroglobulin antibody positive     Alopecia     Fatigue, unspecified type     Poor sleep hygiene     Thyroid nodule     Adult ADHD     Past Medical History:   Diagnosis Date     Allergy     multiple     Ankle sprain 4/10     Arthritis      Depression     in teens -      Hashimoto's thyroiditis      IUD (intrauterine device) in place     mirena 05     Uncomplicated asthma         Past Surgical History:     Past Surgical History:   Procedure Laterality Date     HC TOOTH EXTRACTION W/FORCEP          Social History:     Social History     Tobacco Use     Smoking status: Former     Current packs/day: 0.00     Types: Cigarettes     Quit date: 2011     Years since quittin.1     Smokeless tobacco: Never     Tobacco comments:     Lives in smoke free household   Vaping Use     Vaping status: Never Used   Substance Use Topics     Alcohol use: No     Drug use: No        Family History:     Family History    Problem Relation Age of Onset     Alcohol/Drug Mother          MVA alcohol related     Diabetes Father         hypoglycemia     Hypertension Father      Psoriatic Arthritis Father      Psoriasis Father      Graves' disease Sister         Graves, tumor     Cerebrovascular Disease Maternal Grandmother      Hypothyroidism Paternal Grandmother      Cancer Paternal Grandfather         lung cancer     C.A.D. No family hx of      Depression No family hx of      Gastrointestinal Disease No family hx of      Heart Disease No family hx of      Lipids No family hx of      Neurologic Disorder No family hx of      Respiratory No family hx of      Glaucoma No family hx of      Macular Degeneration No family hx of         Medications:     Current Outpatient Medications   Medication Sig Dispense Refill     albuterol (PROAIR HFA/PROVENTIL HFA/VENTOLIN HFA) 108 (90 Base) MCG/ACT inhaler Inhale 1-2 puffs into the lungs every 4 hours as needed for shortness of breath, wheezing or cough. 18 g 3     albuterol (PROVENTIL) (2.5 MG/3ML) 0.083% neb solution Inhale 2.5 mg into the lungs as needed       guanFACINE (INTUNIV) 2 MG TB24 24 hr tablet TAKE 1 TABLET BY MOUTH AT BEDTIME 30 tablet 0     levonorgestrel-ethinyl estradiol (NORDETTE) 0.15-30 MG-MCG tablet Take 1 tablet by mouth daily. 112 tablet 1     nabumetone (RELAFEN) 750 MG tablet TAKE 1 TABLET BY MOUTH TWICE A  tablet 0     nortriptyline (PAMELOR) 75 MG capsule TAKE 1 CAPSULE BY MOUTH AT BEDTIME 90 capsule 1     NURTEC 75 MG ODT tablet PLACE 1 TABLET (75 MG) UNDER THE TONGUE EVERY 48 HOURS       topiramate (TOPAMAX) 100 MG tablet 100 mg qam 200 mg at bedtime 270 tablet 1     No current facility-administered medications for this visit.        Allergies:     Allergies   Allergen Reactions     Wasp Venom Hives     Swelling past two joints     Higinio Flavor Hives     Red Dye #40 (Allura Red) Hives     Retin-A [Tretinoin] Hives        Review of Systems:   As noted above      Physical Exam:   General: Seated comfortably in no acute distress.  Neurologic:     Mental Status: Fully alert, attentive and oriented. Speech clear and fluent, no paraphasic errors.     Cranial Nerves:. Facial movements symmetric. Hearing not formally tested but intact to conversation.  No dysarthria.     Motor: No tremors or other abnormal movements observed.                          The total time of this encounter today amounted to 32 minutes in total. This time included time spent with the patient, prep work, ordering medications, and performing post visit documentation.    The longitudinal plan of care for migraine headaches was addressed during this visit. Due to the added complexity in care, I will continue to support Ms Simental in the subsequent management of this condition(s) and with the ongoing continuity of care of this condition(s).      Deni is a 44 year old who is being evaluated via a billable video visit.    How would you like to obtain your AVS? MyChart  If the video visit is dropped, the invitation should be resent by: Text to cell phone: [phone]  Will anyone else be joining your video visit? No    Video-Visit Details    Type of service:  Video Visit   Originating Location (pt. Location): Home    Distant Location (provider location):  Off-site  Platform used for Video Visit: PhatNo[employer]   Timing 1249-:102 PM      Again, thank you for allowing me to participate in the care of your patient.        Sincerely,        Lev Vega MD

## 2024-11-12 ENCOUNTER — OFFICE VISIT (OUTPATIENT)
Dept: NEUROLOGY | Facility: CLINIC | Age: 44
End: 2024-11-12
Attending: STUDENT IN AN ORGANIZED HEALTH CARE EDUCATION/TRAINING PROGRAM
Payer: COMMERCIAL

## 2024-11-12 VITALS
HEART RATE: 103 BPM | OXYGEN SATURATION: 98 % | DIASTOLIC BLOOD PRESSURE: 88 MMHG | HEIGHT: 66 IN | SYSTOLIC BLOOD PRESSURE: 129 MMHG | BODY MASS INDEX: 34.55 KG/M2 | WEIGHT: 215 LBS

## 2024-11-12 DIAGNOSIS — G43.711 INTRACTABLE CHRONIC MIGRAINE WITHOUT AURA AND WITH STATUS MIGRAINOSUS: ICD-10-CM

## 2024-11-12 PROCEDURE — 99214 OFFICE O/P EST MOD 30 MIN: CPT

## 2024-11-12 PROCEDURE — G2211 COMPLEX E/M VISIT ADD ON: HCPCS

## 2024-11-12 ASSESSMENT — PAIN SCALES - GENERAL: PAINLEVEL_OUTOF10: MODERATE PAIN (4)

## 2024-11-12 NOTE — LETTER
11/12/2024      Deni Simental  [id number] [facility] [id number]      Dear Colleague,    Thank you for referring your patient, Deni Simental, to the [facility] NEUROLOGY [facility]ICS [facility]. Please see a copy of my visit note below.    [facility]   Headache Neurology Consult    November 12, 2024     Deni Simental MRN# [id number]   YOB: 1980 Age: 44 year old     Referring provider: Lev Vega          Assessment and Recommendations:     Deni Simental is a 44 year old female who presents for further evaluation of headaches.     Her headache presentation meets criteria for chronic migraine without aura, possible bilateral occipital neuralgia.    Her neurologic examination is intact. Agree with obtaining MRI brain with and without contrast given refractory nature of her headaches.     We discussed the following treatment strategy:  - For acute treatment of mild headache, she may use acetaminophen as needed, not to exceed 14 days per month to avoid medication overuse.   - For acute treatment of moderate to severe headache, consider alternative triptans such as eletriptan, zolmitriptan. Alternatively, can consider metoclopramide or prochlorperazine, and ketorolac.     Her current frequency and severity of headaches warrant prevention.  - She is currently taking nortriptyline, topiramate, Nurtec every other day with poor headache control. She will be starting propranolol per Dr. Vega's recommendation.  - She would be a candidate for botulinum toxin injections for chronic migraine prevention. Reviewed general procedure, risks, benefits, trial time of 3 rounds and she is interested in this. We will work to obtain prior authorization and she will be scheduled for a Botox appointment.     She will continue with additional headache medication management with Dr. Vega.       The longitudinal plan of care for the  diagnosis(es)/condition(s) as documented were addressed during this visit. Due to the added complexity in care, I will continue to support Deni in the subsequent management and with ongoing continuity of care.     I spent 35 minutes today on patient care, chart review, and documentation.    Martha Kwon PA-C  Headache Neurology  [facility] Neu[facility]            Chief Complaint:     Chief Complaint   Patient presents with     Headache     headache referral for nataliia Vega           History is obtained from the patient and medical record.      Deni Simental is a 44 year old female referred by Dr. Vega who presents for further evaluation of headaches.     She has been living with migraine headaches since she was a child. She recalls having a migraine in first grade. Headaches have worsened over the past year.     Her headaches are typically right-sided, occasionally can be left-sided. Headaches can become very sharp when severe. Often, headache will focus around her eye but it can radiate throughout one side. She can also feel pain throughout her face or jaw, and down into her neck. She can have sharp pain which radiates from the back of her neck up towards her eye.   She rates her typical headaches as a 5-6/10 and severe headaches can reach a 10/10.   On average, headaches last 4-5 days, but she has had headaches last for 3 weeks.     She will have more blurred vision with headache but denies classic visual aura. She will have photophobia, phonophobia, osmophobia. She will have nausea and vomiting. She can occasionally have dizziness, bilateral tingling of her face and hands. Denies unilateral autonomic features, pulsatile tinnitus, positional or exertional component.    She currently reports 30/30 headache days per month with 30/30 severe headache days per month.     She is currently taking nortriptyline, topiramate, Nurtec, magnesium, riboflavin. Currently only taking  acetaminophen for acute treatment as Ubrelvy was not effective.     She was seen by Dr. Vega yesterday and he had recommended a trial of propranolol 60 mg daily and prednisone taper. Pharmacy just filled these today.     Headache triggers include heat, humidity, barometric pressure changes.     She clenches her teeth, wears a . Has chronic neck tension.    Sleep is poor, has difficulty falling asleep and staying asleep.     She drinks Diet Coke 2-3 cans daily.     She denies history of significant head injuries.     She is not aware of maternal extended family history, but no known family headache history.     She is up to date on eye exams.     She has noted more association with migraine and menstrual cycle recently. Currently on OCP to help with this. This is minimally helpful.     Current headache treatments:  Acute therapies:  - Acetaminophen    Preventative therapies:  - Nortriptyline 75 mg nightly    - Topiramate 100 mg AM/200 mg PM  - Nurtec 75 mg every other day  - Propranolol 60 mg daily   - OCP  - Magnesium  - Riboflavin     Supportive therapies:    Previous treatments tried:  Acute therapies:  - Sumatriptan - not effective   - Rizatriptan - not effective   - Frovatriptan - not effective, costly  - Ubrelvy 100 mg - not effective   - Nurtec - not effective     Preventative therapies:  - Emgality - not effective, triggered menses     Supportive therapies:        11/10/2024     3:31 PM   HIT-6   When you have headaches, how often is the pain severe 11    How often do headaches limit your ability to do usual daily activities including household work, work, school, or social activities? 11    When you have a headache, how often do you wish you could lie down? 13    In the past 4 weeks, how often have you felt too tired to do work or daily activities because of your headaches 13    In the past 4 weeks, how often have you felt fed up or irritated because of your headaches 11    In the past 4  weeks, how often did headaches limit your ability to concentrate on work or daily activities 11    HIT-6 Total Score 70        Patient-reported           11/10/2024     3:47 PM   MIDAS - in the past three months:   On how many days did you miss work or school because of your headaches? 9   How many days was your productivity at work or school reduced by half or more because of your headaches? 0   On how many days did you not do household work because of your headaches? 75   How many days was your productivity in household work reduced by half or more because of your headaches? 10   On how many days did you miss family, social, or leisure activities because of your headaches? 20   On how many days did you have a headache? 80   On a scale of 0-10, on average how painful were these headaches? 6   MIDAS Score 114 (IV - Severe Disability)                Past Medical History:     Past Medical History:   Diagnosis Date     Allergy     multiple     Ankle sprain 4/10     Arthritis      Depression     in teens -      Hashimoto's thyroiditis      IUD (intrauterine device) in place     mirena 05     Uncomplicated asthma               Past Surgical History:     Past Surgical History:   Procedure Laterality Date     HC TOOTH EXTRACTION W/FORCEP               Social History:     She currently works full time as a .    Social History     Socioeconomic History     Marital status:      Spouse name: Marcello     Number of children: 2     Years of education: Not on file     Highest education level: Not on file   Occupational History     Not on file   Tobacco Use     Smoking status: Former     Current packs/day: 0.00     Types: Cigarettes     Quit date: 2011     Years since quittin.1     Smokeless tobacco: Never     Tobacco comments:     Lives in smoke free household   Vaping Use     Vaping status: Never Used   Substance and Sexual Activity     Alcohol use: No     Drug use: No     Sexual activity: Yes      Partners: Male     Birth control/protection: Condom   Other Topics Concern     Parent/sibling w/ CABG, MI or angioplasty before 65F 55M? No   Social History Narrative     Not on file     Social Drivers of Health     Financial Resource Strain: Low Risk  (8/25/2024)    Financial Resource Strain      Within the past 12 months, have you or your family members you live with been unable to get utilities (heat, electricity) when it was really needed?: No   Food Insecurity: Low Risk  (8/25/2024)    Food Insecurity      Within the past 12 months, did you worry that your food would run out before you got money to buy more?: No      Within the past 12 months, did the food you bought just not last and you didn t have money to get more?: No   Transportation Needs: Low Risk  (8/25/2024)    Transportation Needs      Within the past 12 months, has lack of transportation kept you from medical appointments, getting your medicines, non-medical meetings or appointments, work, or from getting things that you need?: No   Physical Activity: Insufficiently Active (8/25/2024)    Exercise Vital Sign      Days of Exercise per Week: 2 days      Minutes of Exercise per Session: 20 min   Stress: No Stress Concern Present (8/25/2024)    [employer] [place] [employer] - Occupational Stress Questionnaire      Feeling of Stress : Not at all   Social Connections: Unknown (8/25/2024)    Social Connection and Isolation Panel [NHANES]      Frequency of Communication with Friends and Family: Not on file      Frequency of Social Gatherings with Friends and Family: Patient declined      Attends Shinto Services: Not on file      Active Member of Clubs or Organizations: Not on file      Attends Club or Organization Meetings: Not on file      Marital Status: Not on file   Interpersonal Safety: Low Risk  (8/28/2024)    Interpersonal Safety      Do you feel physically and emotionally safe where you currently live?: Yes      Within the past 12  months, have you been hit, slapped, kicked or otherwise physically hurt by someone?: No      Within the past 12 months, have you been humiliated or emotionally abused in other ways by your partner or ex-partner?: No   Housing Stability: High Risk (2024)    Housing Stability      Do you have housing? : No      Are you worried about losing your housing?: No             Family History:     Family History   Problem Relation Age of Onset     Alcohol/Drug Mother          MVA alcohol related     Diabetes Father         hypoglycemia     Hypertension Father      Psoriatic Arthritis Father      Psoriasis Father      Graves' disease Sister         Graves, tumor     Cerebrovascular Disease Maternal Grandmother      Hypothyroidism Paternal Grandmother      Cancer Paternal Grandfather         lung cancer     C.A.D. No family hx of      Depression No family hx of      Gastrointestinal Disease No family hx of      Heart Disease No family hx of      Lipids No family hx of      Neurologic Disorder No family hx of      Respiratory No family hx of      Glaucoma No family hx of      Macular Degeneration No family hx of              Allergies:      Allergies   Allergen Reactions     Wasp Venom Hives     Swelling past two joints     [place] Flavor Hives     Red Dye #40 (Allura Red) Hives     Retin-A [Tretinoin] Hives             Medications:     Current Outpatient Medications:      albuterol (PROAIR HFA/PROVENTIL HFA/VENTOLIN HFA) 108 (90 Base) MCG/ACT inhaler, Inhale 1-2 puffs into the lungs every 4 hours as needed for shortness of breath, wheezing or cough., Disp: 18 g, Rfl: 3     albuterol (PROVENTIL) (2.5 MG/3ML) 0.083% neb solution, Inhale 2.5 mg into the lungs as needed, Disp: , Rfl:      guanFACINE (INTUNIV) 2 MG TB24 24 hr tablet, TAKE 1 TABLET BY MOUTH AT BEDTIME, Disp: 30 tablet, Rfl: 0     levonorgestrel-ethinyl estradiol (NORDETTE) 0.15-30 MG-MCG tablet, Take 1 tablet by mouth daily., Disp: 112 tablet, Rfl: 1      "nabumetone (RELAFEN) 750 MG tablet, TAKE 1 TABLET BY MOUTH TWICE A DAY, Disp: 180 tablet, Rfl: 0     nortriptyline (PAMELOR) 75 MG capsule, TAKE 1 CAPSULE BY MOUTH AT BEDTIME, Disp: 90 capsule, Rfl: 1     NURTEC 75 MG ODT tablet, PLACE 1 TABLET (75 MG) UNDER THE TONGUE EVERY 48 HOURS, Disp: , Rfl:      predniSONE (DELTASONE) 50 MG tablet, Take 1 tablet (50 mg) by mouth daily., Disp: 5 tablet, Rfl: 0     propranolol ER (INDERAL LA) 60 MG 24 hr capsule, Take 1 capsule (60 mg) by mouth daily., Disp: 90 capsule, Rfl: 1     topiramate (TOPAMAX) 100 MG tablet, 100 mg qam 200 mg at bedtime, Disp: 270 tablet, Rfl: 1          Physical Exam:   /88   Pulse 103   Ht 1.676 m (5' 6\")   Wt 97.5 kg (215 lb)   LMP 10/30/2024 (Exact Date)   SpO2 98%   BMI 34.70 kg/m       General: In no acute distress.  Head: Normocephalic, atraumatic. Pain with palpation over right > left supraorbital notches, bilateral occipital nerves. Temporal pulses intact.   Neck: Normal range of motion with lateral head movements and neck flexion.  Eyes: No conjunctival injection, no scleral icterus.     Neurologic Exam:  Mental Status Exam: Alert, awake and oriented to situation. No dysarthria. Speech of normal fluency.  Cranial Nerves: Fundoscopic exam with clear disc margins bilaterally. PERRLA, EOMs intact, no nystagmus, facial movements symmetric, facial sensation intact to light touch, hearing intact to conversation, trapezius and SCMs 5/5 bilaterally, tongue midline and fully mobile. No tongue atrophy or fasciculations.   Motor: Normal tone in all four extremities, no atrophy or fasciculations. 5/5 strength bilaterally in shoulder abduction, elbow flexion and extension, wrist flexion and extension, hip flexion, knee flexion and extension, dorsiflexion and plantarflexion. No tremors or abnormal movements noted.  Sensory: Sensation intact to light touch on arms and legs bilaterally.   Coordination: Finger-nose-finger intact bilaterally. " Rapidly alternating movements intact bilaterally in the upper extremities. Normal finger tapping bilaterally. Normal Romberg.  Reflexes: 1+ and symmetric in triceps, biceps, brachioradialis, patellar, and Achilles. Plantar reflexes are downgoing bilaterally.  Gait: Normal gait. Able to toe and heel walk. Normal tandem gait.            Data:     MRI brain w/wo - ordered per Dr. Vega yesterday, scheduled for 12/6/2024         Again, thank you for allowing me to participate in the care of your patient.        Sincerely,        RIA GRIFFITH PA-C

## 2024-11-12 NOTE — PROGRESS NOTES
[facility]   Headache Neurology Consult    November 12, 2024     Deni Simental MRN# [id number]   YOB: 1980 Age: 44 year old     Referring provider: Lev Vega          Assessment and Recommendations:     Deni Simental is a 44 year old female who presents for further evaluation of headaches.     Her headache presentation meets criteria for chronic migraine without aura, possible bilateral occipital neuralgia.    Her neurologic examination is intact. Agree with obtaining MRI brain with and without contrast given refractory nature of her headaches.     We discussed the following treatment strategy:  - For acute treatment of mild headache, she may use acetaminophen as needed, not to exceed 14 days per month to avoid medication overuse.   - For acute treatment of moderate to severe headache, consider alternative triptans such as eletriptan, zolmitriptan. Alternatively, can consider metoclopramide or prochlorperazine, and ketorolac.     Her current frequency and severity of headaches warrant prevention.  - She is currently taking nortriptyline, topiramate, Nurtec every other day with poor headache control. She will be starting propranolol per Dr. Vega's recommendation.  - She would be a candidate for botulinum toxin injections for chronic migraine prevention. Reviewed general procedure, risks, benefits, trial time of 3 rounds and she is interested in this. We will work to obtain prior authorization and she will be scheduled for a Botox appointment.     She will continue with additional headache medication management with Dr. Vega.       The longitudinal plan of care for the diagnosis(es)/condition(s) as documented were addressed during this visit. Due to the added complexity in care, I will continue to support Deni in the subsequent management and with ongoing continuity of care.     I spent 35 minutes today on patient care, chart review, and  documentation.    Martha Kwon PA-C  Headache Neurology  [facility]            Chief Complaint:     Chief Complaint   Patient presents with    Headache     headache referral for nataliia Vega           History is obtained from the patient and medical record.      Deni Simental is a 44 year old female referred by Dr. Vega who presents for further evaluation of headaches.     She has been living with migraine headaches since she was a child. She recalls having a migraine in first grade. Headaches have worsened over the past year.     Her headaches are typically right-sided, occasionally can be left-sided. Headaches can become very sharp when severe. Often, headache will focus around her eye but it can radiate throughout one side. She can also feel pain throughout her face or jaw, and down into her neck. She can have sharp pain which radiates from the back of her neck up towards her eye.   She rates her typical headaches as a 5-6/10 and severe headaches can reach a 10/10.   On average, headaches last 4-5 days, but she has had headaches last for 3 weeks.     She will have more blurred vision with headache but denies classic visual aura. She will have photophobia, phonophobia, osmophobia. She will have nausea and vomiting. She can occasionally have dizziness, bilateral tingling of her face and hands. Denies unilateral autonomic features, pulsatile tinnitus, positional or exertional component.    She currently reports 30/30 headache days per month with 30/30 severe headache days per month.     She is currently taking nortriptyline, topiramate, Nurtec, magnesium, riboflavin. Currently only taking acetaminophen for acute treatment as Ubrelvy was not effective.     She was seen by Dr. Vega yesterday and he had recommended a trial of propranolol 60 mg daily and prednisone taper. Pharmacy just filled these today.     Headache triggers include heat, humidity, barometric pressure  changes.     She clenches her teeth, wears a . Has chronic neck tension.    Sleep is poor, has difficulty falling asleep and staying asleep.     She drinks Diet Coke 2-3 cans daily.     She denies history of significant head injuries.     She is not aware of maternal extended family history, but no known family headache history.     She is up to date on eye exams.     She has noted more association with migraine and menstrual cycle recently. Currently on OCP to help with this. This is minimally helpful.     Current headache treatments:  Acute therapies:  - Acetaminophen    Preventative therapies:  - Nortriptyline 75 mg nightly    - Topiramate 100 mg AM/200 mg PM  - Nurtec 75 mg every other day  - Propranolol 60 mg daily   - OCP  - Magnesium  - Riboflavin     Supportive therapies:    Previous treatments tried:  Acute therapies:  - Sumatriptan - not effective   - Rizatriptan - not effective   - Frovatriptan - not effective, costly  - Ubrelvy 100 mg - not effective   - Nurtec - not effective     Preventative therapies:  - Emgality - not effective, triggered menses     Supportive therapies:        11/10/2024     3:31 PM   HIT-6   When you have headaches, how often is the pain severe 11    How often do headaches limit your ability to do usual daily activities including household work, work, school, or social activities? 11    When you have a headache, how often do you wish you could lie down? 13    In the past 4 weeks, how often have you felt too tired to do work or daily activities because of your headaches 13    In the past 4 weeks, how often have you felt fed up or irritated because of your headaches 11    In the past 4 weeks, how often did headaches limit your ability to concentrate on work or daily activities 11    HIT-6 Total Score 70        Patient-reported           11/10/2024     3:47 PM   MIDAS - in the past three months:   On how many days did you miss work or school because of your headaches? 9    How many days was your productivity at work or school reduced by half or more because of your headaches? 0   On how many days did you not do household work because of your headaches? 75   How many days was your productivity in household work reduced by half or more because of your headaches? 10   On how many days did you miss family, social, or leisure activities because of your headaches? 20   On how many days did you have a headache? 80   On a scale of 0-10, on average how painful were these headaches? 6   MIDAS Score 114 (IV - Severe Disability)                Past Medical History:     Past Medical History:   Diagnosis Date    Allergy     multiple    Ankle sprain 4/10    Arthritis     Depression     in teens -     Hashimoto's thyroiditis     IUD (intrauterine device) in place     mirena 05    Uncomplicated asthma               Past Surgical History:     Past Surgical History:   Procedure Laterality Date    HC TOOTH EXTRACTION W/FORCEP               Social History:     She currently works full time as a .    Social History     Socioeconomic History    Marital status:      Spouse name: Marcello    Number of children: 2    Years of education: Not on file    Highest education level: Not on file   Occupational History    Not on file   Tobacco Use    Smoking status: Former     Current packs/day: 0.00     Types: Cigarettes     Quit date: 2011     Years since quittin.1    Smokeless tobacco: Never    Tobacco comments:     Lives in smoke free household   Vaping Use    Vaping status: Never Used   Substance and Sexual Activity    Alcohol use: No    Drug use: No    Sexual activity: Yes     Partners: Male     Birth control/protection: Condom   Other Topics Concern    Parent/sibling w/ CABG, MI or angioplasty before 65F 55M? No   Social History Narrative    Not on file     Social Drivers of Health     Financial Resource Strain: Low Risk  (2024)    Financial Resource Strain     Within the  past 12 months, have you or your family members you live with been unable to get utilities (heat, electricity) when it was really needed?: No   Food Insecurity: Low Risk  (8/25/2024)    Food Insecurity     Within the past 12 months, did you worry that your food would run out before you got money to buy more?: No     Within the past 12 months, did the food you bought just not last and you didn t have money to get more?: No   Transportation Needs: Low Risk  (8/25/2024)    Transportation Needs     Within the past 12 months, has lack of transportation kept you from medical appointments, getting your medicines, non-medical meetings or appointments, work, or from getting things that you need?: No   Physical Activity: Insufficiently Active (8/25/2024)    Exercise Vital Sign     Days of Exercise per Week: 2 days     Minutes of Exercise per Session: 20 min   Stress: No Stress Concern Present (8/25/2024)    [employer] - Occupational Stress Questionnaire     Feeling of Stress : Not at all   Social Connections: Unknown (8/25/2024)    Social Connection and Isolation Panel [NHANES]     Frequency of Communication with Friends and Family: Not on file     Frequency of Social Gatherings with Friends and Family: Patient declined     Attends Denominational Services: Not on file     Active Member of Clubs or Organizations: Not on file     Attends Club or Organization Meetings: Not on file     Marital Status: Not on file   Interpersonal Safety: Low Risk  (8/28/2024)    Interpersonal Safety     Do you feel physically and emotionally safe where you currently live?: Yes     Within the past 12 months, have you been hit, slapped, kicked or otherwise physically hurt by someone?: No     Within the past 12 months, have you been humiliated or emotionally abused in other ways by your partner or ex-partner?: No   Housing Stability: High Risk (8/25/2024)    Housing Stability     Do you have housing? : No     Are you worried  about losing your housing?: No             Family History:     Family History   Problem Relation Age of Onset    Alcohol/Drug Mother          MVA alcohol related    Diabetes Father         hypoglycemia    Hypertension Father     Psoriatic Arthritis Father     Psoriasis Father     Graves' disease Sister         Graves, tumor    Cerebrovascular Disease Maternal Grandmother     Hypothyroidism Paternal Grandmother     Cancer Paternal Grandfather         lung cancer    C.A.D. No family hx of     Depression No family hx of     Gastrointestinal Disease No family hx of     Heart Disease No family hx of     Lipids No family hx of     Neurologic Disorder No family hx of     Respiratory No family hx of     Glaucoma No family hx of     Macular Degeneration No family hx of              Allergies:      Allergies   Allergen Reactions    Wasp Venom Hives     Swelling past two joints    [place] Flavor Hives    Red Dye #40 (Allura Red) Hives    Retin-A [Tretinoin] Hives             Medications:     Current Outpatient Medications:     albuterol (PROAIR HFA/PROVENTIL HFA/VENTOLIN HFA) 108 (90 Base) MCG/ACT inhaler, Inhale 1-2 puffs into the lungs every 4 hours as needed for shortness of breath, wheezing or cough., Disp: 18 g, Rfl: 3    albuterol (PROVENTIL) (2.5 MG/3ML) 0.083% neb solution, Inhale 2.5 mg into the lungs as needed, Disp: , Rfl:     guanFACINE (INTUNIV) 2 MG TB24 24 hr tablet, TAKE 1 TABLET BY MOUTH AT BEDTIME, Disp: 30 tablet, Rfl: 0    levonorgestrel-ethinyl estradiol (NORDETTE) 0.15-30 MG-MCG tablet, Take 1 tablet by mouth daily., Disp: 112 tablet, Rfl: 1    nabumetone (RELAFEN) 750 MG tablet, TAKE 1 TABLET BY MOUTH TWICE A DAY, Disp: 180 tablet, Rfl: 0    nortriptyline (PAMELOR) 75 MG capsule, TAKE 1 CAPSULE BY MOUTH AT BEDTIME, Disp: 90 capsule, Rfl: 1    NURTEC 75 MG ODT tablet, PLACE 1 TABLET (75 MG) UNDER THE TONGUE EVERY 48 HOURS, Disp: , Rfl:     predniSONE (DELTASONE) 50 MG tablet, Take 1 tablet (50 mg) by  "mouth daily., Disp: 5 tablet, Rfl: 0    propranolol ER (INDERAL LA) 60 MG 24 hr capsule, Take 1 capsule (60 mg) by mouth daily., Disp: 90 capsule, Rfl: 1    topiramate (TOPAMAX) 100 MG tablet, 100 mg qam 200 mg at bedtime, Disp: 270 tablet, Rfl: 1          Physical Exam:   /88   Pulse 103   Ht 1.676 m (5' 6\")   Wt 97.5 kg (215 lb)   LMP 10/30/2024 (Exact Date)   SpO2 98%   BMI 34.70 kg/m       General: In no acute distress.  Head: Normocephalic, atraumatic. Pain with palpation over right > left supraorbital notches, bilateral occipital nerves. Temporal pulses intact.   Neck: Normal range of motion with lateral head movements and neck flexion.  Eyes: No conjunctival injection, no scleral icterus.     Neurologic Exam:  Mental Status Exam: Alert, awake and oriented to situation. No dysarthria. Speech of normal fluency.  Cranial Nerves: Fundoscopic exam with clear disc margins bilaterally. PERRLA, EOMs intact, no nystagmus, facial movements symmetric, facial sensation intact to light touch, hearing intact to conversation, trapezius and SCMs 5/5 bilaterally, tongue midline and fully mobile. No tongue atrophy or fasciculations.   Motor: Normal tone in all four extremities, no atrophy or fasciculations. 5/5 strength bilaterally in shoulder abduction, elbow flexion and extension, wrist flexion and extension, hip flexion, knee flexion and extension, dorsiflexion and plantarflexion. No tremors or abnormal movements noted.  Sensory: Sensation intact to light touch on arms and legs bilaterally.   Coordination: Finger-nose-finger intact bilaterally. Rapidly alternating movements intact bilaterally in the upper extremities. Normal finger tapping bilaterally. Normal Romberg.  Reflexes: 1+ and symmetric in triceps, biceps, brachioradialis, patellar, and Achilles. Plantar reflexes are downgoing bilaterally.  Gait: Normal gait. Able to toe and heel walk. Normal tandem gait.            Data:     MRI brain w/wo - ordered " per Dr. Vega yesterday, scheduled for 12/6/2024

## 2024-11-12 NOTE — NURSING NOTE
"Deni Simental is a 44 year old female who presents for:  Chief Complaint   Patient presents with    Headache     headache referral for botox Ref Arminison        Initial Vitals:  Pulse 112   Ht 1.676 m (5' 6\")   Wt 97.5 kg (215 lb)   LMP 10/30/2024 (Exact Date)   SpO2 98%   BMI 34.70 kg/m   Estimated body mass index is 34.7 kg/m  as calculated from the following:    Height as of this encounter: 1.676 m (5' 6\").    Weight as of this encounter: 97.5 kg (215 lb).. Body surface area is 2.13 meters squared. BP completed using cuff size: anne Brandt CMA    "

## 2024-11-18 DIAGNOSIS — F90.9 ADULT ADHD: ICD-10-CM

## 2024-11-18 RX ORDER — GUANFACINE 2 MG/1
2 TABLET, EXTENDED RELEASE ORAL AT BEDTIME
Qty: 90 TABLET | Refills: 0 | Status: SHIPPED | OUTPATIENT
Start: 2024-11-18

## 2024-11-18 NOTE — TELEPHONE ENCOUNTER
90 Day Prescription Request    guanFACINE (INTUNIV) 2 MG TB24 24 hr tablet     Pharmacy is requesting 90 day supply for the above med. Please let them know if this is authorized and total number of refills allowed.    Fransisco RETANA May on 11/18/2024 at 3:42 PM

## 2024-11-20 ENCOUNTER — OFFICE VISIT (OUTPATIENT)
Dept: PODIATRY | Facility: CLINIC | Age: 44
End: 2024-11-20
Attending: FAMILY MEDICINE
Payer: COMMERCIAL

## 2024-11-20 VITALS
BODY MASS INDEX: 35.36 KG/M2 | DIASTOLIC BLOOD PRESSURE: 64 MMHG | HEIGHT: 66 IN | SYSTOLIC BLOOD PRESSURE: 110 MMHG | WEIGHT: 220 LBS

## 2024-11-20 DIAGNOSIS — M76.822 POSTERIOR TIBIALIS TENDINITIS OF BOTH LOWER EXTREMITIES: Primary | ICD-10-CM

## 2024-11-20 DIAGNOSIS — M76.821 POSTERIOR TIBIALIS TENDINITIS OF BOTH LOWER EXTREMITIES: Primary | ICD-10-CM

## 2024-11-20 DIAGNOSIS — M72.2 PLANTAR FASCIITIS, BILATERAL: ICD-10-CM

## 2024-11-20 PROCEDURE — 99203 OFFICE O/P NEW LOW 30 MIN: CPT | Performed by: PODIATRIST

## 2024-11-20 ASSESSMENT — PAIN SCALES - GENERAL: PAINLEVEL_OUTOF10: MILD PAIN (2)

## 2024-11-20 NOTE — PROGRESS NOTES
HPI:  Deni Simental is a 44 year old female who is seen in consultation at the request of Radha Lay MD    Pt presents for eval of:   (Onset, Location, L/R, Character, Treatments, Injury if yes)    XR Left and Right foot and ankle 11/4/2024     Onset 10/20/2024, medial Right > Left ankle pain and swelling while making lasagna. Presents with athletic shoes.  Constant swelling, dull ache. Sharp and stabbing pain 2-9/10 with activity.  No known injury or change in activity or work.    NSAIDS did not help  No change with compression socks  Last ankle sprain more than 15 years ago.   Uses 50 mg of prednisone for 5 days total and has not changed calf pain     Works as a , driving, walking, standing. All driving or mounted route.     ROS:  10 point ROS neg other than the symptoms noted above in the HPI.    Patient Active Problem List   Diagnosis    CARDIOVASCULAR SCREENING; LDL GOAL LESS THAN 160    Migraine with aura and without status migrainosus, not intractable    Hashimoto's thyroiditis    Thyroglobulin antibody positive    Alopecia    Fatigue, unspecified type    Poor sleep hygiene    Thyroid nodule    Adult ADHD       PAST MEDICAL HISTORY:   Past Medical History:   Diagnosis Date    Allergy     multiple    Ankle sprain 4/10    Arthritis     Depression     in teens -     Hashimoto's thyroiditis     IUD (intrauterine device) in place     mirena 6/20/05    Uncomplicated asthma         PAST SURGICAL HISTORY:   Past Surgical History:   Procedure Laterality Date    HC TOOTH EXTRACTION W/FORCEP          MEDICATIONS:   Current Outpatient Medications:     guanFACINE (INTUNIV) 2 MG TB24 24 hr tablet, Take 1 tablet (2 mg) by mouth at bedtime., Disp: 90 tablet, Rfl: 0    levonorgestrel-ethinyl estradiol (NORDETTE) 0.15-30 MG-MCG tablet, Take 1 tablet by mouth daily., Disp: 112 tablet, Rfl: 1    nabumetone (RELAFEN) 750 MG tablet, TAKE 1 TABLET BY MOUTH TWICE A DAY, Disp: 180 tablet, Rfl: 0     nortriptyline (PAMELOR) 75 MG capsule, TAKE 1 CAPSULE BY MOUTH AT BEDTIME, Disp: 90 capsule, Rfl: 1    NURTEC 75 MG ODT tablet, PLACE 1 TABLET (75 MG) UNDER THE TONGUE EVERY 48 HOURS, Disp: , Rfl:     predniSONE (DELTASONE) 50 MG tablet, Take 1 tablet (50 mg) by mouth daily., Disp: 5 tablet, Rfl: 0    propranolol ER (INDERAL LA) 60 MG 24 hr capsule, Take 1 capsule (60 mg) by mouth daily., Disp: 90 capsule, Rfl: 1    topiramate (TOPAMAX) 100 MG tablet, 100 mg qam 200 mg at bedtime, Disp: 270 tablet, Rfl: 1    albuterol (PROAIR HFA/PROVENTIL HFA/VENTOLIN HFA) 108 (90 Base) MCG/ACT inhaler, Inhale 1-2 puffs into the lungs every 4 hours as needed for shortness of breath, wheezing or cough. (Patient not taking: Reported on 2024), Disp: 18 g, Rfl: 3    albuterol (PROVENTIL) (2.5 MG/3ML) 0.083% neb solution, Inhale 2.5 mg into the lungs as needed (Patient not taking: Reported on 2024), Disp: , Rfl:     Current Facility-Administered Medications:     Botulinum Toxin Type A (BOTOX) 200 units injection 150 Units, 150 Units, Intramuscular, See Admin Instructions,      ALLERGIES:    Allergies   Allergen Reactions    Wasp Venom Hives     Swelling past two joints    [place] Flavor Hives    Red Dye #40 (Allura Red) Hives    Retin-A [Tretinoin] Hives        SOCIAL HISTORY:   Social History     Socioeconomic History    Marital status:      Spouse name: Marcello    Number of children: 2    Years of education: Not on file    Highest education level: Not on file   Occupational History    Not on file   Tobacco Use    Smoking status: Former     Current packs/day: 0.00     Types: Cigarettes     Quit date: 2011     Years since quittin.1    Smokeless tobacco: Never    Tobacco comments:     Lives in smoke free household   Vaping Use    Vaping status: Never Used   Substance and Sexual Activity    Alcohol use: No    Drug use: No    Sexual activity: Yes     Partners: Male     Birth control/protection: Condom   Other  Topics Concern    Parent/sibling w/ CABG, MI or angioplasty before 65F 55M? No   Social History Narrative    Not on file     Social Drivers of Health     Financial Resource Strain: Low Risk  (8/25/2024)    Financial Resource Strain     Within the past 12 months, have you or your family members you live with been unable to get utilities (heat, electricity) when it was really needed?: No   Food Insecurity: Low Risk  (8/25/2024)    Food Insecurity     Within the past 12 months, did you worry that your food would run out before you got money to buy more?: No     Within the past 12 months, did the food you bought just not last and you didn t have money to get more?: No   Transportation Needs: Low Risk  (8/25/2024)    Transportation Needs     Within the past 12 months, has lack of transportation kept you from medical appointments, getting your medicines, non-medical meetings or appointments, work, or from getting things that you need?: No   Physical Activity: Insufficiently Active (8/25/2024)    Exercise Vital Sign     Days of Exercise per Week: 2 days     Minutes of Exercise per Session: 20 min   Stress: No Stress Concern Present (8/25/2024)    [employer] [place] [employer] - Occupational Stress Questionnaire     Feeling of Stress : Not at all   Social Connections: Unknown (8/25/2024)    Social Connection and Isolation Panel [NHANES]     Frequency of Communication with Friends and Family: Not on file     Frequency of Social Gatherings with Friends and Family: Patient declined     Attends Congregation Services: Not on file     Active Member of Clubs or Organizations: Not on file     Attends Club or Organization Meetings: Not on file     Marital Status: Not on file   Interpersonal Safety: Low Risk  (8/28/2024)    Interpersonal Safety     Do you feel physically and emotionally safe where you currently live?: Yes     Within the past 12 months, have you been hit, slapped, kicked or otherwise physically hurt by  "someone?: No     Within the past 12 months, have you been humiliated or emotionally abused in other ways by your partner or ex-partner?: No   Housing Stability: High Risk (2024)    Housing Stability     Do you have housing? : No     Are you worried about losing your housing?: No        FAMILY HISTORY:   Family History   Problem Relation Age of Onset    Alcohol/Drug Mother          MVA alcohol related    Diabetes Father         hypoglycemia    Hypertension Father     Psoriatic Arthritis Father     Psoriasis Father     Graves' disease Sister         Graves, tumor    Cerebrovascular Disease Maternal Grandmother     Hypothyroidism Paternal Grandmother     Cancer Paternal Grandfather         lung cancer    C.A.D. No family hx of     Depression No family hx of     Gastrointestinal Disease No family hx of     Heart Disease No family hx of     Lipids No family hx of     Neurologic Disorder No family hx of     Respiratory No family hx of     Glaucoma No family hx of     Macular Degeneration No family hx of         EXAM:Vitals: /64 (BP Location: Left arm, Patient Position: Sitting, Cuff Size: Adult Regular)   Ht 1.676 m (5' 6\")   Wt 99.8 kg (220 lb)   LMP 10/30/2024 (Exact Date)   BMI 35.51 kg/m    BMI= Body mass index is 35.51 kg/m .    General appearance: Patient is alert and fully cooperative with history & exam.  No sign of distress is noted during the visit.     Psychiatric: Affect is pleasant & appropriate.  Patient appears motivated to improve health.     Respiratory: Breathing is regular & unlabored while sitting.     HEENT: Hearing is intact to spoken word.  Speech is clear.  No gross evidence of visual impairment that would impact ambulation.     Vascular: DP & PT pulses are intact & regular bilaterally.  No significant edema or varicosities noted.  CFT and skin temperature is normal to both lower extremities.     Neurologic: Lower extremity sensation is intact to light touch.  No evidence of " weakness or contracture in the lower extremities.  No evidence of neuropathy.    Dermatologic: Skin is intact to both lower extremities with adequate texture, turgor and tone about the integument.  No paronychia or evidence of soft tissue infection is noted.     Musculoskeletal: Patient is ambulatory without assistive device or brace.  Pinpoint area of discomfort is along the course of the posterior tibial tendon bilateral right more than left.  No palpable edema is noted.  Minimal discomfort about the calf or Achilles or peroneal tendons.  No painful or limited range of motion through the ankle subtalar midtarsal joint however generalized pes valgus is noted.  She also has discomfort with firm palpation along the medial band of the plantar fascia bilateral right greater than left.    Radiographs: 3 views bilateral 11/20/2024 demonstrate insertional osteophyte at the plantar fascia insertion origination upon the calcaneus bilateral.  No fracture noted.     ASSESSMENT:       ICD-10-CM    1. Posterior tibialis tendinitis of both lower extremities  M76.821 Orthotics, Mastectomy and Custom Compression Orders    M76.822       2. Plantar fasciitis, bilateral  M72.2 Orthotics, Mastectomy and Custom Compression Orders           PLAN:  Reviewed patient[facility] chart in [facility].      11/20/2024   Interpreted radiographs   placed order for custom molded orthotics to provide arch support and reduce recruitment of the posterior tibial tendon and plantar fascia  Recommend appropriate shoe gear instead of her minimalist flexible shoes as they do not provide adequate support for her physical activity and size as a    offered NSAIDs but she refuses that at this time..    Patient utilized orthotics 15 years ago   discussed alternative treatment options all questions answered      Seng Perry DPM

## 2024-11-20 NOTE — PATIENT INSTRUCTIONS
Reliable shoe stores: To maximize your experience and provide the best possible fit.  Be sure to show them your foot concerns and tell them Dr. Perry sent you.      Stores listed in bold have only athletic shoes, and stores that are not bold are mostly casual or variety of shoes    [employer]  [address] [place] [address]  [phone]    [facility]  [address] [place] [address]  [phone]     [employer] [facility]  [address] [place] [address]  [phone]    [employer]  [address] [facility] [address]  [phone]    [employer]  [address] [place], [place] [address] [phone]    [employer]  [address] [place] [address]  [phone]                         [employer] Locations:     [address] [place] [address]   [phone]     [address] [place] [address] [address] [place] [address]   [phone]     [address] [place] [address]   [phone]     [address] [facility] [place] [address]   [phone]     [address] [place] [address]   [phone]     [address] [place] [address]   [phone]     [address] [facility] [address]   [place] [address]   [phone]     [address] [facility]   [place] [address]   [phone]             How to find reasonable shoes          The correct width    Correct Fitting    Correct Length      Foot Distortion    Posture Distortion                          Torsional Rigidity      Grasp behind the heel and underneath the foot and twist      Bad    Excessive torsion/twist in midfoot     Less torsion/twist in midfoot is better                   Heel Counter Rigidity      Grasp just above   midsole and squeeze      Bad    Soft heel counter      Good    Rigid Heel Counter      Flexion Rigidity      Grasp shoe and bend from forefoot to rearfoot

## 2024-11-20 NOTE — LETTER
11/20/2024      Deni Simental  [address] [facility] [address]      Dear Colleague,    Thank you for referring your patient, Deni Simental, to the [facility]. Please see a copy of my visit note below.    HPI:  Deni Simental is a 44 year old female who is seen in consultation at the request of Radha Lay MD    Pt presents for eval of:   (Onset, Location, L/R, Character, Treatments, Injury if yes)    XR Left and Right foot and ankle 11/4/2024     Onset 10/20/2024, medial Right > Left ankle pain and swelling while making lasagna. Presents with athletic shoes.  Constant swelling, dull ache. Sharp and stabbing pain 2-9/10 with activity.  No known injury or change in activity or work.    NSAIDS did not help  No change with compression socks  Last ankle sprain more than 15 years ago.   Uses 50 mg of prednisone for 5 days total and has not changed calf pain     Works as a , driving, walking, standing. All driving or mounted route.     ROS:  10 point ROS neg other than the symptoms noted above in the HPI.    Patient Active Problem List   Diagnosis     CARDIOVASCULAR SCREENING; LDL GOAL LESS THAN 160     Migraine with aura and without status migrainosus, not intractable     Hashimoto's thyroiditis     Thyroglobulin antibody positive     Alopecia     Fatigue, unspecified type     Poor sleep hygiene     Thyroid nodule     Adult ADHD       PAST MEDICAL HISTORY:   Past Medical History:   Diagnosis Date     Allergy     multiple     Ankle sprain 4/10     Arthritis      Depression     in teens -      Hashimoto's thyroiditis      IUD (intrauterine device) in place     mirena 6/20/05     Uncomplicated asthma         PAST SURGICAL HISTORY:   Past Surgical History:   Procedure Laterality Date     HC TOOTH EXTRACTION W/FORCEP          MEDICATIONS:   Current Outpatient Medications:      guanFACINE (INTUNIV) 2 MG TB24 24 hr tablet, Take 1 tablet (2 mg) by  mouth at bedtime., Disp: 90 tablet, Rfl: 0     levonorgestrel-ethinyl estradiol (NORDETTE) 0.15-30 MG-MCG tablet, Take 1 tablet by mouth daily., Disp: 112 tablet, Rfl: 1     nabumetone (RELAFEN) 750 MG tablet, TAKE 1 TABLET BY MOUTH TWICE A DAY, Disp: 180 tablet, Rfl: 0     nortriptyline (PAMELOR) 75 MG capsule, TAKE 1 CAPSULE BY MOUTH AT BEDTIME, Disp: 90 capsule, Rfl: 1     NURTEC 75 MG ODT tablet, PLACE 1 TABLET (75 MG) UNDER THE TONGUE EVERY 48 HOURS, Disp: , Rfl:      predniSONE (DELTASONE) 50 MG tablet, Take 1 tablet (50 mg) by mouth daily., Disp: 5 tablet, Rfl: 0     propranolol ER (INDERAL LA) 60 MG 24 hr capsule, Take 1 capsule (60 mg) by mouth daily., Disp: 90 capsule, Rfl: 1     topiramate (TOPAMAX) 100 MG tablet, 100 mg qam 200 mg at bedtime, Disp: 270 tablet, Rfl: 1     albuterol (PROAIR HFA/PROVENTIL HFA/VENTOLIN HFA) 108 (90 Base) MCG/ACT inhaler, Inhale 1-2 puffs into the lungs every 4 hours as needed for shortness of breath, wheezing or cough. (Patient not taking: Reported on 11/20/2024), Disp: 18 g, Rfl: 3     albuterol (PROVENTIL) (2.5 MG/3ML) 0.083% neb solution, Inhale 2.5 mg into the lungs as needed (Patient not taking: Reported on 11/20/2024), Disp: , Rfl:     Current Facility-Administered Medications:      Botulinum Toxin Type A (BOTOX) 200 units injection 150 Units, 150 Units, Intramuscular, See Admin Instructions,      ALLERGIES:    Allergies   Allergen Reactions     Wasp Venom Hives     Swelling past two joints     [place] Flavor Hives     Red Dye #40 (Allura Red) Hives     Retin-A [Tretinoin] Hives        SOCIAL HISTORY:   Social History     Socioeconomic History     Marital status:      Spouse name: Marcello     Number of children: 2     Years of education: Not on file     Highest education level: Not on file   Occupational History     Not on file   Tobacco Use     Smoking status: Former     Current packs/day: 0.00     Types: Cigarettes     Quit date: 9/17/2011     Years since  quittin.1     Smokeless tobacco: Never     Tobacco comments:     Lives in smoke free household   Vaping Use     Vaping status: Never Used   Substance and Sexual Activity     Alcohol use: No     Drug use: No     Sexual activity: Yes     Partners: Male     Birth control/protection: Condom   Other Topics Concern     Parent/sibling w/ CABG, MI or angioplasty before 65F 55M? No   Social History Narrative     Not on file     Social Drivers of Health     Financial Resource Strain: Low Risk  (2024)    Financial Resource Strain      Within the past 12 months, have you or your family members you live with been unable to get utilities (heat, electricity) when it was really needed?: No   Food Insecurity: Low Risk  (2024)    Food Insecurity      Within the past 12 months, did you worry that your food would run out before you got money to buy more?: No      Within the past 12 months, did the food you bought just not last and you didn t have money to get more?: No   Transportation Needs: Low Risk  (2024)    Transportation Needs      Within the past 12 months, has lack of transportation kept you from medical appointments, getting your medicines, non-medical meetings or appointments, work, or from getting things that you need?: No   Physical Activity: Insufficiently Active (2024)    Exercise Vital Sign      Days of Exercise per Week: 2 days      Minutes of Exercise per Session: 20 min   Stress: No Stress Concern Present (2024)    [employer] [place] [employer] - Occupational Stress Questionnaire      Feeling of Stress : Not at all   Social Connections: Unknown (2024)    Social Connection and Isolation Panel [NHANES]      Frequency of Communication with Friends and Family: Not on file      Frequency of Social Gatherings with Friends and Family: Patient declined      Attends Sikh Services: Not on file      Active Member of Clubs or Organizations: Not on file      Attends Club or  "Organization Meetings: Not on file      Marital Status: Not on file   Interpersonal Safety: Low Risk  (2024)    Interpersonal Safety      Do you feel physically and emotionally safe where you currently live?: Yes      Within the past 12 months, have you been hit, slapped, kicked or otherwise physically hurt by someone?: No      Within the past 12 months, have you been humiliated or emotionally abused in other ways by your partner or ex-partner?: No   Housing Stability: High Risk (2024)    Housing Stability      Do you have housing? : No      Are you worried about losing your housing?: No        FAMILY HISTORY:   Family History   Problem Relation Age of Onset     Alcohol/Drug Mother          MVA alcohol related     Diabetes Father         hypoglycemia     Hypertension Father      Psoriatic Arthritis Father      Psoriasis Father      Graves' disease Sister         Graves, tumor     Cerebrovascular Disease Maternal Grandmother      Hypothyroidism Paternal Grandmother      Cancer Paternal Grandfather         lung cancer     C.A.D. No family hx of      Depression No family hx of      Gastrointestinal Disease No family hx of      Heart Disease No family hx of      Lipids No family hx of      Neurologic Disorder No family hx of      Respiratory No family hx of      Glaucoma No family hx of      Macular Degeneration No family hx of         EXAM:Vitals: /64 (BP Location: Left arm, Patient Position: Sitting, Cuff Size: Adult Regular)   Ht 1.676 m (5' 6\")   Wt 99.8 kg (220 lb)   LMP 10/30/2024 (Exact Date)   BMI 35.51 kg/m    BMI= Body mass index is 35.51 kg/m .    General appearance: Patient is alert and fully cooperative with history & exam.  No sign of distress is noted during the visit.     Psychiatric: Affect is pleasant & appropriate.  Patient appears motivated to improve health.     Respiratory: Breathing is regular & unlabored while sitting.     HEENT: Hearing is intact to spoken word.  Speech " is clear.  No gross evidence of visual impairment that would impact ambulation.     Vascular: DP & PT pulses are intact & regular bilaterally.  No significant edema or varicosities noted.  CFT and skin temperature is normal to both lower extremities.     Neurologic: Lower extremity sensation is intact to light touch.  No evidence of weakness or contracture in the lower extremities.  No evidence of neuropathy.    Dermatologic: Skin is intact to both lower extremities with adequate texture, turgor and tone about the integument.  No paronychia or evidence of soft tissue infection is noted.     Musculoskeletal: Patient is ambulatory without assistive device or brace.  Pinpoint area of discomfort is along the course of the posterior tibial tendon bilateral right more than left.  No palpable edema is noted.  Minimal discomfort about the calf or Achilles or peroneal tendons.  No painful or limited range of motion through the ankle subtalar midtarsal joint however generalized pes valgus is noted.  She also has discomfort with firm palpation along the medial band of the plantar fascia bilateral right greater than left.    Radiographs: 3 views bilateral 11/20/2024 demonstrate insertional osteophyte at the plantar fascia insertion origination upon the calcaneus bilateral.  No fracture noted.     ASSESSMENT:       ICD-10-CM    1. Posterior tibialis tendinitis of both lower extremities  M76.821 Orthotics, Mastectomy and Custom Compression Orders    M76.822       2. Plantar fasciitis, bilateral  M72.2 Orthotics, Mastectomy and Custom Compression Orders           PLAN:  [facility]      11/20/2024   Interpreted radiographs   placed order for custom molded orthotics to provide arch support and reduce recruitment of the posterior tibial tendon and plantar fascia  Recommend appropriate shoe gear instead of her minimalist flexible shoes as they do not provide adequate support for her physical activity and size as  a    offered NSAIDs but she refuses that at this time..    Patient utilized orthotics 15 years ago   discussed alternative treatment options all questions answered      Seng Perry DPM      Again, thank you for allowing me to participate in the care of your patient.        Sincerely,        Seng Perry DPM

## 2024-11-23 ENCOUNTER — OFFICE VISIT (OUTPATIENT)
Dept: URGENT CARE | Facility: URGENT CARE | Age: 44
End: 2024-11-23

## 2024-11-23 VITALS
DIASTOLIC BLOOD PRESSURE: 74 MMHG | TEMPERATURE: 98 F | RESPIRATION RATE: 18 BRPM | OXYGEN SATURATION: 99 % | HEART RATE: 64 BPM | SYSTOLIC BLOOD PRESSURE: 108 MMHG

## 2024-11-23 DIAGNOSIS — M54.50 ACUTE BILATERAL LOW BACK PAIN WITHOUT SCIATICA: Primary | ICD-10-CM

## 2024-11-23 PROCEDURE — 99213 OFFICE O/P EST LOW 20 MIN: CPT | Performed by: PHYSICIAN ASSISTANT

## 2024-11-23 RX ORDER — DICLOFENAC SODIUM 75 MG/1
75 TABLET, DELAYED RELEASE ORAL 2 TIMES DAILY
Qty: 20 TABLET | Refills: 0 | Status: SHIPPED | OUTPATIENT
Start: 2024-11-23

## 2024-11-23 RX ORDER — CYCLOBENZAPRINE HCL 10 MG
10 TABLET ORAL 3 TIMES DAILY PRN
Qty: 30 TABLET | Refills: 0 | Status: SHIPPED | OUTPATIENT
Start: 2024-11-23

## 2024-11-23 NOTE — PROGRESS NOTES
Assessment & Plan     Acute bilateral low back pain without sciatica  - diclofenac (VOLTAREN) 75 MG EC tablet; Take 1 tablet (75 mg) by mouth 2 times daily.  - cyclobenzaprine (FLEXERIL) 10 MG tablet; Take 1 tablet (10 mg) by mouth 3 times daily as needed for muscle spasms.    RICE, ibuprofen. Be seen if worsening.    Return in about 1 week (around 11/30/2024) for visit with primary care provider if not improving.     Kellie Varghese PA-C  [facility]    Subjective   Deni Simental is a 44 year old who presents for the following health issues     Patient presents with:  Back Pain: Was lifting package into her work truck and felt a spasm in her back. Just happened today      HPI    Deni presents clinic today for evaluation of low back pain.  She works for the [employer] and was at work today, lifting parcels from her hamper into her truck.  She was lifting a partial that was light, approximately 5 or 6 pounds, when she felt sudden extreme pain in her back.  She feels pain across her entire low back, right greater than left.  She also feels an achy pain in her right thigh.  She has pain with any movement but also has pain at baseline.  She also feels pain when lifting with her arms.  She describes the pain in her legs as achy and in her low back is sharp.  No loss of bowel or bladder control.  She does note that her legs feel weak, unable to bear her weight.  She does not feel any numbness or tingling in her legs and has good strength of flexion and extension against resistance.    Review of Systems   ROS negative except as stated above.      Objective    /74 (BP Location: Right arm, Patient Position: Sitting, Cuff Size: Adult Large)   Pulse 64   Temp 98  F (36.7  C) (Tympanic)   Resp 18   LMP 10/30/2024 (Exact Date)   SpO2 99%   Physical Exam   GENERAL: alert and appears to be in pain  MS: Sitting in wheel chair, pain with movements  Strength in flexion and extension at feet  5/5, painful bilaterally    No results found for any visits on 11/23/24.

## 2024-11-27 ENCOUNTER — OFFICE VISIT (OUTPATIENT)
Dept: URGENT CARE | Facility: URGENT CARE | Age: 44
End: 2024-11-27

## 2024-11-27 VITALS
DIASTOLIC BLOOD PRESSURE: 84 MMHG | RESPIRATION RATE: 15 BRPM | OXYGEN SATURATION: 99 % | TEMPERATURE: 98.5 F | HEART RATE: 82 BPM | SYSTOLIC BLOOD PRESSURE: 134 MMHG | WEIGHT: 218.6 LBS | BODY MASS INDEX: 35.28 KG/M2

## 2024-11-27 DIAGNOSIS — M54.50 ACUTE LEFT-SIDED LOW BACK PAIN, UNSPECIFIED WHETHER SCIATICA PRESENT: Primary | ICD-10-CM

## 2024-11-27 PROCEDURE — 99213 OFFICE O/P EST LOW 20 MIN: CPT

## 2024-11-27 ASSESSMENT — PAIN SCALES - GENERAL: PAINLEVEL_OUTOF10: EXTREME PAIN (8)

## 2024-11-27 NOTE — LETTER
November 27, 2024      Deni Simental  [address] [facility] [address]        To Whom It May Concern:    Deni Simental  was seen on 11/27/2024.  Please excuse her  until 11/1/2024 due to injury.        Sincerely,        NAYANA Christiansen CNP

## 2024-11-27 NOTE — PROGRESS NOTES
Patient presents with:  Back Pain: Lower left back pain since work injury on Saturday. Pt was seen in clinic day of injury but states she needs another letter for work restriction as she is still having trouble walking, bending, twisting.       Clinical Decision Making:      ICD-10-CM    1. Acute left-sided low back pain, unspecified whether sciatica present  M54.50 PRIMARY CARE FOLLOW-UP SCHEDULING        Differential diagnosis for back pain includes muscle spasm/strain, slipped disc w/ radicular pain including sciatica, slipped disc w/ cauda equina syndrome,vertebral fracture, vertebral tumor, epidural abscess / discitis, or pyelonephritis.    I do not believe a fracture to be the source of this patient's pain as there was no preceding trauma.  Therefore, no imaging of the spine was  done.    I do not believe the pain is caused by an epidural abscess as the patient does not have fevers/chills or recent procedures.    I do not believe this patient's pain is from an infiltrative vertebral tumor as the patient does not have weight loss or night sweats and no known history of cancer.    I do not believe this patient's pain represents a rupture AAA.  There is no palpable, pulsatile mass on exam and patient does not have any ABD pain.    Patient do not have urinary symptoms or CVA tenderness to suggest pyelonephritis.      Based on history and exam, the most likely etiology of this patient's back pain is muscle spasm/strain.  Emergent MRI is not indicated as this patient does not have new weakness or cauda equina syndrome.  Patient hasno bowel or bladder incontinence. Patient was seen in clinic on 11/23 and provided Flexeril. Also recommend that she continue ibuprofen/tylenol as needed. She is seeking a work note today as she feels she will not be able to work on Friday and Saturday due to her injury.  I provided a work note for Friday and Saturday but told her that beyond that she will have to follow-up with her PCP  for further follow-up as we do not provide long-term work comp in urgent care.  Patient verbalized understanding and referral to PCP was placed. Will seek emergency care with new weakness/paresthesias, loss of continence, fever or worsening symptoms.     Patient Instructions   I will write a work excuse for your work on Friday and Saturday. Beyond that, I would follow-up with your primary care provider for ongoing need of work comp, I have placed a referral for you. If you develop numbness to the sacrum, leg weakness, fevers, loss of bowel/bladder, go to ER for further evaluation.     HPI:  Deni Simental is a 44 year old female who presents today with concerns of lower back pain.  She works for the [employer] and was at work on Saturday (4 days ago), lifting parcels from her hamper into her truck.  She was lifting a partial that was light, approximately 5 or 6 pounds, when she felt sudden spasm in her back.  She feels pain across her entire low back, but pain is worse today on the left side, as she feels like she gets more muscle spasms to her left back.  She also feels an achy pain in her right thigh but no numbness, tingling, or radiating pain.  Pain is worse with movement, specifically twisting or standing up from a chair. She feels like pain has improved since the initial injury but she comes in today because she still feels like she will be unable to work on Friday and Saturday due to her injury and is seeking a work note. She denies loss of bowel/bladder, fevers, leg weakness, or numbness to the sacrum.     History obtained from the patient.    Problem List:  2021-11: Adult ADHD  2019-06: Thyroglobulin antibody positive  2019-06: Alopecia  2019-06: Fatigue, unspecified type  2019-06: Poor sleep hygiene  2019-06: Thyroid nodule  2017-11: Hashimoto's thyroiditis  2017-09: Migraine with aura and without status migrainosus, not   intractable  2016-09: Abnormal thyroid ultrasound  2013-05: PIH (pregnancy induced  hypertension), antepartum  2013: Supervision of high-risk pregnancy  2013: Supervision of high-risk pregnancy  2013:  uterine contractions, antepartum  2012: Encounter for supervision of other normal pregnancy  2012: Placenta previa with hemorrhage  2010-10: CARDIOVASCULAR SCREENING; LDL GOAL LESS THAN 160  2007: Pain in joint, lower leg      Past Medical History:   Diagnosis Date    Allergy     multiple    Ankle sprain 4/10    Arthritis     Depression     in teens -     Hashimoto's thyroiditis     IUD (intrauterine device) in place     mirena 05    Uncomplicated asthma        Social History     Tobacco Use    Smoking status: Former     Current packs/day: 0.00     Types: Cigarettes     Quit date: 2011     Years since quittin.2    Smokeless tobacco: Never    Tobacco comments:     Lives in smoke free household   Substance Use Topics    Alcohol use: No        ROS: 10 point ROS neg other than the symptoms noted above in the HPI.     Vitals:    24 1133   BP: 134/84   BP Location: Left arm   Cuff Size: Adult Regular   Pulse: 82   Resp: 15   Temp: 98.5  F (36.9  C)   TempSrc: Tympanic   SpO2: 99%   Weight: 99.2 kg (218 lb 9.6 oz)       Physical Exam  Constitutional:       General: She is not in acute distress.     Appearance: She is not diaphoretic.   HENT:      Head: Normocephalic and atraumatic.      Right Ear: External ear normal.      Left Ear: External ear normal.   Eyes:      Conjunctiva/sclera: Conjunctivae normal.   Cardiovascular:      Rate and Rhythm: Normal rate and regular rhythm.   Pulmonary:      Effort: Pulmonary effort is normal. No respiratory distress.   Musculoskeletal:         General: No swelling or tenderness. Normal range of motion.      Comments: Full range of motion at hips and back. Pain to the lower back, more on the left side with twisting motion and standing up from the chair. Normal gait. No leg weakness.    Skin:     General: Skin is warm.       Findings: No erythema or rash.   Neurological:      General: No focal deficit present.      Mental Status: She is alert.      Sensory: No sensory deficit.      Motor: No weakness.      Gait: Gait normal.   Psychiatric:         Mood and Affect: Mood normal.         Thought Content: Thought content normal.         Judgment: Judgment normal.           At the end of the encounter, I discussed results, diagnosis, medications. Discussed red flags for immediate return to clinic/ER, as well as indications for follow up if no improvement. Patient understood and agreed to plan. Patient was stable for discharge.    NAYANA Christiansen [facility]

## 2024-11-27 NOTE — PATIENT INSTRUCTIONS
I will write a work excuse for your work on Friday and Saturday. Beyond that, I would follow-up with your primary care provider for ongoing need of work comp, I have placed a referral for you. If you develop numbness to the sacrum, leg weakness, fevers, loss of bowel/bladder, go to ER for further evaluation.

## 2024-11-27 NOTE — LETTER
November 27, 2024      Deni Simental  [address] [address] [facility] [address]        To Whom It May Concern:    Deni Simental was seen in our clinic. She may return to work with the following: {work restrictions for clinic work note:[id number]} on or about ***.      Sincerely,      Maddison Jeffers

## 2024-11-27 NOTE — LETTER
November 27, 2024      Deni JARROD Hola  [address] [facility] [address]        To Whom It May Concern:    Deni Simental  was seen on 11/27/2024.  Please excuse her  until 12/1/2024 due to injury.        Sincerely,        NAYANA Christiansen CNP

## 2024-12-06 ENCOUNTER — ANCILLARY PROCEDURE (OUTPATIENT)
Dept: MRI IMAGING | Facility: CLINIC | Age: 44
End: 2024-12-06
Attending: STUDENT IN AN ORGANIZED HEALTH CARE EDUCATION/TRAINING PROGRAM
Payer: COMMERCIAL

## 2024-12-06 DIAGNOSIS — R51.9 WORSENING HEADACHES: ICD-10-CM

## 2024-12-06 PROCEDURE — A9585 GADOBUTROL INJECTION: HCPCS | Mod: JZ | Performed by: RADIOLOGY

## 2024-12-06 PROCEDURE — 70553 MRI BRAIN STEM W/O & W/DYE: CPT | Mod: TC | Performed by: RADIOLOGY

## 2024-12-06 RX ORDER — GADOBUTROL 604.72 MG/ML
10 INJECTION INTRAVENOUS ONCE
Status: COMPLETED | OUTPATIENT
Start: 2024-12-06 | End: 2024-12-06

## 2024-12-06 RX ADMIN — GADOBUTROL 10 ML: 604.72 INJECTION INTRAVENOUS at 13:22

## 2024-12-10 DIAGNOSIS — G43.019 INTRACTABLE MIGRAINE WITHOUT AURA AND WITHOUT STATUS MIGRAINOSUS: Primary | ICD-10-CM

## 2024-12-10 RX ORDER — RIMEGEPANT SULFATE 75 MG/75MG
75 TABLET, ORALLY DISINTEGRATING ORAL
Qty: 16 TABLET | Refills: 5 | Status: SHIPPED | OUTPATIENT
Start: 2024-12-10

## 2024-12-10 NOTE — TELEPHONE ENCOUNTER
Pending Prescriptions:                       Disp   Refills    NURTEC 75 MG ODT tablet                                         Requested Pharmacy: [facility] in [place]    Pt's last office visit:11/11/24  Next scheduled office visit: 3/26/25      Per the RN/LPN medication refill protocol, writer is unable to refill this request.

## 2025-01-16 ENCOUNTER — VIRTUAL VISIT (OUTPATIENT)
Dept: FAMILY MEDICINE | Facility: CLINIC | Age: 45
End: 2025-01-16
Payer: COMMERCIAL

## 2025-01-16 DIAGNOSIS — J10.1 INFLUENZA A: Primary | ICD-10-CM

## 2025-01-16 RX ORDER — OSELTAMIVIR PHOSPHATE 75 MG/1
75 CAPSULE ORAL 2 TIMES DAILY
Qty: 10 CAPSULE | Refills: 0 | Status: SHIPPED | OUTPATIENT
Start: 2025-01-16 | End: 2025-01-21

## 2025-01-16 ASSESSMENT — ASTHMA QUESTIONNAIRES
QUESTION_3 LAST FOUR WEEKS HOW OFTEN DID YOUR ASTHMA SYMPTOMS (WHEEZING, COUGHING, SHORTNESS OF BREATH, CHEST TIGHTNESS OR PAIN) WAKE YOU UP AT NIGHT OR EARLIER THAN USUAL IN THE MORNING: NOT AT ALL
ACT_TOTALSCORE: 21
QUESTION_5 LAST FOUR WEEKS HOW WOULD YOU RATE YOUR ASTHMA CONTROL: COMPLETELY CONTROLLED
QUESTION_2 LAST FOUR WEEKS HOW OFTEN HAVE YOU HAD SHORTNESS OF BREATH: THREE TO SIX TIMES A WEEK
QUESTION_1 LAST FOUR WEEKS HOW MUCH OF THE TIME DID YOUR ASTHMA KEEP YOU FROM GETTING AS MUCH DONE AT WORK, SCHOOL OR AT HOME: NONE OF THE TIME
ACT_TOTALSCORE: 21
QUESTION_4 LAST FOUR WEEKS HOW OFTEN HAVE YOU USED YOUR RESCUE INHALER OR NEBULIZER MEDICATION (SUCH AS ALBUTEROL): TWO OR THREE TIMES PER WEEK

## 2025-01-16 ASSESSMENT — ENCOUNTER SYMPTOMS
COUGH: 1
HEADACHES: 1

## 2025-01-16 NOTE — LETTER
2025    Deni Simental   1980        To Whom it May Concern;    Please excuse Deni Simental from work/school for a healthcare visit on 2025. Due to a medical illness, she will be out of work from 2025 but may return to work 2025.     Sincerely,        Clif Falk MD

## 2025-01-16 NOTE — PROGRESS NOTES
Deni is a 44 year old who is being evaluated via a billable video visit.    How would you like to obtain your AVS? MyChart  If the video visit is dropped, the invitation should be resent by: Text to cell phone: [phone]  Will anyone else be joining your video visit? No      Assessment & Plan     Influenza A  Symptomatic therapy suggested: push fluids, rest, gargle warm salt water, use vaporizer or mist needed , and use acetaminophen, ibuprofen, antihistamine-decongestant of choice as needed.  - oseltamivir (TAMIFLU) 75 MG capsule; Take 1 capsule (75 mg) by mouth 2 times daily for 5 days. Cross allergy noted with Red Dye #40, but patient notes Hives and allergy symptoms come mostly from food. She has been able to tolerate over the counter and prescription medications in the past.         Subjective   Deni is a 44 year old, presenting for the following health issues:  Cough and Flu Symptoms        1/16/2025     8:47 AM   Additional Questions   Roomed by AD   Accompanied by Self     Cough  Associated symptoms include headaches.   Headache     History of Present Illness       Reason for visit:  Coughing, headache, body pain  Symptom onset:  1-3 days ago  Symptoms include:  Coughing, headache, body pain  Symptom intensity:  Moderate  Symptom progression:  Worsening  Had these symptoms before:  No  What makes it worse:  Talking  What makes it better:  No   She is taking medications regularly.       Acute Illness  Acute illness concerns: Coughing, headache and body aches  Onset/Duration: Symptoms started yesterday  Symptoms:  Fever: No  Chills/Sweats: No  Headache (location?): YES  Sinus Pressure: YES  Conjunctivitis:  No  Ear Pain: no  Rhinorrhea: YES  Congestion: YES  Sore Throat: YES  Cough: YES- Productive but not enough to get anything out  Wheeze: YES- A little wheeze  Decreased Appetite: YES  Nausea: No  Vomiting: No  Diarrhea: No  Dysuria/Freq.: No  Dysuria or Hematuria: No  Fatigue/Achiness:  YES  Sick/Strep Exposure: YES-  tested positive for Influenza A  Therapies tried and outcome: Pushing fluids        Review of Systems  Constitutional, HEENT, cardiovascular, pulmonary, GI, , musculoskeletal, neuro, skin, endocrine and psych systems are negative, except as otherwise noted.      Objective           Vitals:  No vitals were obtained today due to virtual visit.    Physical Exam   GENERAL: ill appearing but in no respiratory distress.  EYES: Eyes grossly normal to inspection.  No discharge or erythema, or obvious scleral/conjunctival abnormalities.  RESP: audible dry cough  SKIN: Visible skin clear. No significant rash, abnormal pigmentation or lesions.  NEURO: Cranial nerves grossly intact.  Mentation and speech appropriate for age.  PSYCH: Appropriate affect, tone, and pace of words        Video-Visit Details    Type of service:  Video Visit   Originating Location (pt. Location): Home    Distant Location (provider location):  Off-site  Platform used for Video Visit: Conchita  Signed Electronically by: Clif Falk MD

## 2025-02-05 ENCOUNTER — MYC MEDICAL ADVICE (OUTPATIENT)
Dept: FAMILY MEDICINE | Facility: CLINIC | Age: 45
End: 2025-02-05
Payer: COMMERCIAL

## 2025-02-11 ENCOUNTER — VIRTUAL VISIT (OUTPATIENT)
Dept: FAMILY MEDICINE | Facility: CLINIC | Age: 45
End: 2025-02-11
Payer: COMMERCIAL

## 2025-02-11 DIAGNOSIS — N92.1 MENORRHAGIA WITH IRREGULAR CYCLE: ICD-10-CM

## 2025-02-11 DIAGNOSIS — G43.E09 CHRONIC MIGRAINE WITH AURA WITHOUT STATUS MIGRAINOSUS, NOT INTRACTABLE: ICD-10-CM

## 2025-02-11 PROCEDURE — 98005 SYNCH AUDIO-VIDEO EST LOW 20: CPT | Performed by: FAMILY MEDICINE

## 2025-02-11 RX ORDER — ACETAMINOPHEN AND CODEINE PHOSPHATE 120; 12 MG/5ML; MG/5ML
0.35 SOLUTION ORAL DAILY
Qty: 84 TABLET | Refills: 1 | Status: SHIPPED | OUTPATIENT
Start: 2025-02-11

## 2025-02-11 NOTE — PROGRESS NOTES
"Deni is a 44 year old who is being evaluated via a billable video visit.    How would you like to obtain your AVS? MyChart  If the video visit is dropped, the invitation should be resent by: Text to cell phone: [phone]  Will anyone else be joining your video visit? No      Assessment & Plan     (G43.E09) Chronic migraine with aura without status migrainosus, not intractable  (N92.1) Menorrhagia with irregular cycle  Comment: menstrual trigger, bleeding controlled if able to remain on schedule with pills  Plan: norethindrone (MICRONOR) 0.35 MG tablet        Start micronor, consider aygestin if still with bleeding.  6 month supply sent.  Pt will stop current pack, cycle and then start new pills  She will check with insurance regarding getting more than 1 pack per month, likely they want her to use a mail-order pharmacy    The longitudinal plan of care for the diagnosis(es)/condition(s) as documented were addressed during this visit. Due to the added complexity in care, I will continue to support Deni in the subsequent management and with ongoing continuity of care.          See Patient Instructions    Subjective   Deni is a 44 year old, presenting for the following health issues:  No chief complaint on file.        1/16/2025     8:47 AM   Additional Questions   Roomed by AD   Accompanied by Self     History of Present Illness       Headaches:   Since the patient's last clinic visit, headaches are: no change  The patient is getting headaches:  Daily  She is able to do normal daily activities when she has a migraine.  The patient is taking the following rescue/relief medications:  No rescue/relief medications   Patient states \"I get no relief\" from the rescue/relief medications.   The patient is taking the following medications to prevent migraines:  Other  In the past 4 weeks, the patient has gone to an Urgent Care or Emergency Room 0 times times due to headaches.    Reason for visit:  Hormone control, " leading to migraines    She eats 0-1 servings of fruits and vegetables daily.She consumes 3 sweetened beverage(s) daily.She exercises with enough effort to increase her heart rate 10 to 19 minutes per day.  She exercises with enough effort to increase her heart rate 3 or less days per week.   She is taking medications regularly.     The patient I am meeting today to discuss her birth control options.  Her insurance is not letting her fill more than 1 month at a time for her birth control at a local pharmacy as she is on continuous so it is difficult for her to remain on schedule and is getting a lot of interrupted spotting and bleeding.  She is on birth control pills to suppress her cycle because of her severe menstrual migraines.  Her spouse has a vasectomy so birth control is not an issue  She is asking if she can have her uterus and ovaries removed.  She has tried hormonal IUDs in the past however because there is no impact on the ovaries she continues to get severe migraines with her cycles.  She does not want to consider Nexplanon or Depo because of the weight gain possibility.  She struggles with weight already.    Review of her medication list does not show any history of a trial of progestin only birth control pills.            Review of Systems  Constitutional, HEENT, cardiovascular, pulmonary, gi and gu systems are negative, except as otherwise noted.      Objective           Vitals:  No vitals were obtained today due to virtual visit.    Physical Exam   GENERAL: alert and no distress  EYES: Eyes grossly normal to inspection.  No discharge or erythema, or obvious scleral/conjunctival abnormalities.  RESP: No audible wheeze, cough, or visible cyanosis.    SKIN: Visible skin clear. No significant rash, abnormal pigmentation or lesions.  NEURO: Cranial nerves grossly intact.  Mentation and speech appropriate for age.  PSYCH: Appropriate affect, tone, and pace of words          Video-Visit Details    Type of  service:  Video Visit   Originating Location (pt. Location): Home    Distant Location (provider location):  On-site  Platform used for Video Visit: Conchita  Signed Electronically by: Radha Lay MD

## 2025-02-20 ENCOUNTER — VIRTUAL VISIT (OUTPATIENT)
Dept: FAMILY MEDICINE | Facility: CLINIC | Age: 45
End: 2025-02-20
Payer: COMMERCIAL

## 2025-02-20 DIAGNOSIS — G43.109 MIGRAINE WITH AURA AND WITHOUT STATUS MIGRAINOSUS, NOT INTRACTABLE: ICD-10-CM

## 2025-02-20 DIAGNOSIS — Z02.9 ADMINISTRATIVE ENCOUNTER: Primary | ICD-10-CM

## 2025-02-20 NOTE — PROGRESS NOTES
"Deni is a 44 year old who is being evaluated via a billable video visit.    How would you like to obtain your AVS? MyChart  If the video visit is dropped, the invitation should be resent by: Text to cell phone: [phone]  Will anyone else be joining your video visit? No      Assessment & Plan     Administrative encounter  Migraine with aura and without status migrainosus, not intractable    I am seeing the patient for the first time.  Patient presented via video visit mainly requesting a note to reduce her work days.  The patient reports she works as a , she is required now to work 6 days/week over the past 12 months now.  She has a history of chronic migraine and had back injury in November.  She reports that she is not able to work 6 days/week and requesting a doctor note to allow her workplace days.  Discussed with the patient his concern.  Based on the available information, I am not able to provide a note to reduce her workdays.  Informant the patient that I will not be able to write a note for her to reduce her work days.  Advised to make an appointment in person with her primary care and her neurologist for further discussion.  May consider FMLA if she does not have one.  She does not need medications for her migraine today.  Patient verbalized understanding and agreed on the plan of care. All questions answered.     No charge for this visit.  BMI  Estimated body mass index is 35.28 kg/m  as calculated from the following:    Height as of 11/20/24: 1.676 m (5' 6\").    Weight as of 11/27/24: 99.2 kg (218 lb 9.6 oz).             Subjective   Deni is a 44 year old, presenting for the following health issues:  Consult (Needs a note for work)        2/20/2025     8:55 AM   Additional Questions   Roomed by Ninoska   Accompanied by self     History of Present Illness       Headaches:   Since the patient's last clinic visit, headaches are: no change  The patient is getting headaches:  Daily  She is " "able to do normal daily activities when she has a migraine.  The patient is taking the following rescue/relief medications:  No rescue/relief medications   Patient states \"I get no relief\" from the rescue/relief medications.   The patient is taking the following medications to prevent migraines:  Other  In the past 4 weeks, the patient has gone to an Urgent Care or Emergency Room 0 times times due to headaches.    Reason for visit:  Hormone control, leading to migraines    She eats 0-1 servings of fruits and vegetables daily.She consumes 3 sweetened beverage(s) daily.She exercises with enough effort to increase her heart rate 10 to 19 minutes per day.  She exercises with enough effort to increase her heart rate 3 or less days per week.   She is taking medications regularly.                   Objective           Vitals:  No vitals were obtained today due to virtual visit.    Physical Exam  Vitals and nursing note reviewed.   Constitutional:       General: She is not in acute distress.     Appearance: Normal appearance. She is not ill-appearing, toxic-appearing or diaphoretic.   HENT:      Head: Normocephalic and atraumatic.   Neurological:      Mental Status: She is alert.                  Video-Visit Details    Type of service:  Video Visit   Originating Location (pt. Location): Home    Distant Location (provider location):  On-site  Platform used for Video Visit: Conchita  Signed Electronically by: Ofelia Alarcon MD    "

## 2025-03-04 ENCOUNTER — OFFICE VISIT (OUTPATIENT)
Dept: FAMILY MEDICINE | Facility: CLINIC | Age: 45
End: 2025-03-04
Payer: COMMERCIAL

## 2025-03-04 VITALS
RESPIRATION RATE: 16 BRPM | WEIGHT: 221.2 LBS | HEART RATE: 78 BPM | SYSTOLIC BLOOD PRESSURE: 111 MMHG | HEIGHT: 65 IN | OXYGEN SATURATION: 100 % | TEMPERATURE: 98.3 F | BODY MASS INDEX: 36.85 KG/M2 | DIASTOLIC BLOOD PRESSURE: 75 MMHG

## 2025-03-04 DIAGNOSIS — G43.109 MIGRAINE WITH AURA AND WITHOUT STATUS MIGRAINOSUS, NOT INTRACTABLE: Primary | ICD-10-CM

## 2025-03-04 PROCEDURE — G2211 COMPLEX E/M VISIT ADD ON: HCPCS | Performed by: FAMILY MEDICINE

## 2025-03-04 PROCEDURE — 99213 OFFICE O/P EST LOW 20 MIN: CPT | Performed by: FAMILY MEDICINE

## 2025-03-04 ASSESSMENT — PAIN SCALES - GENERAL: PAINLEVEL_OUTOF10: MODERATE PAIN (5)

## 2025-03-04 ASSESSMENT — ENCOUNTER SYMPTOMS: HEADACHES: 1

## 2025-03-04 NOTE — PROGRESS NOTES
"  Assessment & Plan     (G43.109) Migraine with aura and without status migrainosus, not intractable  (primary encounter diagnosis)  Comment: triggered by menses, fatigue  Plan: letter for 5 day work week  If needs fmla, will need neurology to complete as they are mainly managing this.    The longitudinal plan of care for the diagnosis(es)/condition(s) as documented were addressed during this visit. Due to the added complexity in care, I will continue to support Deni in the subsequent management and with ongoing continuity of care.      BMI  Estimated body mass index is 36.81 kg/m  as calculated from the following:    Height as of this encounter: 1.651 m (5' 5\").    Weight as of this encounter: 100.3 kg (221 lb 3.2 oz).   Weight management plan: Discussed healthy diet and exercise guidelines      See Patient Instructions    Subjective   Deni is a 44 year old, presenting for the following health issues:  Headache      3/4/2025    10:08 AM   Additional Questions   Roomed by Ike     Headache     History of Present Illness       Reason for visit:  Note for work  Symptoms include:  Work made it mandatory to work 6 days per week plus over time in December.  With chronic migraines patient would like a note to state can only work 5 days per week   She is taking medications regularly.      Severe menstrual migraines, started new progestin suppression regimen still in first month. Needs letter for work to not work more than 5 days per work week. This exacerbates her headaches and triggers more ha.  Ok with working overtime if needed.              Review of Systems  Constitutional, HEENT, cardiovascular, pulmonary, gi and gu systems are negative, except as otherwise noted.      Objective    /75   Pulse 78   Temp 98.3  F (36.8  C) (Oral)   Resp 16   Ht 1.651 m (5' 5\")   Wt 100.3 kg (221 lb 3.2 oz)   LMP 01/14/2025 (Within Days)   SpO2 100%   BMI 36.81 kg/m    Body mass index is 36.81 kg/m .  Physical Exam "   GENERAL: alert and no distress  EYES: Eyes grossly normal to inspection, PERRL and conjunctivae and sclerae normal  MS: no gross musculoskeletal defects noted, no edema  SKIN: no suspicious lesions or rashes  PSYCH: mentation appears normal, affect normal/bright            Signed Electronically by: Radha Lay MD

## 2025-03-04 NOTE — LETTER
March 4, 2025      Jaredsumi GARAY Hola  [address] [facility] [address]        To Whom It May Concern,     Deni Simental,1980 has a condition that limits her ability to work more than a 5 day work week.  She cannot work more than 5 consecutive days.      Thank you for your consideration in this matter.        Sincerely,        Radha Lay MD

## 2025-03-04 NOTE — LETTER
My Asthma Action Plan    Name: Deni Simental   YOB: 1980  Date: 3/4/2025   My doctor: Radha Lay MD   My clinic: [facility]        My Rescue Medicine:   Albuterol inhaler (Proair/Ventolin/Proventil HFA)  2-4 puffs EVERY 4 HOURS as needed. Use a spacer if recommended by your provider.   My Asthma Severity:   Intermittent / Exercise Induced  Know your asthma triggers: upper respiratory infections             GREEN ZONE   Good Control  I feel good  No cough or wheeze  Can work, sleep and play without asthma symptoms       Take your asthma control medicine every day.     If exercise triggers your asthma, take your rescue medication  15 minutes before exercise or sports, and  During exercise if you have asthma symptoms  Spacer to use with inhaler: If you have a spacer, make sure to use it with your inhaler             YELLOW ZONE Getting Worse  I have ANY of these:  I do not feel good  Cough or wheeze  Chest feels tight  Wake up at night   Keep taking your Green Zone medications  Start taking your rescue medicine:  every 20 minutes for up to 1 hour. Then every 4 hours for 24-48 hours.  If you stay in the Yellow Zone for more than 12-24 hours, contact your doctor.  If you do not return to the Green Zone in 12-24 hours or you get worse, start taking your oral steroid medicine if prescribed by your provider.           RED ZONE Medical Alert - Get Help  I have ANY of these:  I feel awful  Medicine is not helping  Breathing getting harder  Trouble walking or talking  Nose opens wide to breathe       Take your rescue medicine NOW  If your provider has prescribed an oral steroid medicine, start taking it NOW  Call your doctor NOW  If you are still in the Red Zone after 20 minutes and you have not reached your doctor:  Take your rescue medicine again and  Call 911 or go to the emergency room right away    See your regular doctor within 2 weeks of an Emergency Room or Urgent  Care visit for follow-up treatment.          Annual Reminders:  Meet with Asthma Educator,  Flu Shot in the Fall, consider Pneumonia Vaccination for patients with asthma (aged 19 and older).    Pharmacy:    [id number] - [place], [place] - [address] [facility]  [id number] - [place], [place] - [address] [facility] [address]  [id number] - [place], [place] - [address] [address] [place] [address]  [id number] - [place], [place] - [address] [facility]  [id number] - [place], [place] - [address] [facility]    Electronically signed by Radha Lay MD   Date: 03/04/25                    Asthma Triggers  How To Control Things That Make Your Asthma Worse    Triggers are things that make your asthma worse.  Look at the list below to help you find your triggers and   what you can do about them. You can help prevent asthma flare-ups by staying away from your triggers.      Trigger                                                          What you can do   Cigarette Smoke  Tobacco smoke can make asthma worse. Do not allow smoking in your home, car or around you.  Be sure no one smokes at a child s day care or school.  If you smoke, ask your health care provider for ways to help you quit.  Ask family members to quit too.  Ask your health care provider for a referral to Quit Plan to help you quit smoking, or call 2-072-927-PLAN.     Colds, Flu, Bronchitis  These are common triggers of asthma. Wash your hands often.  Don t touch your eyes, nose or mouth.  Get a flu shot every year.     Dust Mites  These are tiny bugs that live in cloth or carpet. They are too small to see. Wash sheets and blankets in hot water every week.   Encase pillows and mattress in dust mite proof covers.  Avoid having carpet if you can. If you have carpet, vacuum weekly.   Use a dust mask and HEPA vacuum.   Pollen and Outdoor Mold  Some people are allergic to trees, grass, or weed pollen, or molds. Try to keep your windows closed.  Limit time out doors when pollen count  is high.   Ask you health care provider about taking medicine during allergy season.     Animal Dander  Some people are allergic to skin flakes, urine or saliva from pets with fur or feathers. Keep pets with fur or feathers out of your home.    If you can t keep the pet outdoors, then keep the pet out of your bedroom.  Keep the bedroom door closed.  Keep pets off cloth furniture and away from stuffed toys.     Mice, Rats, and Cockroaches  Some people are allergic to the waste from these pests.   Cover food and garbage.  Clean up spills and food crumbs.  Store grease in the refrigerator.   Keep food out of the bedroom.   Indoor Mold  This can be a trigger if your home has high moisture. Fix leaking faucets, pipes, or other sources of water.   Clean moldy surfaces.  Dehumidify basement if it is damp and smelly.   Smoke, Strong Odors, and Sprays  These can reduce air quality. Stay away from strong odors and sprays, such as perfume, powder, hair spray, paints, smoke incense, paint, cleaning products, candles and new carpet.   Exercise or Sports  Some people with asthma have this trigger. Be active!  Ask your doctor about taking medicine before sports or exercise to prevent symptoms.    Warm up for 5-10 minutes before and after sports or exercise.     Other Triggers of Asthma  Cold air:  Cover your nose and mouth with a scarf.  Sometimes laughing or crying can be a trigger.  Some medicines and food can trigger asthma.

## 2025-03-25 NOTE — PROGRESS NOTES
[facility]  Section of General Neurology  Return Patient  Virtual Visit    Deni Simental MRN# [id number]   Age: 44 year old YOB: 1980            Assessment and Plan:   Assessment:  Deni Simental is a very pleasant 44 year old female seen in follow up for migraine headaches.   They remain worse than previously after being pretty well controlled though she notes propranolol has been helpful.  Still awaiting botox to kick and an progestin as they are catamenial in nature.  Discussed mirena if systemic side effects untenable but she tried this previously in her 20s and had issues.  She is off of nortriptyline (ran out, no change in symptoms).  We will continue to try to make adjustments to improve quality of life.  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      Plan:  Continue to monitor off of nortriptyline  Continue nurtec every other day  Continue topamax 100/200  Continue botox as planned   Resume Ubrelvy 100 mg PRN  Increase propranolol to 80 mg daily, side effects re reviewed  Future idea: cymbalta/effexor  Follow up in ~4 months, to reach out sooner with any issues questions or changes      Video timing 1254-109 PM  Used: Jason  Provider location: on site  Pt location: home    Charles Vega MD   of Neurology   [facility]      Interval history:     Botox scheduled for 3/28, had as well in January.  Didn't think it was helpful.      Progestin for menstrual migraines--feels like going through menopause, trying to see if this will help    Screaming migraine right now.    Still daily but propranolol is going well--migraines were a lot less intense    Ran out of nortriptyline, didn't notice a difference.    Mirena IUD--body didn't like it.      Future options: effexor, cymbalta --discussed.     Ubrelvy rescue--not taking, will try to resume  triptans x2 not  helpful previously   Nurtec every other day --likely helping to continue    A/P at last visit  Deni Simental is a very pleasant 44 year old female seen in follow up for migraine headaches.   They remain worse than previously after being pretty well controlled.  Discussed options.  I do think we should rule out secondary causes further as below given how long this has gone on for.  Long term hopefully when doing better can wean her lower or off some of these.  For now to discussion given how much they are limiting her life/often essentially daily will continue as below.      Plan:  Continue nortriptyline to 75 mg at bedtime  Continue topamax 100 qam /200 QHS  Continue nurtec 75 mg EOD   Continue Ubrelvy 100 mg PRN  Headache clinic appointment tomorrow to discuss botox among other options  MRI brain w/w/o to ensure no secondary cause given how refractory this has been  Addition of propranolol 60 mg daily, side effects reviewed  Prednisone 50 mg x5 days to break headache cycle  Follow up with me in ~3 months virtually or in person    Recent PCP note reviewed    Reason for visit:  Note for work  Symptoms include:  Work made it mandatory to work 6 days per week plus over time in December.  With chronic migraines patient would like a note to state can only work 5 days per week   She is taking medications regularly.      Severe menstrual migraines, started new progestin suppression regimen still in first month. Needs letter for work to not work more than 5 days per work week. This exacerbates her headaches and triggers more ha.  Ok with working overtime if needed.         Past Medical History:     Patient Active Problem List   Diagnosis    Migraine with aura and without status migrainosus, not intractable    Hashimoto's thyroiditis    Thyroglobulin antibody positive    Alopecia    Fatigue, unspecified type    Poor sleep hygiene    Thyroid nodule    Adult ADHD     Past Medical History:   Diagnosis Date     Allergy     multiple    Ankle sprain 2010    Arthritis     Depression     in teens -     Hashimoto's thyroiditis     Uncomplicated asthma         Past Surgical History:     Past Surgical History:   Procedure Laterality Date    HC TOOTH EXTRACTION W/FORCEP          Social History:     Social History     Tobacco Use    Smoking status: Former     Current packs/day: 0.00     Types: Cigarettes     Quit date: 2011     Years since quittin.5    Smokeless tobacco: Never    Tobacco comments:     Lives in smoke free household   Vaping Use    Vaping status: Never Used   Substance Use Topics    Alcohol use: No    Drug use: No        Family History:     Family History   Problem Relation Age of Onset    Alcohol/Drug Mother          MVA alcohol related    Diabetes Father         hypoglycemia    Hypertension Father     Psoriatic Arthritis Father     Psoriasis Father     Graves' disease Sister         Graves, tumor    Cerebrovascular Disease Maternal Grandmother     Hypothyroidism Paternal Grandmother     Cancer Paternal Grandfather         lung cancer    C.A.D. No family hx of     Depression No family hx of     Gastrointestinal Disease No family hx of     Heart Disease No family hx of     Lipids No family hx of     Neurologic Disorder No family hx of     Respiratory No family hx of     Glaucoma No family hx of     Macular Degeneration No family hx of         Medications:     Current Outpatient Medications   Medication Sig Dispense Refill    albuterol (PROAIR HFA/PROVENTIL HFA/VENTOLIN HFA) 108 (90 Base) MCG/ACT inhaler Inhale 1-2 puffs into the lungs every 4 hours as needed for shortness of breath, wheezing or cough. 18 g 3    albuterol (PROVENTIL) (2.5 MG/3ML) 0.083% neb solution Inhale 2.5 mg into the lungs as needed.      diclofenac (VOLTAREN) 75 MG EC tablet Take 1 tablet (75 mg) by mouth 2 times daily. 20 tablet 0    guanFACINE (INTUNIV) 2 MG TB24 24 hr tablet Take 1 tablet (2 mg) by mouth at bedtime. 90  tablet 0    nabumetone (RELAFEN) 750 MG tablet TAKE 1 TABLET BY MOUTH TWICE A  tablet 0    norethindrone (MICRONOR) 0.35 MG tablet Take 1 tablet (0.35 mg) by mouth daily. 84 tablet 1    nortriptyline (PAMELOR) 75 MG capsule TAKE 1 CAPSULE BY MOUTH AT BEDTIME 90 capsule 1    NURTEC 75 MG ODT tablet Place 1 tablet (75 mg) under the tongue every 48 hours. 16 tablet 5    propranolol ER (INDERAL LA) 60 MG 24 hr capsule Take 1 capsule (60 mg) by mouth daily. 90 capsule 1    topiramate (TOPAMAX) 100 MG tablet 100 mg qam 200 mg at bedtime 270 tablet 1     Current Facility-Administered Medications   Medication Dose Route Frequency Provider Last Rate Last Admin    Botulinum Toxin Type A (BOTOX) 200 units injection 150 Units  150 Units Intramuscular See Admin Instructions    150 Units at 01/03/25 2411        Allergies:     Allergies   Allergen Reactions    Wasp Venom Hives     Swelling past two joints    Higinio Flavor Hives    Red Dye #40 (Allura Red) Hives    Retin-A [Tretinoin] Hives        Review of Systems:   As noted above     Physical Exam:   General: Seated comfortably in no acute distress.  Neurologic:     Mental Status: Fully alert, attentive and oriented. Speech clear and fluent, no paraphasic errors.     Cranial Nerves: EOM appear intact. Facial movements symmetric. Hearing not formally tested but intact to conversation.  No dysarthria.     Motor: No tremors or other abnormal movements observed.      Sensory:Not able to be tested virtually                   The total time of this encounter today amounted to 33 minutes in total. This time included time spent with the patient, prep work, ordering tests, and performing post visit documentation.    The longitudinal plan of care for migraine headaches was addressed during this visit. Due to the added complexity in care, I will continue to support Ms Simental in the subsequent management of this condition(s) and with the ongoing continuity of care of this  condition(s).

## 2025-03-26 ENCOUNTER — VIRTUAL VISIT (OUTPATIENT)
Dept: NEUROLOGY | Facility: CLINIC | Age: 45
End: 2025-03-26
Payer: COMMERCIAL

## 2025-03-26 DIAGNOSIS — G43.839 INTRACTABLE MENSTRUAL MIGRAINE WITHOUT STATUS MIGRAINOSUS: ICD-10-CM

## 2025-03-26 DIAGNOSIS — G43.019 INTRACTABLE MIGRAINE WITHOUT AURA AND WITHOUT STATUS MIGRAINOSUS: Primary | ICD-10-CM

## 2025-03-26 RX ORDER — PROPRANOLOL HYDROCHLORIDE 80 MG/1
80 CAPSULE, EXTENDED RELEASE ORAL DAILY
Qty: 90 CAPSULE | Refills: 1 | Status: SHIPPED | OUTPATIENT
Start: 2025-03-26

## 2025-03-26 RX ORDER — TOPIRAMATE 100 MG/1
TABLET, FILM COATED ORAL
Qty: 270 TABLET | Refills: 1 | Status: SHIPPED | OUTPATIENT
Start: 2025-03-26

## 2025-03-26 NOTE — PROGRESS NOTES
Deni is a 44 year old who is being evaluated via a billable video visit.    How would you like to obtain your AVS? MyChart  If the video visit is dropped, the invitation should be resent by: Text to cell phone: [phone]  Will anyone else be joining your video visit? No    Video-Visit Details    Type of service:  Video Visit   Originating Location (pt. Location): Home    Distant Location (provider location):  On-site

## 2025-03-26 NOTE — NURSING NOTE
Deni is a 44 year old who is being evaluated via a billable video visit.    How would you like to obtain your AVS? Negoramahart  If the video visit is dropped, the invitation should be resent by: Text to cell phone: [phone]  Will anyone else be joining your video visit? No    Video-Visit Details    Type of service:  Video Visit   Originating Location (pt. Location): Home    Distant Location (provider location):  On-site  Platform used for Video Visit: JaredWell

## 2025-03-26 NOTE — LETTER
3/26/2025      Deni Simental  [address] [address]      Dear Colleague,    Thank you for referring your patient, Deni Simental, to the [facility] [place]. Please see a copy of my visit note below.    [facility]  Section of General Neurology  Return Patient  Virtual Visit    Deni Simental MRN# [id number]   Age: 44 year old YOB: 1980            Assessment and Plan:   Assessment:  Deni Simental is a very pleasant 44 year old female seen in follow up for migraine headaches.   They remain worse than previously after being pretty well controlled though she notes propranolol has been helpful.  Still awaiting botox to kick and an progestin as they are catamenial in nature.  Discussed mirena if systemic side effects untenable but she tried this previously in her 20s and had issues.  She is off of nortriptyline (ran out, no change in symptoms).  We will continue to try to make adjustments to improve quality of life.  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      Plan:  Continue to monitor off of nortriptyline  Continue nurtec every other day  Continue topamax 100/200  Continue botox as planned   Resume Ubrelvy 100 mg PRN  Increase propranolol to 80 mg daily, side effects re reviewed  Future idea: cymbalta/effexor  Follow up in ~4 months, to reach out sooner with any issues questions or changes      Video timing 1254-109 PM  Used: Jason  Provider location: on site  Pt location: home    Charles Vega MD   of Neurology   [facility]      Interval history:     Botox scheduled for 3/28, had as well in January.  Didn't think it was helpful.      Progestin for menstrual migraines--feels like going through menopause, trying to see if this will help    Screaming migraine right now.    Still daily  but propranolol is going well--migraines were a lot less intense    Ran out of nortriptyline, didn't notice a difference.    Mirena IUD--body didn't like it.      Future options: effexoramadambalta --discussed.     Ubrelvy rescue--not taking, will try to resume triptans x2 not  helpful previously   Nurtec every other day --likely helping to continue    A/P at last visit  Deni Simental is a very pleasant 44 year old female seen in follow up for migraine headaches.   They remain worse than previously after being pretty well controlled.  Discussed options.  I do think we should rule out secondary causes further as below given how long this has gone on for.  Long term hopefully when doing better can wean her lower or off some of these.  For now to discussion given how much they are limiting her life/often essentially daily will continue as below.      Plan:  Continue nortriptyline to 75 mg at bedtime  Continue topamax 100 qam /200 QHS  Continue nurtec 75 mg EOD   Continue Ubrelvy 100 mg PRN  Headache clinic appointment tomorrow to discuss botox among other options  MRI brain w/w/o to ensure no secondary cause given how refractory this has been  Addition of propranolol 60 mg daily, side effects reviewed  Prednisone 50 mg x5 days to break headache cycle  Follow up with me in ~3 months virtually or in person    Recent PCP note reviewed    Reason for visit:  Note for work  Symptoms include:  Work made it mandatory to work 6 days per week plus over time in December.  With chronic migraines patient would like a note to state can only work 5 days per week   She is taking medications regularly.      Severe menstrual migraines, started new progestin suppression regimen still in first month. Needs letter for work to not work more than 5 days per work week. This exacerbates her headaches and triggers more ha.  Ok with working overtime if needed.         Past Medical History:     Patient Active Problem List   Diagnosis      Migraine with aura and without status migrainosus, not intractable     Hashimoto's thyroiditis     Thyroglobulin antibody positive     Alopecia     Fatigue, unspecified type     Poor sleep hygiene     Thyroid nodule     Adult ADHD     Past Medical History:   Diagnosis Date     Allergy     multiple     Ankle sprain 2010     Arthritis      Depression     in teens -      Hashimoto's thyroiditis      Uncomplicated asthma         Past Surgical History:     Past Surgical History:   Procedure Laterality Date     HC TOOTH EXTRACTION W/FORCEP          Social History:     Social History     Tobacco Use     Smoking status: Former     Current packs/day: 0.00     Types: Cigarettes     Quit date: 2011     Years since quittin.5     Smokeless tobacco: Never     Tobacco comments:     Lives in smoke free household   Vaping Use     Vaping status: Never Used   Substance Use Topics     Alcohol use: No     Drug use: No        Family History:     Family History   Problem Relation Age of Onset     Alcohol/Drug Mother          MVA alcohol related     Diabetes Father         hypoglycemia     Hypertension Father      Psoriatic Arthritis Father      Psoriasis Father      Graves' disease Sister         Graves, tumor     Cerebrovascular Disease Maternal Grandmother      Hypothyroidism Paternal Grandmother      Cancer Paternal Grandfather         lung cancer     C.A.D. No family hx of      Depression No family hx of      Gastrointestinal Disease No family hx of      Heart Disease No family hx of      Lipids No family hx of      Neurologic Disorder No family hx of      Respiratory No family hx of      Glaucoma No family hx of      Macular Degeneration No family hx of         Medications:     Current Outpatient Medications   Medication Sig Dispense Refill     albuterol (PROAIR HFA/PROVENTIL HFA/VENTOLIN HFA) 108 (90 Base) MCG/ACT inhaler Inhale 1-2 puffs into the lungs every 4 hours as needed for shortness of breath, wheezing or  cough. 18 g 3     albuterol (PROVENTIL) (2.5 MG/3ML) 0.083% neb solution Inhale 2.5 mg into the lungs as needed.       diclofenac (VOLTAREN) 75 MG EC tablet Take 1 tablet (75 mg) by mouth 2 times daily. 20 tablet 0     guanFACINE (INTUNIV) 2 MG TB24 24 hr tablet Take 1 tablet (2 mg) by mouth at bedtime. 90 tablet 0     nabumetone (RELAFEN) 750 MG tablet TAKE 1 TABLET BY MOUTH TWICE A  tablet 0     norethindrone (MICRONOR) 0.35 MG tablet Take 1 tablet (0.35 mg) by mouth daily. 84 tablet 1     nortriptyline (PAMELOR) 75 MG capsule TAKE 1 CAPSULE BY MOUTH AT BEDTIME 90 capsule 1     NURTEC 75 MG ODT tablet Place 1 tablet (75 mg) under the tongue every 48 hours. 16 tablet 5     propranolol ER (INDERAL LA) 60 MG 24 hr capsule Take 1 capsule (60 mg) by mouth daily. 90 capsule 1     topiramate (TOPAMAX) 100 MG tablet 100 mg qam 200 mg at bedtime 270 tablet 1     Current Facility-Administered Medications   Medication Dose Route Frequency Provider Last Rate Last Admin     Botulinum Toxin Type A (BOTOX) 200 units injection 150 Units  150 Units Intramuscular See Admin Instructions    150 Units at 01/03/25 1543        Allergies:     Allergies   Allergen Reactions     Wasp Venom Hives     Swelling past two joints     Stephens Flavor Hives     Red Dye #40 (Allura Red) Hives     Retin-A [Tretinoin] Hives        Review of Systems:   As noted above     Physical Exam:   General: Seated comfortably in no acute distress.  Neurologic:     Mental Status: Fully alert, attentive and oriented. Speech clear and fluent, no paraphasic errors.     Cranial Nerves: EOM appear intact. Facial movements symmetric. Hearing not formally tested but intact to conversation.  No dysarthria.     Motor: No tremors or other abnormal movements observed.      Sensory:Not able to be tested virtually                   The total time of this encounter today amounted to 33 minutes in total. This time included time spent with the patient, prep work, ordering  tests, and performing post visit documentation.    The longitudinal plan of care for migraine headaches was addressed during this visit. Due to the added complexity in care, I will continue to support Ms Simental in the subsequent management of this condition(s) and with the ongoing continuity of care of this condition(s).      Deni is a 44 year old who is being evaluated via a billable video visit.    How would you like to obtain your AVS? MyChart  If the video visit is dropped, the invitation should be resent by: Text to cell phone: [phone]  Will anyone else be joining your video visit? No  {If patient encounters technical issues they should call [phone] :[id number]}  Video-Visit Details    Type of service:  Video Visit   Originating Location (pt. Location): Home  {PROVIDER LOCATION On-site should be selected for visits conducted from your clinic location or adjoining [facility] hospital, academic office, or other nearby [facility] building. Off-site should be selected for all other provider locations, including home:[id number]}  Distant Location (provider location):  On-site        Again, thank you for allowing me to participate in the care of your patient.        Sincerely,        Lev Vega MD    Electronically signed

## 2025-04-28 DIAGNOSIS — R11.0 NAUSEA: Primary | ICD-10-CM

## 2025-04-28 RX ORDER — ONDANSETRON 4 MG/1
4 TABLET, ORALLY DISINTEGRATING ORAL EVERY 8 HOURS PRN
Qty: 90 TABLET | Refills: 3 | Status: SHIPPED | OUTPATIENT
Start: 2025-04-28

## 2025-04-29 DIAGNOSIS — G43.711 INTRACTABLE CHRONIC MIGRAINE WITHOUT AURA AND WITH STATUS MIGRAINOSUS: Primary | ICD-10-CM

## 2025-04-29 DIAGNOSIS — Z79.899 MEDICATION MANAGEMENT: Primary | ICD-10-CM

## 2025-04-29 RX ORDER — DIVALPROEX SODIUM 500 MG/1
500 TABLET, DELAYED RELEASE ORAL 2 TIMES DAILY
Qty: 60 TABLET | Refills: 5 | Status: SHIPPED | OUTPATIENT
Start: 2025-04-29

## 2025-05-15 ENCOUNTER — OFFICE VISIT (OUTPATIENT)
Dept: FAMILY MEDICINE | Facility: CLINIC | Age: 45
End: 2025-05-15
Payer: COMMERCIAL

## 2025-05-15 VITALS
BODY MASS INDEX: 38.25 KG/M2 | HEART RATE: 67 BPM | OXYGEN SATURATION: 98 % | DIASTOLIC BLOOD PRESSURE: 80 MMHG | TEMPERATURE: 98.3 F | WEIGHT: 229.6 LBS | RESPIRATION RATE: 20 BRPM | HEIGHT: 65 IN | SYSTOLIC BLOOD PRESSURE: 122 MMHG

## 2025-05-15 DIAGNOSIS — L98.9 SKIN LESION: ICD-10-CM

## 2025-05-15 DIAGNOSIS — M79.645 BILATERAL THUMB PAIN: Primary | ICD-10-CM

## 2025-05-15 DIAGNOSIS — R53.83 OTHER FATIGUE: ICD-10-CM

## 2025-05-15 DIAGNOSIS — M79.644 BILATERAL THUMB PAIN: Primary | ICD-10-CM

## 2025-05-15 DIAGNOSIS — Z79.899 MEDICATION MANAGEMENT: ICD-10-CM

## 2025-05-15 DIAGNOSIS — R63.5 WEIGHT GAIN: ICD-10-CM

## 2025-05-15 LAB
ERYTHROCYTE [DISTWIDTH] IN BLOOD BY AUTOMATED COUNT: 13.1 % (ref 10–15)
ERYTHROCYTE [SEDIMENTATION RATE] IN BLOOD BY WESTERGREN METHOD: 7 MM/HR (ref 0–20)
HCT VFR BLD AUTO: 39.5 % (ref 35–47)
HGB BLD-MCNC: 13.1 G/DL (ref 11.7–15.7)
MCH RBC QN AUTO: 30.9 PG (ref 26.5–33)
MCHC RBC AUTO-ENTMCNC: 33.2 G/DL (ref 31.5–36.5)
MCV RBC AUTO: 93 FL (ref 78–100)
PLATELET # BLD AUTO: 310 10E3/UL (ref 150–450)
RBC # BLD AUTO: 4.24 10E6/UL (ref 3.8–5.2)
WBC # BLD AUTO: 6.3 10E3/UL (ref 4–11)

## 2025-05-15 RX ORDER — NORETHINDRONE 0.35 MG/1
1 TABLET ORAL
COMMUNITY
Start: 2025-05-08

## 2025-05-15 ASSESSMENT — PAIN SCALES - GENERAL: PAINLEVEL_OUTOF10: MODERATE PAIN (6)

## 2025-05-15 NOTE — PROGRESS NOTES
Assessment & Plan     (M79.644,  M79.645) Bilateral thumb pain  (primary encounter diagnosis)  Comment: uncertain etiology  Plan: ESR: Erythrocyte sedimentation rate, CRP,         inflammation, XR Hand Bilateral G/E 3 Views        R/o inflammatory source, labs today      (R63.5) Weight gain  (R53.83) Other fatigue  Comment: h/o hashimotos, current not on meds  Plan: TSH with free T4 reflex        Replace if indicated based on labs    (L98.9) Skin lesion  Comment: uncertain etiology  Plan: return for punch biopsy r/o psoriasis    The longitudinal plan of care for the diagnosis(es)/condition(s) as documented were addressed during this visit. Due to the added complexity in care, I will continue to support Deni in the subsequent management and with ongoing continuity of care.          Leora Cheema is a 44 year old, presenting for the following health issues:  Musculoskeletal Problem      5/15/2025    11:15 AM   Additional Questions   Roomed by Ike     Musculoskeletal Problem    History of Present Illness       Reason for visit:  Pain in thumbs and toes, bumps on elbow-turns to callous,  Symptom onset:  3-4 weeks ago  Symptoms include:  Pain in thumb- bilateral, Toes-bilateral, joint swelling, fatigue- falling asleep on the couch by 6:00, weight gain  Symptom intensity:  Moderate  Symptom progression:  Worsening  Prior treatment description:  Sees Rheumatology. Scheduled for August  What makes it better:  Second dose of nabumatone occasionally helps, heat, ice   She is taking medications regularly.      Sudden onset of left thumb stabbing pain at CMC joint that became a constant pain at rest and sharpens with movement of joint.   Then a few days later similar pain in right thumb.   strength is less.    Also has had pointer finger cmc joint pain, started as intermittent, now constant and getting intermittent swelling.     She also has noticed an area on her left elbow that started out as papular lesions  "then coalesced into a thickened scaly lesion.  It itches.  She pumiced it down and within a few days the lesions started to recur very remaining only on her left elbow she has no other areas of concern.    She continues to have chronic joint pain and swelling.  She missed her appointment in for rheumatology in April and is rescheduled for August.    She continues to have fatigue as well as weight gain despite not changing her eating habits or activity level.  She has gained 10 pounds since her last visit in March.  She has a history of Hashimoto's thyroiditis was on levothyroxine low dose for several years and then weaned off.            Review of Systems  Constitutional, HEENT, cardiovascular, pulmonary, gi and gu systems are negative, except as otherwise noted.      Objective    /80   Pulse 67   Temp 98.3  F (36.8  C) (Oral)   Resp 20   Ht 1.651 m (5' 5\")   Wt 104.1 kg (229 lb 9.6 oz)   LMP 04/15/2025 (Approximate)   SpO2 98%   BMI 38.21 kg/m    Body mass index is 38.21 kg/m .  Physical Exam   GENERAL: alert and no distress  EYES: Eyes grossly normal to inspection, PERRL and conjunctivae and sclerae normal  MS: extremities normal- no gross deformities noted, bilateral hands with + ttp over cmc joints of thumbs and pointer fingers.  Mild edema of pointer fingers  SKIN: 2 cm raised patch of erythema with scaling on the surface  PSYCH: mentation appears normal, affect normal/bright, fatigued, and frustrated    Xray: bilateral hands - possible decreased joint space of 1st CMC joint, o/w normal hands.  personally reviewed during visit, await radiology review           Signed Electronically by: Radha Lay MD    "

## 2025-05-21 ENCOUNTER — OFFICE VISIT (OUTPATIENT)
Dept: FAMILY MEDICINE | Facility: CLINIC | Age: 45
End: 2025-05-21
Payer: COMMERCIAL

## 2025-05-21 VITALS
HEART RATE: 68 BPM | DIASTOLIC BLOOD PRESSURE: 78 MMHG | OXYGEN SATURATION: 97 % | HEIGHT: 65 IN | TEMPERATURE: 97.6 F | RESPIRATION RATE: 16 BRPM | SYSTOLIC BLOOD PRESSURE: 116 MMHG | BODY MASS INDEX: 37.99 KG/M2 | WEIGHT: 228 LBS

## 2025-05-21 DIAGNOSIS — D48.5 NEOPLASM OF UNCERTAIN BEHAVIOR OF SKIN OF ELBOW: Primary | ICD-10-CM

## 2025-05-21 PROCEDURE — 11104 PUNCH BX SKIN SINGLE LESION: CPT | Performed by: FAMILY MEDICINE

## 2025-05-21 ASSESSMENT — PAIN SCALES - GENERAL: PAINLEVEL_OUTOF10: MODERATE PAIN (6)

## 2025-05-21 NOTE — PROGRESS NOTES
"  Assessment & Plan     (D48.5) Neoplasm of uncertain behavior of skin of elbow  (primary encounter diagnosis)  Comment: uncertain skin lesion  Plan: LA PUNCH BIOPSY OF SKIN, FIRST/SINGLE LESION,         lidocaine-EPINEPHrine (PF) 1 %-1:[id number]         injection 0.5 mL, Surgical Pathology Exam        After informed written consent was obtained, using Betadine for cleansing and 1% Lidocaine with epinephrine for anesthetic, with sterile technique a 3 mm punch biopsy was used to obtain a biopsy specimen of the lesion. Hemostasis was obtained by pressure and wound was sutured with a single 4'0 ethilon. Antibiotic dressing is applied, and wound care instructions provided. Be alert for any signs of cutaneous infection. The specimen is labeled and sent to pathology for evaluation. The procedure was well tolerated without complications.     Suture removal in 7 days            Leora Cheema is a 44 year old, presenting for the following health issues:  Biopsy (LFT elbow )        5/21/2025     1:42 PM   Additional Questions   Roomed by Ganesh   Accompanied by Self         5/21/2025     1:42 PM   Patient Reported Additional Medications   Patient reports taking the following new medications None     History of Present Illness       Reason for visit:  Pain in thumbs and toes   She is taking medications regularly.        Pt returns for biopsy of skin lesion on left elbow. See OV 5/15/2025 for full hx of rash        Review of Systems  Constitutional, HEENT, cardiovascular, pulmonary, gi and gu systems are negative, except as otherwise noted.      Objective    /78   Pulse 68   Temp 97.6  F (36.4  C) (Tympanic)   Resp 16   Ht 1.651 m (5' 5\")   Wt 103.4 kg (228 lb)   LMP 04/15/2025 (Approximate)   SpO2 97%   BMI 37.94 kg/m    Body mass index is 37.94 kg/m .  Physical Exam   GENERAL: alert and no distress  EYES: Eyes grossly normal to inspection, PERRL and conjunctivae and sclerae normal  MS: no gross " musculoskeletal defects noted, no edema  SKIN: left elbow with patch of scaly skin, + excoriations.  PSYCH: mentation appears normal, affect normal/bright            Signed Electronically by: Radha Lay MD

## 2025-05-27 DIAGNOSIS — N92.1 MENORRHAGIA WITH IRREGULAR CYCLE: Primary | ICD-10-CM

## 2025-05-27 RX ORDER — NORETHINDRONE 0.35 MG/1
0.35 TABLET ORAL
Qty: 84 TABLET | Refills: 3 | Status: SHIPPED | OUTPATIENT
Start: 2025-05-27

## 2025-05-27 NOTE — TELEPHONE ENCOUNTER
Spoke with patient as requested by refill team:    Pres[facility]    Clinic RN: Please investigate patient's chart or contact patient if the information cannot be found because the medication is listed as historical or discontinued. Confirm patient is taking this medication. Document findings and route refill encounter to provider for approval or denial.     Patient reports that she just picked up prescription, has a 3 month supply but will need a new order once she is out. Routing to provider, please review and advise. RN did update notes on prescription that patient will contact them when she is ready for the refill. Carlos Manuel Rome, RN, BSN, PHN

## 2025-06-04 LAB
PATH REPORT.COMMENTS IMP SPEC: NORMAL
PATH REPORT.FINAL DX SPEC: NORMAL
PATH REPORT.GROSS SPEC: NORMAL
PATH REPORT.MICROSCOPIC SPEC OTHER STN: NORMAL
PATH REPORT.RELEVANT HX SPEC: NORMAL
PHOTO IMAGE: NORMAL

## 2025-06-10 ENCOUNTER — RESULTS FOLLOW-UP (OUTPATIENT)
Dept: FAMILY MEDICINE | Facility: CLINIC | Age: 45
End: 2025-06-10

## 2025-06-10 ENCOUNTER — OFFICE VISIT (OUTPATIENT)
Dept: FAMILY MEDICINE | Facility: CLINIC | Age: 45
End: 2025-06-10
Payer: COMMERCIAL

## 2025-06-10 VITALS
HEART RATE: 76 BPM | BODY MASS INDEX: 38.82 KG/M2 | SYSTOLIC BLOOD PRESSURE: 120 MMHG | TEMPERATURE: 98.3 F | OXYGEN SATURATION: 95 % | DIASTOLIC BLOOD PRESSURE: 76 MMHG | HEIGHT: 65 IN | WEIGHT: 233 LBS

## 2025-06-10 DIAGNOSIS — G89.29 CHRONIC LEFT SHOULDER PAIN: Primary | ICD-10-CM

## 2025-06-10 DIAGNOSIS — L60.8 CHANGE IN NAIL APPEARANCE: ICD-10-CM

## 2025-06-10 DIAGNOSIS — H01.139 EYELID ECZEMA, UNSPECIFIED LATERALITY: ICD-10-CM

## 2025-06-10 DIAGNOSIS — M25.512 CHRONIC LEFT SHOULDER PAIN: Primary | ICD-10-CM

## 2025-06-10 DIAGNOSIS — L28.0 LICHEN SIMPLEX CHRONICUS: ICD-10-CM

## 2025-06-10 PROCEDURE — 99214 OFFICE O/P EST MOD 30 MIN: CPT | Performed by: FAMILY MEDICINE

## 2025-06-10 PROCEDURE — G2211 COMPLEX E/M VISIT ADD ON: HCPCS | Performed by: FAMILY MEDICINE

## 2025-06-10 RX ORDER — CLOBETASOL PROPIONATE 0.5 MG/G
CREAM TOPICAL 2 TIMES DAILY
Qty: 30 G | Refills: 0 | Status: SHIPPED | OUTPATIENT
Start: 2025-06-10

## 2025-06-10 RX ORDER — TRIAMCINOLONE ACETONIDE 1 MG/G
CREAM TOPICAL 2 TIMES DAILY
Qty: 10 G | Refills: 2 | Status: SHIPPED | OUTPATIENT
Start: 2025-06-10

## 2025-06-10 ASSESSMENT — PAIN SCALES - GENERAL: PAINLEVEL_OUTOF10: MODERATE PAIN (5)

## 2025-06-10 NOTE — PROGRESS NOTES
Assessment & Plan     (M25.512,  G89.29) Chronic left shoulder pain  (primary encounter diagnosis)  Comment: likely rotator cuff pain  Plan: Physical Therapy  Referral        Refer to PT, if no improvement in 4-6 weeks, consider MRI  Letter for work limitations    (H01.139) Eyelid eczema, unspecified laterality  Comment: mild  Plan: triamcinolone (KENALOG) 0.1 % external cream        Use sparingly    (L28.0) Lichen simplex chronicus  Comment: left elbow   Plan: clobetasol propionate (TEMOVATE) 0.05 %         external cream        Use sparingly for flares    (L60.8) Change in nail appearance  Comment: uncertain etiology  Plan: Adult Dermatology  Referral            The longitudinal plan of care for the diagnosis(es)/condition(s) as documented were addressed during this visit. Due to the added complexity in care, I will continue to support Deni in the subsequent management and with ongoing continuity of care.            Leora Cheema is a 44 year old, presenting for the following health issues:  Letter for School/Work      6/10/2025    10:31 AM   Additional Questions   Roomed by jose     History of Present Illness       Reason for visit:  A medical note for work, and worsening shoulder and pelvic/lower back pain   She is taking medications regularly.      Left shoulder pain for years, worsening lately, awakening her at night.  Had to do walking route for [employer] and the bag strap aggrevated the pain. Pain all the time, worsens with rotational movement of shoulder. Ice and tylenol decrease the pain.  No previous eval/treatment.  Gradual onset over the years, with episodic pain, over past 2 months is constant.    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  Worsened with the walking route.    Requests letter to avoid walking routes as the bag worsens shoulder pain, walking worsens pelvic pain and holding the mail in her hand worsens her thumb pain    Reviewed  "recent biopsy results - lichen simplex chronicus    Has noted nail changes with discoloration and divot at the base of her nails on most fingers/thumb.  Recent thyroid testing is normal.                Review of Systems  Constitutional, HEENT, cardiovascular, pulmonary, gi and gu systems are negative, except as otherwise noted.      Objective    /76   Pulse 76   Temp 98.3  F (36.8  C)   Ht 1.651 m (5' 5\")   Wt 105.7 kg (233 lb)   LMP  (LMP Unknown)   SpO2 95%   BMI 38.77 kg/m    Body mass index is 38.77 kg/m .  Physical Exam   GENERAL: alert and no distress  EYES: Eyes grossly normal to inspection, PERRL and conjunctivae and sclerae normal  MS: no gross musculoskeletal defects noted, no edema, left shoulder with no ttp, + empty can and drop arm test.  FROM though painful.    SKIN: no suspicious lesions or rashes, discoloration and indentation of nail at base of nails of most fingers and both thumbs,  PSYCH: mentation appears normal, affect normal/bright            Signed Electronically by: Radha Lay MD    "

## 2025-06-10 NOTE — LETTER
Luciana 10, 2025      Deni Zimmerough  [id number] [address] [facility] [id number]        To Whom It May Concern,     Deni Simental,1980 has a condition that limits her ability to work more than a 5 day work week.  She cannot work more than 5 days per work week until January 31, 2026.     She cannot work walking routes due to her shoulder condition for the next 6 months.     Thank you for your consideration in this matter,       Sincerely,        Radha Lay MD    Electronically signed

## 2025-06-11 ENCOUNTER — PATIENT OUTREACH (OUTPATIENT)
Dept: CARE COORDINATION | Facility: CLINIC | Age: 45
End: 2025-06-11
Payer: COMMERCIAL

## 2025-06-17 ENCOUNTER — MYC MEDICAL ADVICE (OUTPATIENT)
Dept: FAMILY MEDICINE | Facility: CLINIC | Age: 45
End: 2025-06-17
Payer: COMMERCIAL

## 2025-06-17 DIAGNOSIS — T63.461A ANAPHYLACTIC REACTION TO WASP STING, ACCIDENTAL OR UNINTENTIONAL, INITIAL ENCOUNTER: Primary | ICD-10-CM

## 2025-06-17 DIAGNOSIS — T78.2XXA ANAPHYLACTIC REACTION TO WASP STING, ACCIDENTAL OR UNINTENTIONAL, INITIAL ENCOUNTER: Primary | ICD-10-CM

## 2025-06-17 RX ORDER — EPINEPHRINE 0.3 MG/.3ML
0.3 INJECTION SUBCUTANEOUS PRN
Qty: 2 EACH | Refills: 1 | Status: SHIPPED | OUTPATIENT
Start: 2025-06-17

## 2025-06-17 NOTE — TELEPHONE ENCOUNTER
Provider:  Please see [employer] message from the patient. This medication is not on her current medication list but the allergy to wasps is. Are you willing to send this Prescription(s)? Last seen 6/10/2025.  Thank you. Kaycee Ansari R.N.

## 2025-06-25 DIAGNOSIS — M79.642 PAIN IN BOTH HANDS: ICD-10-CM

## 2025-06-25 DIAGNOSIS — M79.641 PAIN IN BOTH HANDS: ICD-10-CM

## 2025-06-25 NOTE — TELEPHONE ENCOUNTER
Clinic RN: Please investigate patient's chart or contact patient if the information cannot be found because patient should have run out of this medication on 1/21/25. Confirm patient is taking this medication as prescribed. Document findings and route refill encounter to provider for approval or denial.    Nellie Biggs, RN on 6/25/2025 at 7:29 AM

## 2025-06-25 NOTE — TELEPHONE ENCOUNTER
Pt states she has been taking this daily.  The sig did not get changed.  She only takes one a day but can take a second one if needed.    To provider to update sig.  Mary CUELLARN, RN

## 2025-07-02 ENCOUNTER — TELEPHONE (OUTPATIENT)
Dept: NEUROLOGY | Facility: CLINIC | Age: 45
End: 2025-07-02
Payer: COMMERCIAL

## 2025-07-02 NOTE — TELEPHONE ENCOUNTER
Called patient to advise on rescheduling appt for Botox. Advised patient that it could take up to 30 days to receive answer from insurance. Advised patient that if we hear something sooner I will reach out to the patient and try to get her in sooner.     Patient understands.     Codi Livingston MA

## 2025-07-02 NOTE — TELEPHONE ENCOUNTER
Returned patient call regarding Botox appt. Patient requesting new date and time. Changed patient appt to 8/8. Advised patient to contact clinic if any other questions or changes.     Patient understands.       Codi Livingston MA

## 2025-07-02 NOTE — TELEPHONE ENCOUNTER
[facility] Call Center    Phone Message    May a detailed message be left on voicemail: yes     Reason for Call: Other: Patient would like to reschedule her P BOTOX - STANDARD MIGRAINE [appointment  from 08/15 to 08/08. Patient can be reached at .     Action Taken: Message routed to:  Other: cs neurology    Travel Screening: Not Applicable

## 2025-07-14 ASSESSMENT — ACTIVITIES OF DAILY LIVING (ADL)
PUTTING_ON_AN_UNDERSHIRT_OR_A_PULLOVER_SWEATER: 5
WASHING_YOUR_HAIR?: 6
CARRYING_A_HEAVY_OBJECT_OF_10_POUNDS: 8
REMOVING_SOMETHING_FROM_YOUR_BACK_POCKET: 1
PLEASE_INDICATE_YOR_PRIMARY_REASON_FOR_REFERRAL_TO_THERAPY:: SHOULDER
PUSHING_WITH_THE_INVOLVED_ARM: 8
PLACING_AN_OBJECT_ON_A_HIGH_SHELF: 2
AT_ITS_WORST?: 9
WASHING_YOUR_BACK: 4
TOUCHING_THE_BACK_OF_YOUR_NECK: 1
WHEN_LYING_ON_THE_INVOLVED_SIDE: 10
REACHING_FOR_SOMETHING_ON_A_HIGH_SHELF: 3
PUTTING_ON_YOUR_PANTS: 4

## 2025-07-15 ENCOUNTER — THERAPY VISIT (OUTPATIENT)
Dept: PHYSICAL THERAPY | Facility: CLINIC | Age: 45
End: 2025-07-15
Attending: FAMILY MEDICINE
Payer: COMMERCIAL

## 2025-07-15 DIAGNOSIS — G89.29 CHRONIC RIGHT SHOULDER PAIN: ICD-10-CM

## 2025-07-15 DIAGNOSIS — R29.3 POOR POSTURE: ICD-10-CM

## 2025-07-15 DIAGNOSIS — M75.42 ROTATOR CUFF IMPINGEMENT SYNDROME OF LEFT SHOULDER: ICD-10-CM

## 2025-07-15 DIAGNOSIS — M25.511 CHRONIC RIGHT SHOULDER PAIN: ICD-10-CM

## 2025-07-15 DIAGNOSIS — G89.29 CHRONIC LEFT SHOULDER PAIN: Primary | ICD-10-CM

## 2025-07-15 DIAGNOSIS — M25.512 CHRONIC LEFT SHOULDER PAIN: Primary | ICD-10-CM

## 2025-07-15 PROCEDURE — 97161 PT EVAL LOW COMPLEX 20 MIN: CPT | Mod: GP | Performed by: PHYSICAL THERAPIST

## 2025-07-15 PROCEDURE — 97110 THERAPEUTIC EXERCISES: CPT | Mod: GP | Performed by: PHYSICAL THERAPIST

## 2025-07-15 NOTE — PROGRESS NOTES
PHYSICAL THERAPY EVALUATION  Type of Visit: Evaluation  Patient presents with signs and symptoms consistent with chronic L>R shoulder pain secondary to possible rotator cuff impingement and inflammation. Patient demonstrates impairments including decreased L shoulder AROM with increased P, poor postural awareness and stability, with + impingement signs. Patient with functional limitations including carrying and lifting, working without restrictions, cooking/cleaning, and sleeping comfortably at night. Patient would benefit from skilled PT to progress and improve impairments and concerns.       Fall Risk Screen:  Have you fallen 2 or more times in the past year?: Yes  Have you fallen and had an injury in the past year?: Yes  Is patient receiving Physical Therapy Services?: Yes  Fall screen comments: couldn't catch herself from falling/rolling her ankle    Subjective     Chronic L shoulder pain that hurts with sitting, started 8-9 years ago and it used to come and go, did shoot down her arm, now it is a constant pain        Presenting condition or subjective complaint: Left shoulder pain has increased over the last several months  Date of onset: 06/10/25    Relevant medical history: Arthritis; Migraines or headaches; Overweight; Pain at night or rest; Severe headaches; Thyroid problems   Past Medical History:   Diagnosis Date    Allergy     multiple    Ankle sprain 04/2010    Arthritis     Depression     in teens -     Hashimoto's thyroiditis     Uncomplicated asthma      Dates & types of surgery: Dolph tooth removal about 16 years ago   Past Surgical History:   Procedure Laterality Date    HC TOOTH EXTRACTION W/FORCEP         Prior diagnostic imaging/testing results:       Prior therapy history for the same diagnosis, illness or injury: No  - did see chiro for migraines for a little while    Living Environment  Social support: With a significant other or spouse   Type of home: House   Stairs to enter the home: Yes 2  Is there a railing: Yes     Ramp: No   Stairs inside the home: Yes 15 Is there a railing: Yes     Help at home: None  Equipment owned:       Employment: Yes    Hobbies/Interests: Stephanie, reading, knitting, sewing, baking    Patient goals for therapy: Exist with little pain!    Pain assessment: Pain present  See objective evaluation for additional pain details     Objective   Pain Location: L shoulder right at top of shoulder joint, tender to palpation, R shoulder same spot  Pain Quality: dull achy pain most of the time but can get sharp shooting pains - hands go numb and tingly   Pain Frequency: always there   Pain is Worst: end of day is worse and sleeping at night bothers her  Pain is Exacerbated By: carrying/lifting, sleeping on that side,   Pain is Relieved By: med sometimes helps but otherwise nothing really, heating pad can help  Pain Progression: things have been getting worse, now feeling into R shoulder too    SHOULDER EVALUATION    POSTURE: Standing Posture: Rounded shoulders, Forward head  Sitting Posture: Rounded shoulders, Forward head    ROM:   L shoulder flexion 160, abduction 145P, ER 82P, IR restricted but no P   R shoulder flexion 160, abduction 160, ER 82P, IR restricted with P     FLEXIBILITY: decreased pectorals bilaterally  Special Tests: + empty can and impingement signs in lateral L shoulder  PALPATION: TTP at anterior shoulder joint L>R, as well as UT and levators  JOINT MOBILITY: end range restrictions L>R with some inc P  CERVICAL SCREEN: chronic migraines       Assessment & Plan   CLINICAL IMPRESSIONS  Medical Diagnosis: Chronic L shoulder pain    Treatment Diagnosis: Bilateral shoulder pain   Impression/Assessment: Patient is a 44 year old female with L shoulder pain complaints.  The following significant findings have been identified: Pain, Decreased ROM/flexibility, Decreased joint mobility, Decreased activity tolerance, and Impaired posture. These impairments interfere with their  ability to perform self care tasks, work tasks, recreational activities, household chores, driving , household mobility, and community mobility as compared to previous level of function.     Clinical Decision Making (Complexity):  Clinical Presentation: Stable/Uncomplicated  Clinical Presentation Rationale: based on medical and personal factors listed in PT evaluation  Clinical Decision Making (Complexity): Low complexity    PLAN OF CARE  Treatment Interventions:  Interventions: Manual Therapy, Neuromuscular Re-education, Therapeutic Activity, Therapeutic Exercise, Self-Care/Home Management    Long Term Goals     PT Goal 1  Goal Identifier: carrying  Goal Description: Patient will be able to carry 10lb of mail in her carrier bag without increased L shoulder symptoms or difficulty in order to return to work without restriction while walking.  Goal Progress: restricted from walking routes  Target Date: 08/29/25  PT Goal 2  Goal Identifier: sleeping  Goal Description: Patient will be able to improve sleep to be able to sleep in whichever position she would like without waking up with inc P symptoms at least 5 nights of the week in order to restore sleep pattern  Goal Progress: waking up every night with inc P  Target Date: 10/03/25      Frequency of Treatment: 1x/week  Duration of Treatment: 10 weeks      Risks and benefits of evaluation/treatment have been explained.   Patient/Family/caregiver agrees with Plan of Care.     Evaluation Time:     PT Eval, Low Complexity Minutes (94133): 15     Signing Clinician: DIAZ DANGELO PT

## 2025-07-30 ENCOUNTER — OFFICE VISIT (OUTPATIENT)
Dept: RHEUMATOLOGY | Facility: CLINIC | Age: 45
End: 2025-07-30
Payer: COMMERCIAL

## 2025-07-30 ENCOUNTER — ANCILLARY PROCEDURE (OUTPATIENT)
Dept: GENERAL RADIOLOGY | Facility: CLINIC | Age: 45
End: 2025-07-30
Attending: INTERNAL MEDICINE
Payer: COMMERCIAL

## 2025-07-30 VITALS
WEIGHT: 248 LBS | HEART RATE: 59 BPM | OXYGEN SATURATION: 94 % | BODY MASS INDEX: 41.27 KG/M2 | SYSTOLIC BLOOD PRESSURE: 115 MMHG | RESPIRATION RATE: 16 BRPM | DIASTOLIC BLOOD PRESSURE: 75 MMHG

## 2025-07-30 DIAGNOSIS — R76.8 ANA POSITIVE: ICD-10-CM

## 2025-07-30 DIAGNOSIS — M25.50 MULTIPLE JOINT PAIN: ICD-10-CM

## 2025-07-30 DIAGNOSIS — M25.50 MULTIPLE JOINT PAIN: Primary | ICD-10-CM

## 2025-07-30 LAB — B BURGDOR IGG+IGM SER QL: 0.12

## 2025-07-30 PROCEDURE — 87340 HEPATITIS B SURFACE AG IA: CPT | Performed by: INTERNAL MEDICINE

## 2025-07-30 PROCEDURE — 86225 DNA ANTIBODY NATIVE: CPT | Performed by: INTERNAL MEDICINE

## 2025-07-30 PROCEDURE — 86160 COMPLEMENT ANTIGEN: CPT | Performed by: INTERNAL MEDICINE

## 2025-07-30 PROCEDURE — 86200 CCP ANTIBODY: CPT | Performed by: INTERNAL MEDICINE

## 2025-07-30 PROCEDURE — 99204 OFFICE O/P NEW MOD 45 MIN: CPT | Performed by: INTERNAL MEDICINE

## 2025-07-30 PROCEDURE — 72200 X-RAY EXAM SI JOINTS: CPT | Mod: TC | Performed by: RADIOLOGY

## 2025-07-30 PROCEDURE — 86803 HEPATITIS C AB TEST: CPT | Performed by: INTERNAL MEDICINE

## 2025-07-30 PROCEDURE — 36415 COLL VENOUS BLD VENIPUNCTURE: CPT | Performed by: INTERNAL MEDICINE

## 2025-07-30 PROCEDURE — 86704 HEP B CORE ANTIBODY TOTAL: CPT | Performed by: INTERNAL MEDICINE

## 2025-07-30 PROCEDURE — 86431 RHEUMATOID FACTOR QUANT: CPT | Performed by: INTERNAL MEDICINE

## 2025-07-30 PROCEDURE — 86618 LYME DISEASE ANTIBODY: CPT | Performed by: INTERNAL MEDICINE

## 2025-07-30 PROCEDURE — 72100 X-RAY EXAM L-S SPINE 2/3 VWS: CPT | Mod: TC | Performed by: RADIOLOGY

## 2025-07-30 NOTE — PATIENT INSTRUCTIONS
RHEUMATOLOGY    [facility] [place]  [address] [place] [address]  [place] [address]    Phone number: [phone]  Fax number: [phone]    If you need a medication refill, please contact us as you may need lab work and/or a follow up visit prior to your refill.      Thank you for choosing [facility]!    Leta Jeffers CMA Rheumatology

## 2025-07-30 NOTE — PROGRESS NOTES
Rheumatology Clinic Visit      Deni Simental MRN# [id number]   YOB: 1980 Age: 44 year old      Date of visit: 7/30/25   PCP: Dr. Radha Lay    Chief Complaint   Patient presents with:  Consult: Hands, shoulders, elbows, back and big toes hurt all the time. Swelling in hands and big toes. Dad has psoriatic arthritis.    Assessment and Plan     1.  Multiple joint pain: Patient reports swelling and tenderness across the MCPs and PIPs that is worse in the morning improves with time and activity, but also with pain at several other areas including her low back.  On exam, she is diffusely tender across all MCPs, PIPs, DIPs, wrists, elbows, shoulders, ankles, and MTPs.  No active synovitis seen on exam.  She also has low back pain with tenderness at the lumbar spine and over each SI joint.  Symptoms have mixed inflammatory and degenerative qualities.  Patient also reports having celiac disease and is on a strict gluten-free diet.  She recalls being on prednisone in the past for headaches but it is not clear if prednisone resulted in any benefit with regard to her joints.  She has a positive MARISELA but low suspicion for an MARISELA-associated rheumatologic disease; see #2.  Further workup as noted below.  Depending on results may need an MRI of the pelvis to assess for sacroiliitis.  Note that she has a father with psoriatic arthritis.  Also depending on results may need prednisone trial to assess for reversible inflammation and this was discussed in detail today.  If an MRI is needed, the patient notes that she does not have claustrophobia, does not have a pacemaker, does not have a cochlear implant, and does not have any metal implants. Undiagnosed new problem with uncertain prognosis   - Labs today: RF, CCP, Lyme, Hepatitis B core antibody and surface antigen, hepatitis C antibody, dsDNA, C3, C4  - X-rays today: Lumbar spine, SI joints, bilateral hands/feet    # Prednisone Risks and Benefits: The risks  and benefits of prednisone were discussed in detail and the patient verbalized understanding.  The risks discussed include, but are not limited to, weight gain, fluid retention, impaired wound healing, hyperglycemia, adrenal suppression, GI upset, peptic ulcer, hepatotoxicity, aseptic necrosis of the femoral and humeral heads, osteoporosis, myopathy, tendon rupture (particularly Achilles tendon), ocular changes including an increased intraocular pressure.  I encouraged reviewing the package insert and asking any questions about the medication.      2.  Positive MARISELA: Previously with negative dsDNA, Dotson, SSA, SSB, Avani-1, Scl-70; and normal complement C3 and C4.  Other than joint pain she does not have other features to suggest an MARISELA-associated rheumatologic disease.  She does have Hashimoto's thyroiditis history with positive thyroglobulin antibodies, and possibly this is the etiology of the positive MARISELA.  Recheck C3, C4, and dsDNA today considering joint pains.    3.  Suspected celiac disease: Patient reports that this is not a formal diagnosis but she has 1 child with a formal diagnosis of celiac disease and the other child with suspected celiac disease.  Worsening joint pain and GI symptoms with any gluten ingestion.  She has a strict gluten-free diet.    Total minutes spent in evaluation with patient, documentation, , and review of pertinent studies and chart notes: 46     Ms. Simental verbalized agreement with and understanding of the rational for the diagnosis and treatment plan.  All questions were answered to best of my ability and the patient's satisfaction. Ms. Simental was advised to contact the clinic with any questions that may arise after the clinic visit.      Thank you for involving me in the care of the patient    Return to clinic: 6 weeks    HPI   Bonitaplacido Simental is a 44 year old female with a past medical history significant for Hashimoto's thyroiditis with positive  "thyroglobulin antibody, alopecia, migraines, fatigue, ADHD, impingement syndrome of the left shoulder and positive MARISELA who presents for initial rheumatology evaluation in this clinic.    10/21/2021 [facility] rheumatology note by Dr. Juarez Khanna documents de Quervain's tenosynovitis, MARISELA 1: 1280 speckled, negative RF, CCP normal,, arthralgia of both hands.  Suspect MARISELA could be positive due to autoimmune thyroid disease.  Sicca symptoms present.  Gluten-free and has symptoms of possible celiac disease; son has celiac disease.  Father with psoriatic arthritis.  Exam consistent with de Quervain's tenosynovitis    4/29/2019 MARISELA negative  4/29/2019 Thyroid peroxidase antibody negative  4/29/2019 thyroglobulin antibody positive  4/29/2019 TTG negative  4/29/2019 RF negative    8/16/2021 RF negative, Lyme negative, hemoglobin A1c 5.4, CBC normal, MARISELA 1: 1280 speckled    10/21/2021 CRP normal, negative Avani-1, Scl-70, histone, HLA-B27, RNP, Smith, SSA, SSB, CCP, dsDNA; normal complement C3 and C4    2/28/2023 CCP negative, RF negative, dsDNA negative, MARISELA 1: 160 speckled, uric acid 4.8, normal creatinine    5/15/2025 ESR and CRP normal    5/21/2025 left elbow pathology report: \"Most consistent with lichen simplex chronicus\", and \"The specimen exhibits scale crust and hyperkeratosis, mild irregular psoriasiform epidermal hyperplasia with hypergranulosis and intermittent mild spongiosis, papillary dermal fibrosis, telangiectasia and a mild superficial and mid perivascular lymphocytic infiltrate.   immunostain fails to demonstrate a significant plasmacytoid dendritic cell infiltrate, arguing against a cutaneous lupus erythematosus variant\"    Today, 7/30/2025: pain at her MCPs, PIPs that is worse in the morning and associated with swelling, better with time and activity.  Other joints involved are that the DIPs that are present all day long, not better with time or activity.  Also pain at the shoulders that is present all " day, not better or worse based on time of day or activity.  Also with migraines, Hashimoto's thyroiditis not requiring treatment, and fatigue.  Cannot do daily activities such as chopping food with a knife because of pain in the hands.  Has suspected celiac disease and therefore is gluten-free.  She has 2 children with celiac disease as well.  Whenever she consumes gluten she has worsening joint pain.  She says she has a strict gluten-free diet.  Frail fingernails so she puts a fingernail hardening coating on them.  Also with Beau lines.  No nail pitting.  Chronic low back pain that is worse in the morning and and improves with time and activity.  At one point states that she used prednisone for migraines that did not improve her joint symptoms but later she says she did not pay much attention to her joint symptoms during that time while she was on prednisone    Denies fevers, chills, nausea, vomiting.  Alternating constipation and diarrhea.  No black or bloody stools.  No hematuria.  No abdominal pain. No chest pain/pressure, palpitations, or shortness of breath. No LE swelling. No neck pain. No oral or nasal sores.  No rash.  Mild dry eyes and dry mouth treated with frequent sips of water but not requiring artificial tears.  No photosensitivity or photophobia.  No history of inflammatory eye or bowel disease.  No history of DVT, pulmonary embolism, or miscarriage.   No history of serositis.  No history of Raynaud's Phenomenon.      Tobacco: None  EtOH: None  Drugs: None    ROS   12 point review of system was completed and negative except as noted in the HPI     Active Problem List     Patient Active Problem List   Diagnosis    Migraine with aura and without status migrainosus, not intractable    Hashimoto's thyroiditis    Thyroglobulin antibody positive    Alopecia    Fatigue, unspecified type    Poor sleep hygiene    Thyroid nodule    Adult ADHD    Chronic left shoulder pain    Rotator cuff impingement syndrome  of left shoulder    Chronic right shoulder pain    Poor posture     Past Medical History     Past Medical History:   Diagnosis Date    Allergy     multiple    Ankle sprain 04/2010    Arthritis     Depression     in teens -     Hashimoto's thyroiditis     Uncomplicated asthma      Past Surgical History     Past Surgical History:   Procedure Laterality Date    HC TOOTH EXTRACTION W/FORCEP       Allergy     Allergies   Allergen Reactions    Wasp Venom Hives     Swelling past two joints    Brodnax Flavor Hives    Red Dye #40 (Allura Red) Hives    Retin-A [Tretinoin] Hives     Current Medication List     Current Outpatient Medications   Medication Sig Dispense Refill    albuterol (PROAIR HFA/PROVENTIL HFA/VENTOLIN HFA) 108 (90 Base) MCG/ACT inhaler Inhale 1-2 puffs into the lungs every 4 hours as needed for shortness of breath, wheezing or cough. 18 g 3    albuterol (PROVENTIL) (2.5 MG/3ML) 0.083% neb solution Inhale 2.5 mg into the lungs as needed.      clobetasol propionate (TEMOVATE) 0.05 % external cream Apply topically 2 times daily. For elbow 30 g 0    divalproex sodium delayed-release (DEPAKOTE) 500 MG DR tablet Take 1 tablet (500 mg) by mouth 2 times daily. 60 tablet 5    EPINEPHrine (ANY BX GENERIC EQUIV) 0.3 MG/0.3ML injection 2-pack Inject 0.3 mLs (0.3 mg) into the muscle as needed for anaphylaxis. May repeat one time in 5-15 minutes if response to initial dose is inadequate. 2 each 1    guanFACINE (INTUNIV) 2 MG TB24 24 hr tablet Take 1 tablet (2 mg) by mouth at bedtime. 90 tablet 0    nabumetone (RELAFEN) 750 MG tablet Take 1 tablet (750 mg) by mouth 2 times daily as needed for moderate pain. 180 tablet 0    norethindrone (JENCYCLA) 0.35 MG tablet TAKE 1 TABLET BY MOUTH EVERY DAY 84 tablet 3    NURTEC 75 MG ODT tablet Place 1 tablet (75 mg) under the tongue every 48 hours. 16 tablet 5    ondansetron (ZOFRAN ODT) 4 MG ODT tab Take 1 tablet (4 mg) by mouth every 8 hours as needed for nausea. 90 tablet 3     propranolol ER (INDERAL LA) 80 MG 24 hr capsule Take 1 capsule (80 mg) by mouth daily. 90 capsule 1    triamcinolone (KENALOG) 0.1 % external cream Apply topically 2 times daily. Sparingly for breakouts 10 g 2    ubrogepant (UBRELVY) 100 MG tablet Take 1 tablet (100 mg) by mouth at onset of headache. 8 tablet 11     Current Facility-Administered Medications   Medication Dose Route Frequency Provider Last Rate Last Admin    Botulinum Toxin Type A (BOTOX) 200 units injection 150 Units  150 Units Intramuscular See Admin Instructions    150 Units at 25 1357       Current Facility-Administered Medications   Medication Dose Route Frequency Provider Last Rate Last Admin     Social History   See HPI    Family History     Family History   Problem Relation Age of Onset    Alcohol/Drug Mother          MVA alcohol related    Diabetes Father         hypoglycemia    Hypertension Father     Psoriatic Arthritis Father     Psoriasis Father     Graves' disease Sister         Graves, tumor    Hypothyroidism Paternal Grandmother     Cancer Paternal Grandfather         lung cancer    C.A.D. No family hx of     Depression No family hx of     Gastrointestinal Disease No family hx of     Heart Disease No family hx of     Lipids No family hx of     Neurologic Disorder No family hx of     Respiratory No family hx of     Glaucoma No family hx of     Macular Degeneration No family hx of     Cerebrovascular Disease No family hx of      Father: Psoriatic arthritis    Physical Exam     Temp Readings from Last 3 Encounters:   06/10/25 98.3  F (36.8  C)   25 97.6  F (36.4  C) (Tympanic)   05/15/25 98.3  F (36.8  C) (Oral)     BP Readings from Last 8 Encounters:   25 115/75   06/10/25 120/76   25 116/78   05/15/25 122/80   25 111/75   24 134/84   24 108/74   24 110/64     Pulse Readings from Last 1 Encounters:   25 59     Resp Readings from Last 1 Encounters:   25 16     Estimated body  "mass index is 41.27 kg/m  as calculated from the following:    Height as of 6/10/25: 1.651 m (5' 5\").    Weight as of this encounter: 112.5 kg (248 lb).    GEN: NAD.   HEENT:  Anicteric, noninjected sclera. No obvious external lesions of the ear and nose. Hearing intact.  CV: S1, S2. RRR. No m/r/g  PULM: No increased work of breathing. CTA bilaterally   MSK: Tenderness to palpation without swelling, increased warmth, or overlying erythema at the MCPs, PIPs, DIPs, wrists, elbows, shoulders, knees, ankles, and MTPs.  Lumbar spine tender to palpation.  SI joints tender to palpation.  Mildly tender to palpation over both trochanteric bursae.  Negative straight leg test bilaterally.  No pain with internal/external rotation of either hip.  12 fibromyalgia tender points positive.  SKIN: No rash or jaundice seen  PSYCH: Alert. Appropriate.      Labs / Imaging (select studies)     RF/CCP  Recent Labs   Lab Test 02/28/23  1508 04/29/19  0950   CCPIGG 1.7  --    RHF <7 <20     MARISELA  Recent Labs   Lab Test 02/28/23  1508 04/29/19  0950   BENY Positive* Negative   ANAP1 Speckled  --    ANAT1 1:160  --      dsDNA  Recent Labs   Lab Test 02/28/23  1508   DNA 1.8     CBC  Recent Labs   Lab Test 05/15/25  1153 04/28/23  1123 02/28/23  1508   WBC 6.3 5.0 7.9   RBC 4.24 4.84 4.86   HGB 13.1 13.1 12.8   HCT 39.5 39.7 39.0   MCV 93 82 80   RDW 13.1 14.6 14.6    401 469*   MCH 30.9 27.1 26.3*   MCHC 33.2 33.0 32.8   NEUTROPHIL  --   --  60   LYMPH  --   --  31   MONOCYTE  --   --  7   EOSINOPHIL  --   --  2   BASOPHIL  --   --  0   ANEU  --   --  4.8   ALYM  --   --  2.5   JANELLE  --   --  0.5   AEOS  --   --  0.1   ABAS  --   --  0.0     CMP  Recent Labs   Lab Test 05/15/25  1153 08/24/24  1452 04/28/23  1123 02/28/23  1508 06/30/20  1056 02/04/19  1106    139 139 139  --  141   POTASSIUM 4.3 3.4 3.4 3.6  --  4.1   CHLORIDE 104 106 111* 108  --  107   CO2 23 20* 22 23  --  28   ANIONGAP 12 13 6 8  --  6   GLC 88 90 110* 98 " 108* 78   BUN 9.7 8.2 10 12  --  5*   CR 0.66 0.83 0.72 0.82  --  0.72   GFRESTIMATED >90 90 >90 >90  --  >90   GFRESTBLACK  --   --   --   --   --  >90   SHARI 9.0 9.2 8.7 9.3  --  8.5   BILITOTAL <0.2  --  0.2 0.2  --  0.4   ALBUMIN 3.8  --  3.5 3.9  --  4.2   PROTTOTAL 6.8  --  7.8 8.3  --  7.7   ALKPHOS 61  --  97 105  --  67   AST 18  --  15 10  --  10   ALT 15  --  32 25  --  20   Uric Acid  Recent Labs   Lab Test 02/28/23  1508   URIC 4.8     Iron Studies  Recent Labs   Lab Test 06/30/20  1056 08/23/19  0849 02/04/19  1106   WESLEY 25 31 30     Calcium/VitaminD  Recent Labs   Lab Test 05/15/25  1153 08/24/24  1452 04/28/23  1123 02/28/23  1508 02/04/19  1106   SHARI 9.0 9.2 8.7   < > 8.5   VITDT  --   --   --   --  26    < > = values in this interval not displayed.     ESR/CRP  Recent Labs   Lab Test 05/15/25  1153 02/28/23  1508 02/04/19  1106   SED 7 19 9   CRP  --  3.2 <2.9   CRPI <3.00  --   --      TSH/T4  Recent Labs   Lab Test 05/15/25  1153 08/24/24  1452 02/26/24  1524 08/23/19  0849 06/10/19  1816 04/29/19  0950   TSH 1.91 1.70 1.50   < > 2.25 1.13   T4 1.05  --   --   --  0.90 0.79    < > = values in this interval not displayed.     Immunization History     Immunization History   Administered Date(s) Administered    COVID-19 Monovalent 18+ (Moderna) 04/22/2021, 05/20/2021    Influenza (IIV3) PF 11/17/2011, 09/26/2012    Influenza Vaccine >6 months,quad, PF 11/15/2013    TD,PF 7+ (Tenivac) 07/21/1998    TDAP (Adacel,Boostrix) 02/26/2024    TDAP Vaccine (Adacel) 05/07/2010, 05/06/2013          Chart documentation done in part with Dragon Voice recognition Software. Although reviewed after completion, some word and grammatical error may remain.    Klaus Adams MD

## 2025-07-31 ENCOUNTER — RESULTS FOLLOW-UP (OUTPATIENT)
Dept: RHEUMATOLOGY | Facility: CLINIC | Age: 45
End: 2025-07-31

## 2025-07-31 DIAGNOSIS — G89.29 CHRONIC BILATERAL LOW BACK PAIN WITHOUT SCIATICA: ICD-10-CM

## 2025-07-31 DIAGNOSIS — M54.50 CHRONIC BILATERAL LOW BACK PAIN WITHOUT SCIATICA: ICD-10-CM

## 2025-07-31 DIAGNOSIS — M25.50 MULTIPLE JOINT PAIN: Primary | ICD-10-CM

## 2025-07-31 LAB
C3 SERPL-MCNC: 140 MG/DL (ref 81–157)
C4 SERPL-MCNC: 28 MG/DL (ref 13–39)
CCP AB SER IA-ACNC: 1 U/ML
DSDNA AB SER-ACNC: 2.2 IU/ML
HBV CORE AB SERPL QL IA: NONREACTIVE
HBV SURFACE AG SERPL QL IA: NONREACTIVE
HCV AB SERPL QL IA: NONREACTIVE
RHEUMATOID FACT SERPL-ACNC: <10 IU/ML

## 2025-07-31 RX ORDER — PREDNISONE 20 MG/1
20 TABLET ORAL DAILY
Qty: 5 TABLET | Refills: 0 | Status: SHIPPED | OUTPATIENT
Start: 2025-07-31 | End: 2025-08-05

## 2025-08-06 ENCOUNTER — TELEPHONE (OUTPATIENT)
Dept: NEUROLOGY | Facility: CLINIC | Age: 45
End: 2025-08-06

## 2025-08-06 ENCOUNTER — THERAPY VISIT (OUTPATIENT)
Dept: PHYSICAL THERAPY | Facility: CLINIC | Age: 45
End: 2025-08-06
Payer: COMMERCIAL

## 2025-08-06 DIAGNOSIS — G89.29 CHRONIC RIGHT SHOULDER PAIN: ICD-10-CM

## 2025-08-06 DIAGNOSIS — M75.42 ROTATOR CUFF IMPINGEMENT SYNDROME OF LEFT SHOULDER: ICD-10-CM

## 2025-08-06 DIAGNOSIS — M25.512 CHRONIC LEFT SHOULDER PAIN: Primary | ICD-10-CM

## 2025-08-06 DIAGNOSIS — G89.29 CHRONIC LEFT SHOULDER PAIN: Primary | ICD-10-CM

## 2025-08-06 DIAGNOSIS — M25.511 CHRONIC RIGHT SHOULDER PAIN: ICD-10-CM

## 2025-08-06 PROCEDURE — 97110 THERAPEUTIC EXERCISES: CPT | Mod: GP | Performed by: PHYSICAL THERAPY ASSISTANT

## 2025-08-13 ENCOUNTER — THERAPY VISIT (OUTPATIENT)
Dept: PHYSICAL THERAPY | Facility: CLINIC | Age: 45
End: 2025-08-13
Payer: COMMERCIAL

## 2025-08-13 DIAGNOSIS — G89.29 CHRONIC LEFT SHOULDER PAIN: Primary | ICD-10-CM

## 2025-08-13 DIAGNOSIS — M25.512 CHRONIC LEFT SHOULDER PAIN: Primary | ICD-10-CM

## 2025-08-13 DIAGNOSIS — M25.511 CHRONIC RIGHT SHOULDER PAIN: ICD-10-CM

## 2025-08-13 DIAGNOSIS — M75.42 ROTATOR CUFF IMPINGEMENT SYNDROME OF LEFT SHOULDER: ICD-10-CM

## 2025-08-13 DIAGNOSIS — G89.29 CHRONIC RIGHT SHOULDER PAIN: ICD-10-CM

## 2025-08-13 PROCEDURE — 97110 THERAPEUTIC EXERCISES: CPT | Mod: GP | Performed by: PHYSICAL THERAPY ASSISTANT

## 2025-08-15 ENCOUNTER — ANCILLARY PROCEDURE (OUTPATIENT)
Dept: MRI IMAGING | Facility: CLINIC | Age: 45
End: 2025-08-15
Attending: INTERNAL MEDICINE
Payer: COMMERCIAL

## 2025-08-15 DIAGNOSIS — M25.50 MULTIPLE JOINT PAIN: ICD-10-CM

## 2025-08-15 DIAGNOSIS — G89.29 CHRONIC BILATERAL LOW BACK PAIN WITHOUT SCIATICA: ICD-10-CM

## 2025-08-15 DIAGNOSIS — M54.50 CHRONIC BILATERAL LOW BACK PAIN WITHOUT SCIATICA: ICD-10-CM

## 2025-08-15 PROCEDURE — 72195 MRI PELVIS W/O DYE: CPT | Performed by: RADIOLOGY

## 2025-08-18 DIAGNOSIS — G43.839 INTRACTABLE MENSTRUAL MIGRAINE WITHOUT STATUS MIGRAINOSUS: ICD-10-CM

## 2025-08-19 RX ORDER — PROPRANOLOL HYDROCHLORIDE 80 MG/1
80 CAPSULE, EXTENDED RELEASE ORAL DAILY
Qty: 90 CAPSULE | Refills: 1 | Status: SHIPPED | OUTPATIENT
Start: 2025-08-19

## 2025-08-28 SDOH — HEALTH STABILITY: PHYSICAL HEALTH: ON AVERAGE, HOW MANY MINUTES DO YOU ENGAGE IN EXERCISE AT THIS LEVEL?: 30 MIN

## 2025-08-28 SDOH — HEALTH STABILITY: PHYSICAL HEALTH: ON AVERAGE, HOW MANY DAYS PER WEEK DO YOU ENGAGE IN MODERATE TO STRENUOUS EXERCISE (LIKE A BRISK WALK)?: 4 DAYS

## 2025-08-28 ASSESSMENT — ASTHMA QUESTIONNAIRES
QUESTION_5 LAST FOUR WEEKS HOW WOULD YOU RATE YOUR ASTHMA CONTROL: WELL CONTROLLED
QUESTION_4 LAST FOUR WEEKS HOW OFTEN HAVE YOU USED YOUR RESCUE INHALER OR NEBULIZER MEDICATION (SUCH AS ALBUTEROL): ONCE A WEEK OR LESS
QUESTION_3 LAST FOUR WEEKS HOW OFTEN DID YOUR ASTHMA SYMPTOMS (WHEEZING, COUGHING, SHORTNESS OF BREATH, CHEST TIGHTNESS OR PAIN) WAKE YOU UP AT NIGHT OR EARLIER THAN USUAL IN THE MORNING: NOT AT ALL
QUESTION_2 LAST FOUR WEEKS HOW OFTEN HAVE YOU HAD SHORTNESS OF BREATH: ONCE OR TWICE A WEEK
QUESTION_1 LAST FOUR WEEKS HOW MUCH OF THE TIME DID YOUR ASTHMA KEEP YOU FROM GETTING AS MUCH DONE AT WORK, SCHOOL OR AT HOME: NONE OF THE TIME
ACT_TOTALSCORE: 22

## 2025-09-02 ENCOUNTER — TELEPHONE (OUTPATIENT)
Dept: FAMILY MEDICINE | Facility: CLINIC | Age: 45
End: 2025-09-02

## 2025-09-02 ENCOUNTER — OFFICE VISIT (OUTPATIENT)
Dept: FAMILY MEDICINE | Facility: CLINIC | Age: 45
End: 2025-09-02
Payer: COMMERCIAL

## 2025-09-02 VITALS
WEIGHT: 257 LBS | OXYGEN SATURATION: 100 % | RESPIRATION RATE: 16 BRPM | BODY MASS INDEX: 42.82 KG/M2 | TEMPERATURE: 98.1 F | HEART RATE: 60 BPM | HEIGHT: 65 IN | DIASTOLIC BLOOD PRESSURE: 70 MMHG | SYSTOLIC BLOOD PRESSURE: 122 MMHG

## 2025-09-02 DIAGNOSIS — E06.3 HASHIMOTO'S THYROIDITIS: ICD-10-CM

## 2025-09-02 DIAGNOSIS — J45.20 MILD INTERMITTENT ASTHMA WITHOUT COMPLICATION: ICD-10-CM

## 2025-09-02 DIAGNOSIS — N92.1 MENORRHAGIA WITH IRREGULAR CYCLE: ICD-10-CM

## 2025-09-02 DIAGNOSIS — E66.813 CLASS 3 SEVERE OBESITY DUE TO EXCESS CALORIES WITHOUT SERIOUS COMORBIDITY WITH BODY MASS INDEX (BMI) OF 40.0 TO 44.9 IN ADULT (H): ICD-10-CM

## 2025-09-02 DIAGNOSIS — G43.109 MIGRAINE WITH AURA AND WITHOUT STATUS MIGRAINOSUS, NOT INTRACTABLE: ICD-10-CM

## 2025-09-02 DIAGNOSIS — Z00.00 ROUTINE GENERAL MEDICAL EXAMINATION AT A HEALTH CARE FACILITY: Primary | ICD-10-CM

## 2025-09-02 PROBLEM — R76.8 THYROGLOBULIN ANTIBODY POSITIVE: Status: RESOLVED | Noted: 2019-06-10 | Resolved: 2025-09-02

## 2025-09-02 LAB
ANION GAP SERPL CALCULATED.3IONS-SCNC: 9 MMOL/L (ref 7–15)
BUN SERPL-MCNC: 10.3 MG/DL (ref 6–20)
CALCIUM SERPL-MCNC: 9.1 MG/DL (ref 8.8–10.4)
CHLORIDE SERPL-SCNC: 105 MMOL/L (ref 98–107)
CHOLEST SERPL-MCNC: 208 MG/DL
CREAT SERPL-MCNC: 0.76 MG/DL (ref 0.51–0.95)
EGFRCR SERPLBLD CKD-EPI 2021: >90 ML/MIN/1.73M2
FASTING STATUS PATIENT QL REPORTED: NO
FASTING STATUS PATIENT QL REPORTED: NO
GLUCOSE SERPL-MCNC: 91 MG/DL (ref 70–99)
HCO3 SERPL-SCNC: 25 MMOL/L (ref 22–29)
HDLC SERPL-MCNC: 40 MG/DL
LDLC SERPL CALC-MCNC: 142 MG/DL
NONHDLC SERPL-MCNC: 168 MG/DL
POTASSIUM SERPL-SCNC: 4 MMOL/L (ref 3.4–5.3)
SODIUM SERPL-SCNC: 139 MMOL/L (ref 135–145)
TRIGL SERPL-MCNC: 130 MG/DL

## 2025-09-02 PROCEDURE — 80061 LIPID PANEL: CPT | Performed by: FAMILY MEDICINE

## 2025-09-02 PROCEDURE — 90677 PCV20 VACCINE IM: CPT | Performed by: FAMILY MEDICINE

## 2025-09-02 PROCEDURE — 36415 COLL VENOUS BLD VENIPUNCTURE: CPT | Performed by: FAMILY MEDICINE

## 2025-09-02 PROCEDURE — 99214 OFFICE O/P EST MOD 30 MIN: CPT | Mod: 25 | Performed by: FAMILY MEDICINE

## 2025-09-02 PROCEDURE — 80048 BASIC METABOLIC PNL TOTAL CA: CPT | Performed by: FAMILY MEDICINE

## 2025-09-02 PROCEDURE — 90472 IMMUNIZATION ADMIN EACH ADD: CPT | Performed by: FAMILY MEDICINE

## 2025-09-02 PROCEDURE — 90656 IIV3 VACC NO PRSV 0.5 ML IM: CPT | Performed by: FAMILY MEDICINE

## 2025-09-02 PROCEDURE — 90471 IMMUNIZATION ADMIN: CPT | Performed by: FAMILY MEDICINE

## 2025-09-02 PROCEDURE — G2211 COMPLEX E/M VISIT ADD ON: HCPCS | Performed by: FAMILY MEDICINE

## 2025-09-02 PROCEDURE — 99396 PREV VISIT EST AGE 40-64: CPT | Performed by: FAMILY MEDICINE

## 2025-09-02 RX ORDER — ACETAMINOPHEN AND CODEINE PHOSPHATE 120; 12 MG/5ML; MG/5ML
0.35 SOLUTION ORAL
Qty: 84 TABLET | Refills: 3 | Status: SHIPPED | OUTPATIENT
Start: 2025-09-02

## 2025-09-02 RX ORDER — ALBUTEROL SULFATE 90 UG/1
1-2 INHALANT RESPIRATORY (INHALATION) EVERY 4 HOURS PRN
Qty: 18 G | Refills: 3 | Status: SHIPPED | OUTPATIENT
Start: 2025-09-02

## 2025-09-02 ASSESSMENT — PAIN SCALES - GENERAL: PAINLEVEL_OUTOF10: NO PAIN (0)
